# Patient Record
Sex: MALE | Race: BLACK OR AFRICAN AMERICAN | Employment: OTHER | URBAN - METROPOLITAN AREA
[De-identification: names, ages, dates, MRNs, and addresses within clinical notes are randomized per-mention and may not be internally consistent; named-entity substitution may affect disease eponyms.]

---

## 2017-02-28 ENCOUNTER — OFFICE VISIT (OUTPATIENT)
Dept: INTERNAL MEDICINE CLINIC | Age: 67
End: 2017-02-28

## 2017-02-28 VITALS
SYSTOLIC BLOOD PRESSURE: 131 MMHG | HEART RATE: 58 BPM | DIASTOLIC BLOOD PRESSURE: 83 MMHG | RESPIRATION RATE: 18 BRPM | OXYGEN SATURATION: 97 % | HEIGHT: 68 IN | BODY MASS INDEX: 27.74 KG/M2 | WEIGHT: 183 LBS | TEMPERATURE: 98.4 F

## 2017-02-28 DIAGNOSIS — R35.1 NOCTURIA: ICD-10-CM

## 2017-02-28 DIAGNOSIS — I35.1 AORTIC VALVE INSUFFICIENCY, UNSPECIFIED ETIOLOGY: ICD-10-CM

## 2017-02-28 DIAGNOSIS — R79.9 ABNORMAL FINDING OF BLOOD CHEMISTRY: ICD-10-CM

## 2017-02-28 DIAGNOSIS — I10 ESSENTIAL HYPERTENSION: Primary | ICD-10-CM

## 2017-02-28 DIAGNOSIS — M19.90 ARTHRITIS: ICD-10-CM

## 2017-02-28 DIAGNOSIS — I51.89 DIASTOLIC DYSFUNCTION: ICD-10-CM

## 2017-02-28 RX ORDER — ESCITALOPRAM OXALATE 10 MG/1
10 TABLET ORAL DAILY
Qty: 30 TAB | Refills: 11 | Status: SHIPPED | OUTPATIENT
Start: 2017-02-28 | End: 2017-09-26 | Stop reason: SDUPTHER

## 2017-02-28 RX ORDER — LANSOPRAZOLE 30 MG/1
30 CAPSULE, DELAYED RELEASE ORAL
Qty: 30 CAP | Refills: 11 | Status: SHIPPED | OUTPATIENT
Start: 2017-02-28 | End: 2021-11-08 | Stop reason: ALTCHOICE

## 2017-02-28 RX ORDER — CYCLOBENZAPRINE HCL 10 MG
10 TABLET ORAL
Qty: 60 TAB | Refills: 4 | Status: SHIPPED | OUTPATIENT
Start: 2017-02-28 | End: 2017-09-26 | Stop reason: SDUPTHER

## 2017-02-28 NOTE — MR AVS SNAPSHOT
Visit Information Date & Time Provider Department Dept. Phone Encounter #  
 2/28/2017  2:45 PM Mildred Toledo MD [facility] - [facility] [phone] [id number] Follow-up Instructions Return in about 6 months (around 8/28/2017). Follow-up and Disposition History Your Appointments 8/29/2017  2:30 PM  
Any with Mildred Toledo MD  
[facility] ([facility]) Appt Note: f/u  
 [address], [place] [address] [address]  
  
   
 [address], [facility] [address] Upcoming Health Maintenance Date Due FOBT Q 1 YEAR AGE 50-75 7/23/2016 MEDICARE YEARLY EXAM 8/26/2017 Pneumococcal 65+ Low/Medium Risk (2 of 2 - PPSV23) 2/28/2018 GLAUCOMA SCREENING Q2Y 2/28/2019 DTaP/Tdap/Td series (2 - Td) 1/27/2026 Allergies as of 2/28/2017  Review Complete On: 2/28/2017 By: Mildred Toledo MD  
  
 Severity Noted Reaction Type Reactions Sulfa (Sulfonamide Antibiotics)  01/22/2015    Hives Current Immunizations  Never Reviewed No immunizations on file. Not reviewed this visit You Were Diagnosed With   
  
 Codes Comments Essential hypertension    -  Primary ICD-10-CM: I10 
ICD-9-CM: 401.9 Arthritis     ICD-10-CM: M19.90 ICD-9-CM: 716.90 Diastolic dysfunction     XLA-54-GV: I51.9 ICD-9-CM: 429.9 Aortic valve insufficiency, unspecified etiology     ICD-10-CM: I35.1 ICD-9-CM: 424.1 Nocturia     ICD-10-CM: R35.1 ICD-9-CM: 788.43 Abnormal finding of blood chemistry     ICD-10-CM: R79.9 ICD-9-CM: 790.6 Vitals BP  
  
  
  
  
  
 131/83 (BP 1 Location: Right arm, BP Patient Position: Sitting) BMI and BSA Data Body Mass Index Body Surface Area  
 27.83 kg/m 2 2 m 2 Preferred Pharmacy Pharmacy Name Phone  [facility]/PHARMACY #[id number], [place] - [address] [facility] [phone] Your Updated Medication List  
  
   
This list is accurate as of: 2/28/17  4:07 PM.  Always use your most recent med list.  
  
  
  
  
 COMBIGAN 0.2-0.5 % Drop ophthalmic solution Generic drug:  brimonidine-timolol 1 drop every twelve (12) hours. cyclobenzaprine 10 mg tablet Commonly known as:  FLEXERIL Take 1 Tab by mouth three (3) times daily as needed for Muscle Spasm(s). escitalopram oxalate 10 mg tablet Commonly known as:  Joao Hands Take 1 Tab by mouth daily. lansoprazole 30 mg capsule Commonly known as:  PREVACID Take 1 Cap by mouth Daily (before breakfast). latanoprost 0.005 % ophthalmic solution Commonly known as:  Dayton Ridgel Administer 1 drop to both eyes nightly. Olmesartan-amLODIPine-HCTZ 40-5-25 mg Tab Commonly known as:  [employer] Take 1 Each by mouth daily. Prescriptions Sent to Pharmacy Refills  
 cyclobenzaprine (FLEXERIL) 10 mg tablet 4 Sig: Take 1 Tab by mouth three (3) times daily as needed for Muscle Spasm(s). Class: Normal  
 Pharmacy: [facility]/pharmacy [address] [place] [address] Ph #: [phone] Route: Oral  
 escitalopram oxalate (LEXAPRO) 10 mg tablet 11 Sig: Take 1 Tab by mouth daily. Class: Normal  
 Pharmacy: [facility]/pharmacy #[id number] Ph #: [phone] Route: Oral  
 lansoprazole (PREVACID) 30 mg capsule 11 Sig: Take 1 Cap by mouth Daily (before breakfast). Class: Normal  
 Pharmacy: [facility]/pharmacy #[id number] Ph #: [phone] Route: Oral  
  
We Performed the Following APOLIPOPROTEIN B [id number] CPT(R)] CBC WITH AUTOMATED DIFF [85560 CPT(R)] HEMOGLOBIN A1C WITH EAG [84629 CPT(R)] LIPID PANEL [90802 CPT(R)] METABOLIC PANEL, COMPREHENSIVE [24009 CPT(R)] CO COLLECTION VENOUS BLOOD,VENIPUNCTURE [id number] CPT(R)] PROSTATE SPECIFIC AG (PSA) [id number] CPT(R)] URINALYSIS W/ RFLX MICROSCOPIC [78622 CPT(R)] Follow-up Instructions Return in about 6 months (around 8/28/2017). Introducing [facility]! Denise Franco introduces [employer] patient portal. Now you can access parts of your medical record, email your doctor's office, and request medication refills online. 1. In your internet browser, go to [email or web address]. [email or web address]/"[employer] 2. Click on the First Time User? Click Here link in the Sign In box. You will see the New Member Sign Up page. 3. Enter your [employer] Access Code exactly as it appears below. You will not need to use this code after youve completed the sign-up process. If you do not sign up before the expiration date, you must request a new code. · [employer] Access Code: [email or web address] Expires: 5/29/2017  4:07 PM 
 
4. Enter the last four digits of your Social Security Number (xxxx) and Date of Birth (mm/dd/yyyy) as indicated and click Submit. You will be taken to the next sign-up page. 5. Create a [employer] ID. This will be your [employer] login ID and cannot be changed, so think of one that is secure and easy to remember. 6. Create a [employer] password. You can change your password at any time. 7. Enter your Password Reset Question and Answer. This can be used at a later time if you forget your password. 8. Enter your e-mail address. You will receive e-mail notification when new information is available in [address] 9. Click Sign Up. You can now view and download portions of your medical record. 10. Click the Download Summary menu link to download a portable copy of your medical information. If you have questions, please visit the Frequently Asked Questions section of the [employer] website. Remember, [employer] is NOT to be used for urgent needs. For medical emergencies, dial 911. Now available from your iPhone and Android! Please provide this summary of care documentation to your next provider. Your primary care clinician is listed as [facility]. If you have any questions after today's visit, please call [phone].

## 2017-02-28 NOTE — PROGRESS NOTES
.1. Have you been to the ER, urgent care clinic since your last visit? Hospitalized since your last visit? No    2. Have you seen or consulted any other health care providers outside of the [address] [place] [address] since your last visit? Include any pap smears or colon screening.  No

## 2017-02-28 NOTE — PROGRESS NOTES
[facility]  Osman Babcock MD, [address] [address]  Phone:  [phone]  Fax: [phone]      Chief Complaint   Patient presents with    Hypertension         SUBECTIVE:    Charlotte Mccoy is a 73 y.o. male Patient voices no new complaints. He has an appointment to see Dr. Cheryle Sorenson for follow-up of his aortic stenosis on Thursday. He continues to exercise regularly. He is now walking for about two miles, thirty minutes every day. Patient is seen for evaluation. Current Outpatient Prescriptions   Medication Sig Dispense Refill    cyclobenzaprine (FLEXERIL) 10 mg tablet Take 1 Tab by mouth three (3) times daily as needed for Muscle Spasm(s). 60 Tab 4    escitalopram oxalate (LEXAPRO) 10 mg tablet Take 1 Tab by mouth daily. 30 Tab 11    lansoprazole (PREVACID) 30 mg capsule Take 1 Cap by mouth Daily (before breakfast). 30 Cap 11    Olmesartan-amLODIPine-HCTZ (TRIBENZOR) 40-5-25 mg tab Take 1 Each by mouth daily. 90 Tab 3    brimonidine-timolol (COMBIGAN) 0.2-0.5 % drop ophthalmic solution 1 drop every twelve (12) hours.  latanoprost (XALATAN) 0.005 % ophthalmic solution Administer 1 drop to both eyes nightly. Past Medical History:   Diagnosis Date    Aortic insufficiency 08/30/2015    dangelo dangelo md    Arthritis     DDD (degenerative disc disease)     Diastolic dysfunction     Glaucoma     Hypertension     LBP (low back pain)     Mild aortic insufficiency 03/18/2016    S/P colonoscopy 4-13-15    hemorrhoids     Past Surgical History:   Procedure Laterality Date    ABDOMEN SURGERY PROC UNLISTED      bilateral hernia repair    HX COLONOSCOPY       Allergies   Allergen Reactions    Sulfa (Sulfonamide Antibiotics) Hives       REVIEW OF SYSTEMS:   There is no chest pain and no shortness of breath.          Social History     Social History    Marital status:      Spouse name: N/A    Number of children: N/A    Years of education: N/A     Social History Main Topics    Smoking status: Former Smoker    Smokeless tobacco: Never Used    Alcohol use No    Drug use: None    Sexual activity: Not Asked     Other Topics Concern    None     Social History Narrative    Medical History: Primary hypertensionDepressive disordernormal head CT May 15, 2007Mild aortic regurgitationDiastolic dysfunctionnormal left    ventricular systolic function ejection fraction 50% echocardiogram 2011negative stress w all motion study 2001 ejection    fraction 48%GlaucomaDecember 2013 CT abdomen and pelvis degenerative changes lumbar spine no acute process    Surgical History: hernia repair 2012colonoscopy 2005[facility] 2014    Hospitalization/Major Diagnostic Procedure: Denies Past Hospitalization    Family History: Mother: alive, hypertension,Father:  79 yrs, chfSister(s): aliveBrother(s): aliveDaughter(s): aliveSon(s):    aliveChildren: aliveAdopted: deceased2 brother(s) , 2 sister(s) . 1 son(s) , 1 daughter(s) . Social History: Alcohol Use Patient does not use alcohol. Smoking Status Patient is a never smoker. Exercise: running. Marital Status:    . Lives w ith: spouse - recently had bunion surgery - eric. 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 Education/School: high school.   r  Family History   Problem Relation Age of Onset    Heart Disease Father        OBJECTIVE:  Visit Vitals    /83 (BP 1 Location: Right arm, BP Patient Position: Sitting)    Pulse (!) 58    Temp 98.4 °F (36.9 °C) (Oral)    Resp 18    Ht 5' 8\" (1.727 m)    Wt 183 lb (83 kg)    SpO2 97%    BMI 27.83 kg/m2     ENT: perrla,  eom intact  NECK: supple.  Thyroid normal  CHEST: clear to ascultation and percussion   HEART: regular rate and rhythm  ABD: soft, bowel sounds active  EXTREMITIES: no edema, pulse 1+     No visits with results within 3 Month(s) from this visit. Latest known visit with results is:    Office Visit on 04/14/2016   Component Date Value Ref Range Status    Specific Gravity 04/14/2016 1.014  1.005 - 1.030 Final    pH (UA) 04/14/2016 6.0  5.0 - 7.5 Final    Color 04/14/2016 Yellow  Yellow Final    Appearance 04/14/2016 Clear  Clear Final    Leukocyte Esterase 04/14/2016 Negative  Negative Final    Protein 04/14/2016 Negative  Negative/Trace Final    Glucose 04/14/2016 Negative  Negative Final    Ketone 04/14/2016 Negative  Negative Final    Blood 04/14/2016 Negative  Negative Final    Bilirubin 04/14/2016 Negative  Negative Final    Urobilinogen 04/14/2016 0.2  0.2 - 1.0 mg/dL Final    Nitrites 04/14/2016 Negative  Negative Final    Microscopic Examination 04/14/2016 Comment   Final    Microscopic not indicated and not performed.  WBC 04/14/2016 6.2  3.4 - 10.8 x10E3/uL Final    RBC 04/14/2016 4.84  4.14 - 5.80 x10E6/uL Final    HGB 04/14/2016 14.5  12.6 - 17.7 g/dL Final    HCT 04/14/2016 40.9  37.5 - 51.0 % Final    MCV 04/14/2016 85  79 - 97 fL Final    MCH 04/14/2016 30.0  26.6 - 33.0 pg Final    MCHC 04/14/2016 35.5  31.5 - 35.7 g/dL Final    RDW 04/14/2016 14.8  12.3 - 15.4 % Final    PLATELET 03/48/8295 952  150 - 379 x10E3/uL Final    NEUTROPHILS 04/14/2016 56  % Final    Lymphocytes 04/14/2016 32  % Final    MONOCYTES 04/14/2016 8  % Final    EOSINOPHILS 04/14/2016 3  % Final    BASOPHILS 04/14/2016 1  % Final    ABS. NEUTROPHILS 04/14/2016 3.5  1.4 - 7.0 x10E3/uL Final    Abs Lymphocytes 04/14/2016 2.0  0.7 - 3.1 x10E3/uL Final    ABS. MONOCYTES 04/14/2016 0.5  0.1 - 0.9 x10E3/uL Final    ABS. EOSINOPHILS 04/14/2016 0.2  0.0 - 0.4 x10E3/uL Final    ABS. BASOPHILS 04/14/2016 0.0  0.0 - 0.2 x10E3/uL Final    IMMATURE GRANULOCYTES 04/14/2016 0  % Final    ABS. IMM.  GRANS. 04/14/2016 0.0  0.0 - 0.1 x10E3/uL Final    Glucose 04/14/2016 96  65 - 99 mg/dL Final    BUN 04/14/2016 20  8 - 27 mg/dL Final    Creatinine 04/14/2016 1.24  0.76 - 1.27 mg/dL Final    GFR est non-AA 04/14/2016 61  >59 mL/min/1.73 Final    GFR est AA 04/14/2016 71  >59 mL/min/1.73 Final    BUN/Creatinine ratio 04/14/2016 16  10 - 22 Final    Sodium 04/14/2016 142  134 - 144 mmol/L Final    Potassium 04/14/2016 3.9  3.5 - 5.2 mmol/L Final    Chloride 04/14/2016 99  97 - 108 mmol/L Final    CO2 04/14/2016 26  18 - 29 mmol/L Final    Calcium 04/14/2016 9.7  8.6 - 10.2 mg/dL Final    Protein, total 04/14/2016 7.0  6.0 - 8.5 g/dL Final    Albumin 04/14/2016 4.2  3.6 - 4.8 g/dL Final    GLOBULIN, TOTAL 04/14/2016 2.8  1.5 - 4.5 g/dL Final    A-G Ratio 04/14/2016 1.5  1.1 - 2.5 Final    Bilirubin, total 04/14/2016 0.4  0.0 - 1.2 mg/dL Final    Alk. phosphatase 04/14/2016 65  39 - 117 IU/L Final    AST (SGOT) 04/14/2016 19  0 - 40 IU/L Final    ALT (SGPT) 04/14/2016 10  0 - 44 IU/L Final    Prostate Specific Ag 04/14/2016 0.9  0.0 - 4.0 ng/mL Final    Comment: Roche ECLIA methodology. According to the American Urological Association, Serum PSA should  decrease and remain at undetectable levels after radical  prostatectomy. The AUA defines biochemical recurrence as an initial  PSA value 0.2 ng/mL or greater followed by a subsequent confirmatory  PSA value 0.2 ng/mL or greater. Values obtained with different assay methods or kits cannot be used  interchangeably. Results cannot be interpreted as absolute evidence  of the presence or absence of malignant disease.  Cholesterol, total 04/14/2016 235* 100 - 199 mg/dL Final    Triglyceride 04/14/2016 123  0 - 149 mg/dL Final    HDL Cholesterol 04/14/2016 52  >39 mg/dL Final    Comment: According to ATP-III Guidelines, HDL-C >59 mg/dL is considered a  negative risk factor for CHD.       VLDL, calculated 04/14/2016 25  5 - 40 mg/dL Final    LDL, calculated 04/14/2016 158* 0 - 99 mg/dL Final    Hemoglobin A1c 04/14/2016 5.6  4.8 - 5.6 % Final    Comment: Pre-diabetes: 5.7 - 6.4           Diabetes: >6.4           Glycemic control for adults with diabetes: <7.0      Estimated average glucose 04/14/2016 114  mg/dL Final          ASSESSMENT:  1. Essential hypertension    2. Arthritis    3. Diastolic dysfunction    4. Aortic valve insufficiency, unspecified etiology    5. Nocturia     6. Abnormal finding of blood chemistry       Patient has stiffness of his back when he gets up in the morning. He would like some more Flexeril which we will give him a prescription for. He is also complaining of gastroesophageal reflux symptoms for which we will place him on a PPI. He also would like some more Effexor. He thinks it is more seasonal related and I do not disagree. Appropriate laboratory studies will be requested. He will see us again in about six months. PLAN:  .  Orders Placed This Encounter    URINALYSIS W/ RFLX MICROSCOPIC    CBC WITH AUTOMATED DIFF    METABOLIC PANEL, COMPREHENSIVE    LIPID PANEL    PROSTATE SPECIFIC AG    HEMOGLOBIN A1C WITH EAG    APOLIPOPROTEIN B    cyclobenzaprine (FLEXERIL) 10 mg tablet    escitalopram oxalate (LEXAPRO) 10 mg tablet    lansoprazole (PREVACID) 30 mg capsule       Follow-up Disposition:  Return in about 6 months (around 8/28/2017). ATTENTION:   This medical record was transcribed using an electronic medical records system. Although proofread, it may and can contain electronic and spelling errors. Other human spelling and other errors may be present. Corrections may be executed at a later time. Please feel free to contact us for any clarifications as needed.

## 2017-05-10 RX ORDER — OLMESARTAN MEDOXOMIL, AMLODIPINE AND HYDROCHLOROTHIAZIDE TABLET 40/5/25 MG 40; 5; 25 MG/1; MG/1; MG/1
1 TABLET ORAL DAILY
Qty: 90 TAB | Refills: 3 | Status: SHIPPED | OUTPATIENT
Start: 2017-05-10 | End: 2018-04-25 | Stop reason: SDUPTHER

## 2017-09-26 ENCOUNTER — OFFICE VISIT (OUTPATIENT)
Dept: INTERNAL MEDICINE CLINIC | Age: 67
End: 2017-09-26

## 2017-09-26 VITALS
BODY MASS INDEX: 27.13 KG/M2 | SYSTOLIC BLOOD PRESSURE: 142 MMHG | RESPIRATION RATE: 22 BRPM | TEMPERATURE: 95.3 F | HEIGHT: 68 IN | DIASTOLIC BLOOD PRESSURE: 92 MMHG | OXYGEN SATURATION: 100 % | HEART RATE: 51 BPM | WEIGHT: 179 LBS

## 2017-09-26 DIAGNOSIS — Z13.31 SCREENING FOR DEPRESSION: ICD-10-CM

## 2017-09-26 DIAGNOSIS — Z13.39 SCREENING FOR ALCOHOLISM: ICD-10-CM

## 2017-09-26 DIAGNOSIS — R31.9 HEMATURIA: ICD-10-CM

## 2017-09-26 DIAGNOSIS — M19.90 ARTHRITIS: ICD-10-CM

## 2017-09-26 DIAGNOSIS — I51.89 DIASTOLIC DYSFUNCTION: ICD-10-CM

## 2017-09-26 DIAGNOSIS — R79.9 ABNORMAL FINDING OF BLOOD CHEMISTRY: ICD-10-CM

## 2017-09-26 DIAGNOSIS — I35.1 AORTIC VALVE REGURGITATION, UNSPECIFIED ETIOLOGY: ICD-10-CM

## 2017-09-26 DIAGNOSIS — I10 ESSENTIAL HYPERTENSION: ICD-10-CM

## 2017-09-26 DIAGNOSIS — Z00.00 MEDICARE ANNUAL WELLNESS VISIT, SUBSEQUENT: Primary | ICD-10-CM

## 2017-09-26 RX ORDER — CYCLOBENZAPRINE HCL 10 MG
10 TABLET ORAL
Qty: 180 TAB | Refills: 4 | Status: SHIPPED | OUTPATIENT
Start: 2017-09-26 | End: 2019-06-18

## 2017-09-26 RX ORDER — ESCITALOPRAM OXALATE 10 MG/1
10 TABLET ORAL DAILY
Qty: 90 TAB | Refills: 11 | Status: SHIPPED | OUTPATIENT
Start: 2017-09-26 | End: 2018-12-12 | Stop reason: SDUPTHER

## 2017-09-26 NOTE — PROGRESS NOTES
This is a Subsequent Medicare Annual Wellness Exam (AWV) (Performed 12 months after IPPE or effective date of Medicare Part B enrollment, Once in a lifetime)    I have reviewed the patient's medical history in detail and updated the computerized patient record. History     Past Medical History:   Diagnosis Date    Aortic insufficiency 08/30/2015    dangelo - leno dangelo md    Arthritis     DDD (degenerative disc disease)     Diastolic dysfunction     Glaucoma     Hypertension     LBP (low back pain)     Mild aortic insufficiency 03/18/2016    S/P colonoscopy 4-13-15    hemorrhoids      Past Surgical History:   Procedure Laterality Date    ABDOMEN SURGERY PROC UNLISTED      bilateral hernia repair    HX COLONOSCOPY       Current Outpatient Prescriptions   Medication Sig Dispense Refill    Olmesartan-amLODIPine-HCTZ (TRIBENZOR) 40-5-25 mg tab Take 1 Each by mouth daily. 90 Tab 3    escitalopram oxalate (LEXAPRO) 10 mg tablet Take 1 Tab by mouth daily. 30 Tab 11    lansoprazole (PREVACID) 30 mg capsule Take 1 Cap by mouth Daily (before breakfast). 30 Cap 11    brimonidine-timolol (COMBIGAN) 0.2-0.5 % drop ophthalmic solution 1 drop every twelve (12) hours.  latanoprost (XALATAN) 0.005 % ophthalmic solution Administer 1 drop to both eyes nightly.  cyclobenzaprine (FLEXERIL) 10 mg tablet Take 1 Tab by mouth three (3) times daily as needed for Muscle Spasm(s).  60 Tab 4     Allergies   Allergen Reactions    Sulfa (Sulfonamide Antibiotics) Hives     Family History   Problem Relation Age of Onset    Heart Disease Father      Social History   Substance Use Topics    Smoking status: Former Smoker    Smokeless tobacco: Never Used    Alcohol use No     Patient Active Problem List   Diagnosis Code    Hypertension I10    Arthritis M19.90    LBP (low back pain) O79.7    Diastolic dysfunction S63.0    Aortic insufficiency I35.1    Mild aortic insufficiency I35.1       Depression Risk Factor Screening:     PHQ over the last two weeks 9/26/2017   PHQ Not Done Active Diagnosis of Depression or Bipolar Disorder   Little interest or pleasure in doing things -   Feeling down, depressed or hopeless -   Total Score PHQ 2 -     Alcohol Risk Factor Screening: You do not drink alcohol or very rarely. Functional Ability and Level of Safety:   Hearing Loss  Hearing is good. Activities of Daily Living  The home contains: no safety equipment  Patient does total self care    Fall Risk  Fall Risk Assessment, last 12 mths 9/26/2017   Able to walk? Yes   Fall in past 12 months? No       Abuse Screen  Patient is not abused    Cognitive Screening   Evaluation of Cognitive Function:  Has your family/caregiver stated any concerns about your memory: no  Normal    Patient Care Team   Patient Care Team:  Marixa Landry MD as PCP - General (Internal Medicine)    Assessment/Plan   Education and counseling provided:  Are appropriate based on today's review and evaluation    Diagnoses and all orders for this visit:    1. Essential hypertension  -     URINALYSIS W/ RFLX MICROSCOPIC  -     CBC WITH AUTOMATED DIFF  -     METABOLIC PANEL, COMPREHENSIVE  -     LIPID PANEL  -     PROSTATE SPECIFIC AG  -     ME COLLECTION VENOUS BLOOD,VENIPUNCTURE  -     HEMOGLOBIN A1C WITH EAG    2. Arthritis    3. Diastolic dysfunction    4. Aortic valve regurgitation, unspecified etiology    5. Hematuria   -     PROSTATE SPECIFIC AG    6. Abnormal finding of blood chemistry   -     HEMOGLOBIN A1C WITH EAG        There are no preventive care reminders to display for this patient. Advance Care Planning (ACP) Provider Conversation Snapshot    Date of ACP Conversation: 09/26/17  Persons included in Conversation:  patient  Length of ACP Conversation in minutes:  <16 minutes (Non-Billable)    Authorized Decision Maker (if patient is incapable of making informed decisions):    This person is:   Healthcare Agent/Medical Power of  under Advance Directive  wife        For Patients with Decision Making Capacity:   Values/Goals: Exploration of values, goals, and preferences if recovery is not expected, even with continued medical treatment in the event of:  Imminent death    Conversation Outcomes / Follow-Up Plan:   Recommended completion of Advance Directive form after review of ACP materials and conversation with prospective healthcare agent      [address], [place], [facility] [address] [place] [address],[address] [address]  Phone:  [phone]  Fax: [phone]      Chief Complaint   Patient presents with    Annual Wellness Visit         SUBECTIVE:    Pradeep Sanchez is a 78 y.o. male The patient returns today, alert, appropriate and has the capacity to give an accurate history. He has a known history of aortic insufficiency followed by Jeronimo Cm MD, degenerative disc disease, diastolic dysfunction, primary hypertension, low back pain and glaucoma. He voices no new complaints and is seen for evaluation. Current Outpatient Prescriptions   Medication Sig Dispense Refill    Olmesartan-amLODIPine-HCTZ (TRIBENZOR) 40-5-25 mg tab Take 1 Each by mouth daily. 90 Tab 3    escitalopram oxalate (LEXAPRO) 10 mg tablet Take 1 Tab by mouth daily. 30 Tab 11    lansoprazole (PREVACID) 30 mg capsule Take 1 Cap by mouth Daily (before breakfast). 30 Cap 11    brimonidine-timolol (COMBIGAN) 0.2-0.5 % drop ophthalmic solution 1 drop every twelve (12) hours.  latanoprost (XALATAN) 0.005 % ophthalmic solution Administer 1 drop to both eyes nightly.  cyclobenzaprine (FLEXERIL) 10 mg tablet Take 1 Tab by mouth three (3) times daily as needed for Muscle Spasm(s).  61 Tab 4     Past Medical History:   Diagnosis Date    Aortic insufficiency 08/30/2015    dangelo - leno dangelo md    Arthritis     DDD (degenerative disc disease)     Diastolic dysfunction     Glaucoma     Hypertension     LBP (low back pain)     Mild aortic insufficiency 2016    S/P colonoscopy 4-13-15    hemorrhoids     Past Surgical History:   Procedure Laterality Date    ABDOMEN SURGERY PROC UNLISTED      bilateral hernia repair    HX COLONOSCOPY       Allergies   Allergen Reactions    Sulfa (Sulfonamide Antibiotics) Hives       REVIEW OF SYSTEMS:   No chest pain, no shortness of breath. Social History     Social History    Marital status:      Spouse name: N/A    Number of children: N/A    Years of education: N/A     Social History Main Topics    Smoking status: Former Smoker    Smokeless tobacco: Never Used    Alcohol use No    Drug use: None    Sexual activity: Not Asked     Other Topics Concern    None     Social History Narrative    Medical History: Primary hypertensionDepressive disordernormal head CT May 15, 2007Mild aortic regurgitationDiastolic dysfunctionnormal left    ventricular systolic function ejection fraction 50% echocardiogram 2011negative stress w all motion study 2001 ejection    fraction 48%GlaucomaDecemb 2013 CT abdomen and pelvis degenerative changes lumbar spine no acute process    Surgical History: hernia repair 2012colonoscopy 2005[facility]    Hospitalization/Major Diagnostic Procedure: Denies Past Hospitalization    Family History: Mother: alive, hypertension,Father:  79 yrs, chfSister(s): aliveBrother(s): aliveDaughter(s): aliveSon(s):    aliveChildren: aliveAdopted: deceased2 brother(s) , 2 sister(s) . 1 son(s) , 1 daughter(s) . Social History: Alcohol Use Patient does not use alcohol. Smoking Status Patient is a never smoker. Exercise: running. Marital Status:    . Lives w ith: spouse - recently had bunion surgery - eric. 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  Education/School: high school.   r  Family History   Problem Relation Age of Onset    Heart Disease Father        OBJECTIVE:  Visit Vitals    BP (!) 142/92 (BP 1 Location: Right arm, BP Patient Position: Sitting)    Pulse (!) 51    Temp 95.3 °F (35.2 °C) (Oral)    Resp 22    Ht 5' 8\" (1.727 m)    Wt 179 lb (81.2 kg)    SpO2 100%    BMI 27.22 kg/m2     ENT: perrla,  eom intact  NECK: supple. Thyroid normal  CHEST: clear to ascultation and percussion   HEART: regular rate and rhythm  ABD: soft, bowel sounds active  EXTREMITIES: no edema, pulse 1+     No visits with results within 3 Month(s) from this visit. Latest known visit with results is:    Office Visit on 04/14/2016   Component Date Value Ref Range Status    Specific Gravity 04/14/2016 1.014  1.005 - 1.030 Final    pH (UA) 04/14/2016 6.0  5.0 - 7.5 Final    Color 04/14/2016 Yellow  Yellow Final    Appearance 04/14/2016 Clear  Clear Final    Leukocyte Esterase 04/14/2016 Negative  Negative Final    Protein 04/14/2016 Negative  Negative/Trace Final    Glucose 04/14/2016 Negative  Negative Final    Ketone 04/14/2016 Negative  Negative Final    Blood 04/14/2016 Negative  Negative Final    Bilirubin 04/14/2016 Negative  Negative Final    Urobilinogen 04/14/2016 0.2  0.2 - 1.0 mg/dL Final    Nitrites 04/14/2016 Negative  Negative Final    Microscopic Examination 04/14/2016 Comment   Final    Microscopic not indicated and not performed.     WBC 04/14/2016 6.2  3.4 - 10.8 x10E3/uL Final    RBC 04/14/2016 4.84  4.14 - 5.80 x10E6/uL Final    HGB 04/14/2016 14.5  12.6 - 17.7 g/dL Final    HCT 04/14/2016 40.9  37.5 - 51.0 % Final    MCV 04/14/2016 85  79 - 97 fL Final    MCH 04/14/2016 30.0  26.6 - 33.0 pg Final    MCHC 04/14/2016 35.5  31.5 - 35.7 g/dL Final    RDW 04/14/2016 14.8  12.3 - 15.4 % Final    PLATELET 80/41/9458 317  150 - 379 x10E3/uL Final    NEUTROPHILS 04/14/2016 56  % Final    Lymphocytes 04/14/2016 32  % Final    MONOCYTES 04/14/2016 8  % Final    EOSINOPHILS 04/14/2016 3  % Final    BASOPHILS 04/14/2016 1  % Final    ABS. NEUTROPHILS 04/14/2016 3.5  1.4 - 7.0 x10E3/uL Final    Abs Lymphocytes 04/14/2016 2.0  0.7 - 3.1 x10E3/uL Final    ABS. MONOCYTES 04/14/2016 0.5  0.1 - 0.9 x10E3/uL Final    ABS. EOSINOPHILS 04/14/2016 0.2  0.0 - 0.4 x10E3/uL Final    ABS. BASOPHILS 04/14/2016 0.0  0.0 - 0.2 x10E3/uL Final    IMMATURE GRANULOCYTES 04/14/2016 0  % Final    ABS. IMM. GRANS. 04/14/2016 0.0  0.0 - 0.1 x10E3/uL Final    Glucose 04/14/2016 96  65 - 99 mg/dL Final    BUN 04/14/2016 20  8 - 27 mg/dL Final    Creatinine 04/14/2016 1.24  0.76 - 1.27 mg/dL Final    GFR est non-AA 04/14/2016 61  >59 mL/min/1.73 Final    GFR est AA 04/14/2016 71  >59 mL/min/1.73 Final    BUN/Creatinine ratio 04/14/2016 16  10 - 22 Final    Sodium 04/14/2016 142  134 - 144 mmol/L Final    Potassium 04/14/2016 3.9  3.5 - 5.2 mmol/L Final    Chloride 04/14/2016 99  97 - 108 mmol/L Final    CO2 04/14/2016 26  18 - 29 mmol/L Final    Calcium 04/14/2016 9.7  8.6 - 10.2 mg/dL Final    Protein, total 04/14/2016 7.0  6.0 - 8.5 g/dL Final    Albumin 04/14/2016 4.2  3.6 - 4.8 g/dL Final    GLOBULIN, TOTAL 04/14/2016 2.8  1.5 - 4.5 g/dL Final    A-G Ratio 04/14/2016 1.5  1.1 - 2.5 Final    Bilirubin, total 04/14/2016 0.4  0.0 - 1.2 mg/dL Final    Alk. phosphatase 04/14/2016 65  39 - 117 IU/L Final    AST (SGOT) 04/14/2016 19  0 - 40 IU/L Final    ALT (SGPT) 04/14/2016 10  0 - 44 IU/L Final    Prostate Specific Ag 04/14/2016 0.9  0.0 - 4.0 ng/mL Final    Comment: Roche ECLIA methodology. According to the American Urological Association, Serum PSA should  decrease and remain at undetectable levels after radical  prostatectomy. The AUA defines biochemical recurrence as an initial  PSA value 0.2 ng/mL or greater followed by a subsequent confirmatory  PSA value 0.2 ng/mL or greater. Values obtained with different assay methods or kits cannot be used  interchangeably.  Results cannot be interpreted as absolute evidence  of the presence or absence of malignant disease.  Cholesterol, total 04/14/2016 235* 100 - 199 mg/dL Final    Triglyceride 04/14/2016 123  0 - 149 mg/dL Final    HDL Cholesterol 04/14/2016 52  >39 mg/dL Final    Comment: According to ATP-III Guidelines, HDL-C >59 mg/dL is considered a  negative risk factor for CHD.  VLDL, calculated 04/14/2016 25  5 - 40 mg/dL Final    LDL, calculated 04/14/2016 158* 0 - 99 mg/dL Final    Hemoglobin A1c 04/14/2016 5.6  4.8 - 5.6 % Final    Comment:          Pre-diabetes: 5.7 - 6.4           Diabetes: >6.4           Glycemic control for adults with diabetes: <7.0      Estimated average glucose 04/14/2016 114  mg/dL Final          ASSESSMENT:  1. Essential hypertension    2. Arthritis    3. Diastolic dysfunction    4. Aortic valve regurgitation, unspecified etiology    5. Hematuria     6. Abnormal finding of blood chemistry       The patient's medical progress is satisfactory. He complains of chronic low back pain which has stopped him from running. He went on the Internet and saw that walking is good for the back. He has tried different exercises for the back with little relief. He has tried different NSAID's at home. His blood pressure is approaching ideal.  The patient has white coat hypertension which is manifested by blood pressure in the 90/50's at home. Cardiac status seems to be stable. Appropriate laboratory studies have been requested. He will return to see me in about six months. PLAN:  .  Orders Placed This Encounter    URINALYSIS W/ RFLX MICROSCOPIC    CBC WITH AUTOMATED DIFF    METABOLIC PANEL, COMPREHENSIVE    LIPID PANEL    PROSTATE SPECIFIC AG    HEMOGLOBIN A1C WITH EAG       Follow-up Disposition: Not on File      ATTENTION:   This medical record was transcribed using an electronic medical records system. Although proofread, it may and can contain electronic and spelling errors.   Other human spelling and other errors may be present. Corrections may be executed at a later time. Please feel free to contact us for any clarifications as needed.

## 2017-09-26 NOTE — PROGRESS NOTES
.1. Have you been to the ER, urgent care clinic since your last visit? Hospitalized since your last visit? No    2. Have you seen or consulted any other health care providers outside of the [facility] [place] since your last visit? Include any pap smears or colon screening.  No

## 2017-09-26 NOTE — MR AVS SNAPSHOT
Visit Information Date & Time Provider Department Dept. Phone Encounter #  
 9/26/2017  3:30 PM Brad Gonzalez MD [facility] [phone] [id number] Follow-up Instructions Return in about 6 months (around 3/26/2018). Follow-up and Disposition History Upcoming Health Maintenance Date Due FOBT Q 1 YEAR AGE 50-75 9/26/2018* Pneumococcal 65+ Low/Medium Risk (2 of 2 - PPSV23) 2/28/2018 MEDICARE YEARLY EXAM 9/27/2018 GLAUCOMA SCREENING Q2Y 2/28/2019 DTaP/Tdap/Td series (2 - Td) 1/27/2026 *Topic was postponed. The date shown is not the original due date. Allergies as of 9/26/2017  Review Complete On: 9/26/2017 By: Brad Gonzalez MD  
  
 Severity Noted Reaction Type Reactions Sulfa (Sulfonamide Antibiotics)  01/22/2015    Hives Current Immunizations  Never Reviewed No immunizations on file. Not reviewed this visit You Were Diagnosed With   
  
 Codes Comments Medicare annual wellness visit, subsequent    -  Primary ICD-10-CM: Z00.00 ICD-9-CM: V70.0 Essential hypertension     ICD-10-CM: I10 
ICD-9-CM: 401.9 Arthritis     ICD-10-CM: M19.90 ICD-9-CM: 716.90 Diastolic dysfunction     YFY-63-BU: I51.9 ICD-9-CM: 429.9 Aortic valve regurgitation, unspecified etiology     ICD-10-CM: I35.1 ICD-9-CM: 424.1 Hematuria     ICD-10-CM: R31.9 ICD-9-CM: 599.70 Abnormal finding of blood chemistry     ICD-10-CM: R79.9 ICD-9-CM: 790.6 Screening for alcoholism     ICD-10-CM: Z13.89 ICD-9-CM: V79.1 Screening for depression     ICD-10-CM: Z13.89 ICD-9-CM: V79.0 Vitals BP Pulse Temp Resp Height(growth percentile) Weight(growth percentile) (!) 142/92 (BP 1 Location: Right arm, BP Patient Position: Sitting) (!) 51 95.3 °F (35.2 °C) (Oral) 22 5' 8\" (1.727 m) 179 lb (81.2 kg) SpO2 BMI Smoking Status 100% 27.22 kg/m2 Former Smoker BMI and BSA Data Body Mass Index Body Surface Area  
 27.22 kg/m 2 1.97 m 2 Preferred Pharmacy Pharmacy Name Phone [facility] [phone] Your Updated Medication List  
  
   
This list is accurate as of: 9/26/17  4:06 PM.  Always use your most recent med list.  
  
  
  
  
 COMBIGAN 0.2-0.5 % Drop ophthalmic solution Generic drug:  brimonidine-timolol 1 drop every twelve (12) hours. cyclobenzaprine 10 mg tablet Commonly known as:  FLEXERIL Take 1 Tab by mouth three (3) times daily as needed for Muscle Spasm(s). escitalopram oxalate 10 mg tablet Commonly known as:  Amelia Narrow Take 1 Tab by mouth daily. lansoprazole 30 mg capsule Commonly known as:  PREVACID Take 1 Cap by mouth Daily (before breakfast). latanoprost 0.005 % ophthalmic solution Commonly known as:  Namita Ellen Administer 1 drop to both eyes nightly. Olmesartan-amLODIPine-HCTZ 40-5-25 mg Tab Commonly known as:  [employer] Take 1 Each by mouth daily. Follow-up Instructions Return in about 6 months (around 3/26/2018). Patient Instructions Medicare Wellness Visit, Male The best way to live healthy is to have a healthy lifestyle by eating a well-balanced diet, exercising regularly, limiting alcohol and stopping smoking. Regular physical exams and screening tests are another way to keep healthy. Preventive exams provided by your health care provider can find health problems before they become diseases or illnesses. Preventive services including immunizations, screening tests, monitoring and exams can help you take care of your own health. All people over age 72 should have a pneumovax  and and a prevnar shot to prevent pneumonia. These are once in a lifetime unless you and your provider decide differently. All people over 65 should have a yearly flu shot and a tetanus vaccine every 10 years. Screening for diabetes mellitus with a blood sugar test should be done every year. Glaucoma is a disease of the eye due to increased ocular pressure that can lead to blindness and it should be done every year by an eye professional. 
 
Cardiovascular screening tests that check for elevated lipids (fatty part of blood) which can lead to heart disease and strokes should be done every 5 years. Colorectal screening that evaluates for blood or polyps in your colon should be done yearly as a stool test or every five years as a flexible sigmoidoscope or every 10 years as a colonoscopy up to age 76. Men up to age 76 may need a screening blood test for prostate cancer at certain intervals, depending on their personal and family history. This decision is between the patient and his provider. If you have been a smoker or had family history of abdominal aortic aneurysms, you and your provider may decide to schedule an ultrasound test of your aorta. Hepatitis C screening is also recommended for anyone born between [birth year] through [address]. A shingles vaccine is also recommended once in a lifetime after age 61. Your Medicare Wellness Exam is recommended annually. Here is a list of your current Health Maintenance items with a due date: There are no preventive care reminders to display for this patient. Medicare Wellness Visit, Male The best way to live healthy is to have a healthy lifestyle by eating a well-balanced diet, exercising regularly, limiting alcohol and stopping smoking. Regular physical exams and screening tests are another way to keep healthy. Preventive exams provided by your health care provider can find health problems before they become diseases or illnesses. Preventive services including immunizations, screening tests, monitoring and exams can help you take care of your own health.  
 
All people over age 72 should have a pneumovax  and and a prevnar shot to prevent pneumonia. These are once in a lifetime unless you and your provider decide differently. All people over 65 should have a yearly flu shot and a tetanus vaccine every 10 years. Screening for diabetes mellitus with a blood sugar test should be done every year. Glaucoma is a disease of the eye due to increased ocular pressure that can lead to blindness and it should be done every year by an eye professional. 
 
Cardiovascular screening tests that check for elevated lipids (fatty part of blood) which can lead to heart disease and strokes should be done every 5 years. Colorectal screening that evaluates for blood or polyps in your colon should be done yearly as a stool test or every five years as a flexible sigmoidoscope or every 10 years as a colonoscopy up to age 76. Men up to age 76 may need a screening blood test for prostate cancer at certain intervals, depending on their personal and family history. This decision is between the patient and his provider. If you have been a smoker or had family history of abdominal aortic aneurysms, you and your provider may decide to schedule an ultrasound test of your aorta. Hepatitis C screening is also recommended for anyone born between [birth year] [address]. A shingles vaccine is also recommended once in a lifetime after age 61. Your Medicare Wellness Exam is recommended annually. Here is a list of your current Health Maintenance items with a due date: There are no preventive care reminders to display for this patient. Medicare Wellness Visit, Male The best way to live healthy is to have a healthy lifestyle by eating a well-balanced diet, exercising regularly, limiting alcohol and stopping smoking. Regular physical exams and screening tests are another way to keep healthy. Preventive exams provided by your health care provider can find health problems before they become diseases or illnesses.  Preventive services including immunizations, screening tests, monitoring and exams can help you take care of your own health. All people over age 72 should have a pneumovax  and and a prevnar shot to prevent pneumonia. These are once in a lifetime unless you and your provider decide differently. All people over 65 should have a yearly flu shot and a tetanus vaccine every 10 years. Screening for diabetes mellitus with a blood sugar test should be done every year. Glaucoma is a disease of the eye due to increased ocular pressure that can lead to blindness and it should be done every year by an eye professional. 
 
Cardiovascular screening tests that check for elevated lipids (fatty part of blood) which can lead to heart disease and strokes should be done every 5 years. Colorectal screening that evaluates for blood or polyps in your colon should be done yearly as a stool test or every five years as a flexible sigmoidoscope or every 10 years as a colonoscopy up to age 76. Men up to age 76 may need a screening blood test for prostate cancer at certain intervals, depending on their personal and family history. This decision is between the patient and his provider. If you have been a smoker or had family history of abdominal aortic aneurysms, you and your provider may decide to schedule an ultrasound test of your aorta. Hepatitis C screening is also recommended for anyone born between [birth year] [address]. A shingles vaccine is also recommended once in a lifetime after age 61. Your Medicare Wellness Exam is recommended annually. Here is a list of your current Health Maintenance items with a due date: There are no preventive care reminders to display for this patient. Patient Instructions History Introducing [facility]! Dear Ramila Diaz: Thank you for requesting a [employer] account.   Our records indicate that you have previously registered for a [employer] account but its currently inactive. Please call our [employer] support line at [phone]. Additional Information If you have questions, please visit the Frequently Asked Questions section of the [employer] website at [email or web address] Remember, [employer] is NOT to be used for urgent needs. For medical emergencies, dial 911. Now available from your iPhone and Android! Please provide this summary of care documentation to your next provider. Your primary care clinician is listed as Magdi Mckeon. If you have any questions after today's visit, please call [phone].

## 2017-09-26 NOTE — PATIENT INSTRUCTIONS

## 2018-03-27 ENCOUNTER — OFFICE VISIT (OUTPATIENT)
Dept: INTERNAL MEDICINE CLINIC | Age: 68
End: 2018-03-27

## 2018-03-27 VITALS
SYSTOLIC BLOOD PRESSURE: 137 MMHG | TEMPERATURE: 97.8 F | WEIGHT: 184.4 LBS | HEIGHT: 68 IN | OXYGEN SATURATION: 98 % | HEART RATE: 60 BPM | DIASTOLIC BLOOD PRESSURE: 72 MMHG | BODY MASS INDEX: 27.95 KG/M2 | RESPIRATION RATE: 16 BRPM

## 2018-03-27 DIAGNOSIS — M19.90 ARTHRITIS: ICD-10-CM

## 2018-03-27 DIAGNOSIS — I51.89 DIASTOLIC DYSFUNCTION: ICD-10-CM

## 2018-03-27 DIAGNOSIS — I35.1 AORTIC VALVE INSUFFICIENCY, ETIOLOGY OF CARDIAC VALVE DISEASE UNSPECIFIED: ICD-10-CM

## 2018-03-27 DIAGNOSIS — I10 ESSENTIAL HYPERTENSION: Primary | ICD-10-CM

## 2018-03-27 NOTE — MR AVS SNAPSHOT
[address] [place], [place] [address] 
[phone] Patient: Moo Rich MRN: [id number] MSI:[id number] Visit Information Date & Time Provider Department Dept. Phone Encounter #  
 3/27/2018  3:30 PM Layla Kendall MD [facility] - [facility] [phone] [id number] Your Appointments 8/28/2018  2:30 PM  
Any with Layla Kendall MD  
59 [facility] ([address]) Appt Note: 6 month follow up [address], [place] [address] [facility] 98  
  
   
 [address], [place] [address] [place] Upcoming Health Maintenance Date Due FOBT Q 1 YEAR AGE 50-75 9/26/2018* MEDICARE YEARLY EXAM 9/27/2018 GLAUCOMA SCREENING Q2Y 2/28/2019 DTaP/Tdap/Td series (2 - Td) 1/27/2026 *Topic was postponed. The date shown is not the original due date. Allergies as of 3/27/2018  Review Complete On: 3/27/2018 By: Radha Gutierrez Severity Noted Reaction Type Reactions Sulfa (Sulfonamide Antibiotics)  01/22/2015    Hives Current Immunizations  Never Reviewed No immunizations on file. Not reviewed this visit Vitals BP Pulse Temp Resp Height(growth percentile) Weight(growth percentile) (!) 168/93 60 97.8 °F (36.6 °C) (Oral) 16 5' 8\" (1.727 m) 184 lb 6.4 oz (83.6 kg) SpO2 BMI Smoking Status 98% 28.04 kg/m2 Former Smoker Vitals History BMI and BSA Data Body Mass Index Body Surface Area 28.04 kg/m 2 2 m 2 Preferred Pharmacy Pharmacy Name Phone  [address] [phone] Your Updated Medication List  
  
   
This list is accurate as of 3/27/18  4:09 PM.  Always use your most recent med list.  
  
  
  
  
 COMBIGAN 0.2-0.5 % Drop ophthalmic solution Generic drug:  brimonidine-timolol 1 drop every twelve (12) hours. cyclobenzaprine 10 mg tablet Commonly known as:  FLEXERIL Take 1 Tab by mouth three (3) times daily as needed for Muscle Spasm(s). escitalopram oxalate 10 mg tablet Commonly known as:  Jennifer Hampton Take 1 Tab by mouth daily. lansoprazole 30 mg capsule Commonly known as:  PREVACID Take 1 Cap by mouth Daily (before breakfast). latanoprost 0.005 % ophthalmic solution Commonly known as:  Virlinda Seeds Administer 1 drop to both eyes nightly. Olmesartan-amLODIPine-HCTZ 40-5-25 mg Tab Commonly known as:  [employer] Take 1 Each by mouth daily. Patient Instructions Body Mass Index: Care Instructions Your Care Instructions Body mass index (BMI) can help you see if your weight is raising your risk for health problems. It uses a formula to compare how much you weigh with how tall you are. · A BMI lower than 18.5 is considered underweight. · A BMI between 18.5 and 24.9 is considered healthy. · A BMI between 25 and 29.9 is considered overweight. A BMI of 30 or higher is considered obese. If your BMI is in the normal range, it means that you have a lower risk for weight-related health problems. If your BMI is in the overweight or obese range, you may be at increased risk for weight-related health problems, such as high blood pressure, heart disease, stroke, arthritis or joint pain, and diabetes. If your BMI is in the underweight range, you may be at increased risk for health problems such as fatigue, lower protection (immunity) against illness, muscle loss, bone loss, hair loss, and hormone problems. BMI is just one measure of your risk for weight-related health problems. You may be at higher risk for health problems if you are not active, you eat an unhealthy diet, or you drink too much alcohol or use tobacco products. Follow-up care is a key part of your treatment and safety.  Be sure to make and go to all appointments, and call your doctor if you are having problems. It's also a good idea to know your test results and keep a list of the medicines you take. How can you care for yourself at home? · Practice healthy eating habits. This includes eating plenty of fruits, vegetables, whole grains, lean protein, and low-fat dairy. · If your doctor recommends it, get more exercise. Walking is a good choice. Bit by bit, increase the amount you walk every day. Try for at least 30 minutes on most days of the week. · Do not smoke. Smoking can increase your risk for health problems. If you need help quitting, talk to your doctor about stop-smoking programs and medicines. These can increase your chances of quitting for good. · Limit alcohol to 2 drinks a day for men and 1 drink a day for women. Too much alcohol can cause health problems. If you have a BMI higher than 25 · Your doctor may do other tests to check your risk for weight-related health problems. This may include measuring the distance around your waist. A waist measurement of more than 40 inches in men or 35 inches in women can increase the risk of weight-related health problems. · Talk with your doctor about steps you can take to stay healthy or improve your health. You may need to make lifestyle changes to lose weight and stay healthy, such as changing your diet and getting regular exercise. If you have a BMI lower than 18.5 · Your doctor may do other tests to check your risk for health problems. · Talk with your doctor about steps you can take to stay healthy or improve your health. You may need to make lifestyle changes to gain or maintain weight and stay healthy, such as getting more healthy foods in your diet and doing exercises to build muscle. Where can you learn more? Go to http://sesar-deyanira.info/. Enter S176 in the search box to learn more about \"Body Mass Index: Care Instructions. \" Current as of: October 13, 2016 Content Version: 11.4 © 4356-2013 Healthwise, Incorporated. Care instructions adapted under license by [employer] (which disclaims liability or warranty for this information). If you have questions about a medical condition or this instruction, always ask your healthcare professional. [address] any warranty or liability for your use of this information. Introducing [facility]! [facility] introduces Bahu patient portal. Now you can access parts of your medical record, email your doctor's office, and request medication refills online. 1. In your internet browser, go to [email or web address] 2. Click on the First Time User? Click Here link in the Sign In box. You will see the New Member Sign Up page. 3. Enter your Bahu Access Code exactly as it appears below. You will not need to use this code after youve completed the sign-up process. If you do not sign up before the expiration date, you must request a new code. · Bahu Access Code: [email or web address] Expires: 6/25/2018  3:57 PM 
 
4. Enter the last four digits of your Social Security Number (xxxx) and Date of Birth (mm/dd/yyyy) as indicated and click Submit. You will be taken to the next sign-up page. 5. Create a Bahu ID. This will be your Bahu login ID and cannot be changed, so think of one that is secure and easy to remember. 6. Create a Bahu password. You can change your password at any time. 7. Enter your Password Reset Question and Answer. This can be used at a later time if you forget your password. 8. Enter your e-mail address. You will receive e-mail notification when new information is available in [address] 9. Click Sign Up. You can now view and download portions of your medical record. 10. Click the Download Summary menu link to download a portable copy of your medical information.  
 
If you have questions, please visit the Frequently Asked Questions section of the [employer]. Remember, [employer] is NOT to be used for urgent needs. For medical emergencies, dial 911. Now available from your iPhone and Android! Please provide this summary of care documentation to your next provider. Your primary care clinician is listed as Lane Ko. If you have any questions after today's visit, please call [phone].

## 2018-03-27 NOTE — PATIENT INSTRUCTIONS
Body Mass Index: Care Instructions  Your Care Instructions    Body mass index (BMI) can help you see if your weight is raising your risk for health problems. It uses a formula to compare how much you weigh with how tall you are. · A BMI lower than 18.5 is considered underweight. · A BMI between 18.5 and 24.9 is considered healthy. · A BMI between 25 and 29.9 is considered overweight. A BMI of 30 or higher is considered obese. If your BMI is in the normal range, it means that you have a lower risk for weight-related health problems. If your BMI is in the overweight or obese range, you may be at increased risk for weight-related health problems, such as high blood pressure, heart disease, stroke, arthritis or joint pain, and diabetes. If your BMI is in the underweight range, you may be at increased risk for health problems such as fatigue, lower protection (immunity) against illness, muscle loss, bone loss, hair loss, and hormone problems. BMI is just one measure of your risk for weight-related health problems. You may be at higher risk for health problems if you are not active, you eat an unhealthy diet, or you drink too much alcohol or use tobacco products. Follow-up care is a key part of your treatment and safety. Be sure to make and go to all appointments, and call your doctor if you are having problems. It's also a good idea to know your test results and keep a list of the medicines you take. How can you care for yourself at home? · Practice healthy eating habits. This includes eating plenty of fruits, vegetables, whole grains, lean protein, and low-fat dairy. · If your doctor recommends it, get more exercise. Walking is a good choice. Bit by bit, increase the amount you walk every day. Try for at least 30 minutes on most days of the week. · Do not smoke. Smoking can increase your risk for health problems. If you need help quitting, talk to your doctor about stop-smoking programs and medicines. These can increase your chances of quitting for good. · Limit alcohol to 2 drinks a day for men and 1 drink a day for women. Too much alcohol can cause health problems. If you have a BMI higher than 25  · Your doctor may do other tests to check your risk for weight-related health problems. This may include measuring the distance around your waist. A waist measurement of more than 40 inches in men or 35 inches in women can increase the risk of weight-related health problems. · Talk with your doctor about steps you can take to stay healthy or improve your health. You may need to make lifestyle changes to lose weight and stay healthy, such as changing your diet and getting regular exercise. If you have a BMI lower than 18.5  · Your doctor may do other tests to check your risk for health problems. · Talk with your doctor about steps you can take to stay healthy or improve your health. You may need to make lifestyle changes to gain or maintain weight and stay healthy, such as getting more healthy foods in your diet and doing exercises to build muscle. Where can you learn more? Go to http://sesar-deyanira.info/. Enter S176 in the search box to learn more about \"Body Mass Index: Care Instructions. \"  Current as of: October 13, 2016  Content Version: 11.4  © 9856-1879 Healthwise, Incorporated. Care instructions adapted under license by [employer] (which disclaims liability or warranty for this information). If you have questions about a medical condition or this instruction, always ask your healthcare professional. [address] any warranty or liability for your use of this information.

## 2018-03-27 NOTE — PROGRESS NOTES
1. Have you been to the ER, urgent care clinic since your last visit? Hospitalized since your last visit? No    2. Have you seen or consulted any other health care providers outside of the [address] [place] [address] since your last visit? Include any pap smears or colon screening.  No

## 2018-03-27 NOTE — PROGRESS NOTES
Patient returns today with known history of primary hypertension, moderate aortic insufficiency, followed by cardiology, Makayla Bustamante MD, degenerative disc disease, diastolic dysfunction, low back pain, and is seen for evaluation.

## 2018-03-27 NOTE — PROGRESS NOTES
[facility]  Lashanda Pinto MD, [address] [address]  Phone:  [phone]  Fax: [phone]       Chief Complaint   Patient presents with    Hypertension     f/u   . SUBJECTIVE:    Tori Carrero is a 78 y.o. male Patient returns today with known history of moderate aortic insufficiency, followed by Alvaro Quinones MD, whom he saw recently and gave him a good report, degenerative disc disease, diastolic dysfunction, glaucoma, hypertension, low back pain and is seen for evaluation. He usually walks at least two miles a day if not more, but didn't do so today or yesterday because of the chilliness of the air. Patient denies specific complaints and is seen for evaluation. Current Outpatient Prescriptions   Medication Sig Dispense Refill    cyclobenzaprine (FLEXERIL) 10 mg tablet Take 1 Tab by mouth three (3) times daily as needed for Muscle Spasm(s). 180 Tab 4    escitalopram oxalate (LEXAPRO) 10 mg tablet Take 1 Tab by mouth daily. 90 Tab 11    Olmesartan-amLODIPine-HCTZ (TRIBENZOR) 40-5-25 mg tab Take 1 Each by mouth daily. 90 Tab 3    lansoprazole (PREVACID) 30 mg capsule Take 1 Cap by mouth Daily (before breakfast). 30 Cap 11    brimonidine-timolol (COMBIGAN) 0.2-0.5 % drop ophthalmic solution 1 drop every twelve (12) hours.  latanoprost (XALATAN) 0.005 % ophthalmic solution Administer 1 drop to both eyes nightly.        Past Medical History:   Diagnosis Date    Aortic insufficiency 08/30/2015    moderate - leno dangelo md    Arthritis     DDD (degenerative disc disease)     Diastolic dysfunction     Glaucoma     Hypertension     LBP (low back pain)     Mild aortic insufficiency 03/18/2016    S/P colonoscopy 4-13-15    hemorrhoids     Past Surgical History:   Procedure Laterality Date    ABDOMEN SURGERY PROC UNLISTED      bilateral hernia repair    HX COLONOSCOPY       Allergies   Allergen Reactions    Sulfa (Sulfonamide Antibiotics) Hives         REVIEW OF SYSTEMS:  General: negative for - chills or fever  ENT: negative for - headaches, nasal congestion or tinnitus  Respiratory: negative for - cough, hemoptysis, shortness of breath or wheezing  Cardiovascular : negative for - chest pain, edema, palpitations or shortness of breath  Gastrointestinal: negative for - abdominal pain, blood in stools, heartburn or nausea/vomiting  Genito-Urinary: no dysuria, trouble voiding, or hematuria  Musculoskeletal: negative for - gait disturbance, joint pain, joint stiffness or joint swelling  Neurological: no TIA or stroke symptoms  Hematologic: no bruises, no bleeding, no swollen glands  Integument: no lumps, mole changes, nail changes or rash  Endocrine: no malaise/lethargy or unexpected weight changes      Social History     Social History    Marital status:      Spouse name: N/A    Number of children: N/A    Years of education: N/A     Social History Main Topics    Smoking status: Former Smoker    Smokeless tobacco: Never Used    Alcohol use No    Drug use: None    Sexual activity: Not Asked     Other Topics Concern    None     Social History Narrative    Medical History: Primary hypertensionDepressive disordernormal head CT May 15, 2007Mild aortic regurgitationDiastolic dysfunctionnormal left    ventricular systolic function ejection fraction 50% echocardiogram 2011negative stress w all motion study 2001 ejection    fraction 48%GlaucomaDecember 2013 CT abdomen and pelvis degenerative changes lumbar spine no acute process    Surgical History: hernia repair 2012colonoscopy 2005[facility] 2014    Hospitalization/Major Diagnostic Procedure: Denies Past Hospitalization    Family History: Mother: alive, hypertension,Father:  79 yrs, chfSister(s): aliveBrother(s): aliveDaughter(s): aliveSon(s):    aliveChildren: aliveAdopted: deceased2 brother(s) , 2 sister(s) .  1 son(s) , 1 daughter(s) . Social History: Alcohol Use Patient does not use alcohol. Smoking Status Patient is a never smoker. Exercise: running. Marital Status:    . Lives w ith: spouse - recently had bunion surgery - eric. 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 Plans to    retire August 31, 2012. Education/School: high school. Family History   Problem Relation Age of Onset    Heart Disease Father        OBJECTIVE:    Visit Vitals    BP (!) 168/93    Pulse 60    Temp 97.8 °F (36.6 °C) (Oral)    Resp 16    Ht 5' 8\" (1.727 m)    Wt 184 lb 6.4 oz (83.6 kg)    SpO2 98%    BMI 28.04 kg/m2     CONSTITUTIONAL: well , well nourished, appears age appropriate  EYES: perrla, eom intact  ENMT:moist mucous membranes, pharynx clear  NECK: supple. Thyroid normal  RESPIRATORY: Chest: clear bilaterally   CARDIOVASCULAR: Heart: regular rate and rhythm  GASTROINTESTINAL: Abdomen: soft, bowel sounds active  HEMATOLOGIC: no pathological lymph nodes palpated  MUSCULOSKELETAL: Extremities: no edema, pulse 1+   INTEGUMENT: No unusual rashes or suspicious skin lesions noted. Nails appear normal.  NEUROLOGIC: non-focal exam   MENTAL STATUS: alert and oriented, appropriate affect           ASSESSMENT:  1. Essential hypertension    2. Aortic valve insufficiency, etiology of cardiac valve disease unspecified    3. Diastolic dysfunction    4. Arthritis      Patient's medical status is stable. He has typical white coat hypertension, which at home his blood pressure is in the 120s-130s, although sometimes drops down to 90 systolic. BMI approaches ideal body weight as discussed below, so we continue to encourage physical activity and a heart healthy, weight reducing diet. His cardiac status is stable, in fact aortic insufficiency murmur is difficult to auscultate today, suggesting some improvement I suppose. Cardiologist also suggested he may have some improvement of his leaky valve.     He'll return to the office in four to six months, sooner if he has any problems. At that time he'll require laboratory studies. PLAN:  . No orders of the defined types were placed in this encounter. Follow-up Disposition:  Return in about 4 months (around 7/27/2018). ATTENTION:   This medical record was transcribed using an electronic medical records system. Although proofread, it may and can contain electronic and spelling errors. Other human spelling and other errors may be present. Corrections may be executed at a later time. Please feel free to contact us for any clarifications as needed.

## 2018-03-27 NOTE — ACP (ADVANCE CARE PLANNING)

## 2018-04-25 RX ORDER — OLMESARTAN MEDOXOMIL, AMLODIPINE AND HYDROCHLOROTHIAZIDE TABLET 40/5/25 MG 40; 5; 25 MG/1; MG/1; MG/1
1 TABLET ORAL DAILY
Qty: 90 TAB | Refills: 3 | Status: SHIPPED | OUTPATIENT
Start: 2018-04-25 | End: 2019-05-20 | Stop reason: SDUPTHER

## 2018-06-14 ENCOUNTER — OFFICE VISIT (OUTPATIENT)
Dept: INTERNAL MEDICINE CLINIC | Age: 68
End: 2018-06-14

## 2018-06-14 VITALS
HEART RATE: 54 BPM | SYSTOLIC BLOOD PRESSURE: 148 MMHG | BODY MASS INDEX: 27.89 KG/M2 | HEIGHT: 68 IN | WEIGHT: 184 LBS | RESPIRATION RATE: 20 BRPM | DIASTOLIC BLOOD PRESSURE: 92 MMHG | TEMPERATURE: 95.8 F | OXYGEN SATURATION: 98 %

## 2018-06-14 DIAGNOSIS — I51.89 DIASTOLIC DYSFUNCTION: ICD-10-CM

## 2018-06-14 DIAGNOSIS — R79.9 ABNORMAL FINDING OF BLOOD CHEMISTRY: ICD-10-CM

## 2018-06-14 DIAGNOSIS — H93.11 TINNITUS OF RIGHT EAR: Primary | ICD-10-CM

## 2018-06-14 DIAGNOSIS — R35.1 NOCTURIA: ICD-10-CM

## 2018-06-14 DIAGNOSIS — M19.90 ARTHRITIS: ICD-10-CM

## 2018-06-14 DIAGNOSIS — I35.1 AORTIC VALVE INSUFFICIENCY, ETIOLOGY OF CARDIAC VALVE DISEASE UNSPECIFIED: ICD-10-CM

## 2018-06-14 DIAGNOSIS — I10 ESSENTIAL HYPERTENSION: ICD-10-CM

## 2018-06-14 NOTE — PROGRESS NOTES
1. Have you been to the ER, urgent care clinic since your last visit? Hospitalized since your last visit? Yes Reason for visit: right ear pain    2. Have you seen or consulted any other health care providers outside of the [facility] since your last visit? Include any pap smears or colon screening.  Yes [facility]

## 2018-06-14 NOTE — MR AVS SNAPSHOT
[place], [place] [address] [facility] [place] 
[phone] Patient: Evin Dowling MRN: [id number] Visit Information Date & Time Provider Department Dept. Phone Encounter #  
 2018  3:30 PM Elyse Palacios MD SPORTS MED AND PRIMARY CARE - Patricia Mahana [phone] [id number] Follow-up Instructions Return in about 6 months (around 2018). Follow-up and Disposition History Your Appointments 2018  2:30 PM  
Any with Elyse Palacios MD  
[facility] ([facility]) Appt Note: 6 month follow up [address], [place] [address] [facility] 98  
  
   
 [address], [place] [address] [facility] [place] Upcoming Health Maintenance Date Due FOBT Q 1 YEAR AGE 50-75 2018* Influenza Age 5 to Adult 2018 MEDICARE YEARLY EXAM 2018 GLAUCOMA SCREENING Q2Y 2019 DTaP/Tdap/Td series (2 - Td) 2026 *Topic was postponed. The date shown is not the original due date. Allergies as of 2018  Review Complete On: 2018 By: Elyse Palacios MD  
  
 Severity Noted Reaction Type Reactions Sulfa (Sulfonamide Antibiotics)  2015    Hives Current Immunizations  Never Reviewed No immunizations on file. Not reviewed this visit You Were Diagnosed With   
  
 Codes Comments Tinnitus of right ear    -  Primary ICD-10-CM: H93.11 ICD-9-CM: 388.30 Arthritis     ICD-10-CM: M19.90 ICD-9-CM: 716.90 Essential hypertension     ICD-10-CM: I10 
ICD-9-CM: 401.9 Diastolic dysfunction     SAZ-66-NZ: I51.9 ICD-9-CM: 429.9 Aortic valve insufficiency, etiology of cardiac valve disease unspecified     ICD-10-CM: I35.1 ICD-9-CM: 424.1 Nocturia     ICD-10-CM: R35.1 ICD-9-CM: 788.43 Abnormal finding of blood chemistry     ICD-10-CM: R79.9 ICD-9-CM: 790.6 Vitals BP Pulse Temp Resp Height(growth percentile) Weight(growth percentile) (!) 148/92 (BP 1 Location: Right arm, BP Patient Position: Sitting) (!) 54 95.8 °F (35.4 °C) (Oral) 20 5' 8\" (1.727 m) 184 lb (83.5 kg) SpO2 BMI Smoking Status 98% 27.98 kg/m2 Former Smoker BMI and BSA Data Body Mass Index Body Surface Area  
 27.98 kg/m 2 2 m 2 Preferred Pharmacy Pharmacy Name Phone [facility][id number] Ruiz [facility], [address] [place] [address] [phone] Your Updated Medication List  
  
   
This list is accurate as of 6/14/18  4:43 PM.  Always use your most recent med list.  
  
  
  
  
 COMBIGAN 0.2-0.5 % Drop ophthalmic solution Generic drug:  brimonidine-timolol 1 drop every twelve (12) hours. cyclobenzaprine 10 mg tablet Commonly known as:  FLEXERIL Take 1 Tab by mouth three (3) times daily as needed for Muscle Spasm(s). escitalopram oxalate 10 mg tablet Commonly known as:  Carlos Carolus Take 1 Tab by mouth daily. lansoprazole 30 mg capsule Commonly known as:  PREVACID Take 1 Cap by mouth Daily (before breakfast). latanoprost 0.005 % ophthalmic solution Commonly known as:  Libby Adjutant Administer 1 drop to both eyes nightly. Olmesartan-amLODIPine-HCTZ 40-5-25 mg Tab Commonly known as:  [employer] Take 1 Each by mouth daily. We Performed the Following CBC WITH AUTOMATED DIFF [48446 CPT(R)] COLLECTION VENOUS BLOOD,VENIPUNCTURE [id number] CPT(R)] HEMOGLOBIN A1C WITH EAG [35064 CPT(R)] LIPID PANEL [05628 CPT(R)] METABOLIC PANEL, COMPREHENSIVE [73900 CPT(R)] PSA, DIAGNOSTIC (PROSTATE SPECIFIC AG) [id number] CPT(R)] REFERRAL TO ENT-OTOLARYNGOLOGY [QGY17 Custom] URINALYSIS W/ RFLX MICROSCOPIC [02456 CPT(R)] Follow-up Instructions Return in about 6 months (around 12/14/2018). To-Do List   
 06/14/2018   ECHO:  2D ECHO COMPLETE ADULT (TTE) W OR WO CONTR   
 Referral Information Referral ID Referred By Referred To  
  
 [id number] Caitlin Pino MD   
   [address] [facility] [address] [place] [address], [address] [place] [address] Phone: [phone] Fax: [phone] Visits Status Start Date End Date 1 New Request 6/14/18 6/14/19 If your referral has a status of pending review or denied, additional information will be sent to support the outcome of this decision. Introducing [facility]! Kanchan Liu introduces [employer] patient portal. Now you can access parts of your medical record, email your doctor's office, and request medication refills online. 1. In your internet browser, go to [email or web address] 2. Click on the First Time User? Click Here link in the Sign In box. You will see the New Member Sign Up page. 3. Enter your [employer] Access Code exactly as it appears below. You will not need to use this code after youve completed the sign-up process. If you do not sign up before the expiration date, you must request a new code. · [employer] Access Code: [email or web address] Expires: 6/25/2018  3:57 PM 
 
4. Enter the last four digits of your Social Security Number (xxxx) and Date of Birth (mm/dd/yyyy) as indicated and click Submit. You will be taken to the next sign-up page. 5. Create a [employer] ID. This will be your [employer] login ID and cannot be changed, so think of one that is secure and easy to remember. 6. Create a [employer] password. You can change your password at any time. 7. Enter your Password Reset Question and Answer. This can be used at a later time if you forget your password. 8. Enter your e-mail address. You will receive e-mail notification when new information is available in [address] 9. Click Sign Up. You can now view and download portions of your medical record. 10. Click the Download Summary menu link to download a portable copy of your medical information. If you have questions, please visit the Frequently Asked Questions section of the [employer] website. Remember, [employer] is NOT to be used for urgent needs. For medical emergencies, dial 911. Now available from your iPhone and Android! Please provide this summary of care documentation to your next provider. Your primary care clinician is listed as Bryce Garcia. If you have any questions after today's visit, please call [phone].

## 2018-06-14 NOTE — PROGRESS NOTES
SPORTS MEDICINE AND PRIMARY CARE  Osiel Colmenares MD, [facility], [place] [address] [address]  Phone:  [phone]  Fax: [phone]       Chief Complaint   Patient presents with    Hypertension    Ringing in Ear     patient states that he has a roaring noise in his right ear. .      SUBJECTIVE:    Rafi Monroy is a 80 y.o. male Patient returns today with known history of primary hypertension, aortic insufficiency, followed by Cristhian Patel MD, diastolic dysfunction, glaucoma, seen for evaluation. Since we last saw him he developed the onset of tinnitus in his right ear. He went to [facility], but they told him because of his history of glaucoma they wouldn't give him any medications. Patient sees us for follow up. Current Outpatient Prescriptions   Medication Sig Dispense Refill    Olmesartan-amLODIPine-HCTZ (TRIBENZOR) 40-5-25 mg tab Take 1 Each by mouth daily. 90 Tab 3    cyclobenzaprine (FLEXERIL) 10 mg tablet Take 1 Tab by mouth three (3) times daily as needed for Muscle Spasm(s). 180 Tab 4    escitalopram oxalate (LEXAPRO) 10 mg tablet Take 1 Tab by mouth daily. 90 Tab 11    lansoprazole (PREVACID) 30 mg capsule Take 1 Cap by mouth Daily (before breakfast). 30 Cap 11    brimonidine-timolol (COMBIGAN) 0.2-0.5 % drop ophthalmic solution 1 drop every twelve (12) hours.  latanoprost (XALATAN) 0.005 % ophthalmic solution Administer 1 drop to both eyes nightly.        Past Medical History:   Diagnosis Date    Aortic insufficiency 08/30/2015    moderate - leno dangelo md    Arthritis     DDD (degenerative disc disease)     Diastolic dysfunction     Glaucoma     Hypertension     LBP (low back pain)     Mild aortic insufficiency 03/18/2016    S/P colonoscopy 4-13-15    hemorrhoids    Tinnitus of right ear 06/14/2018     Past Surgical History:   Procedure Laterality Date    ABDOMEN SURGERY PROC UNLISTED      bilateral hernia repair    HX COLONOSCOPY Allergies   Allergen Reactions    Sulfa (Sulfonamide Antibiotics) Hives         REVIEW OF SYSTEMS:  General: negative for - chills or fever  ENT: negative for - headaches, nasal congestion or tinnitus  Respiratory: negative for - cough, hemoptysis, shortness of breath or wheezing  Cardiovascular : negative for - chest pain, edema, palpitations or shortness of breath  Gastrointestinal: negative for - abdominal pain, blood in stools, heartburn or nausea/vomiting  Genito-Urinary: no dysuria, trouble voiding, or hematuria  Musculoskeletal: negative for - gait disturbance, joint pain, joint stiffness or joint swelling  Neurological: no TIA or stroke symptoms  Hematologic: no bruises, no bleeding, no swollen glands  Integument: no lumps, mole changes, nail changes or rash  Endocrine: no malaise/lethargy or unexpected weight changes      Social History     Social History    Marital status:      Spouse name: N/A    Number of children: N/A    Years of education: N/A     Social History Main Topics    Smoking status: Former Smoker    Smokeless tobacco: Never Used    Alcohol use No    Drug use: None    Sexual activity: Not Asked     Other Topics Concern    None     Social History Narrative    Medical History: Primary hypertensionDepressive disordernormal head CT May 15, 2007Mild aortic regurgitationDiastolic dysfunctionnormal left    ventricular systolic function ejection fraction 50% echocardiogram 2011negative stress w all motion study 2001 ejection    fraction 48%GlaucomaDecember 2013 CT abdomen and pelvis degenerative changes lumbar spine no acute process    Surgical History: hernia repair 2012colonoscopy 2005[facility] 2014    Hospitalization/Major Diagnostic Procedure: Denies Past Hospitalization    Family History:  Mother: alive, hypertension,Father:  79 yrs, chfSister(s): aliveBrother(s): aliveDaughter(s): aliveSon(s):    aliveChildren: aliveAdopted: deceased2 brother(s) , 2 sister(s) . 1 son(s) , 1 daughter(s) . Social History: Alcohol Use Patient does not use alcohol. Smoking Status Patient is a never smoker. Exercise: running. Marital Status:    . Lives w ith: spouse - recently had bunion surgery - eric. 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 Plans to    retire August 31, 2012. Education/School: high school. Family History   Problem Relation Age of Onset    Heart Disease Father        OBJECTIVE:    Visit Vitals    BP (!) 148/92 (BP 1 Location: Right arm, BP Patient Position: Sitting)    Pulse (!) 54    Temp 95.8 °F (35.4 °C) (Oral)    Resp 20    Ht 5' 8\" (1.727 m)    Wt 184 lb (83.5 kg)    SpO2 98%    BMI 27.98 kg/m2     CONSTITUTIONAL: well , well nourished, appears age appropriate  EYES: perrla, eom intact  ENMT:moist mucous membranes, pharynx clear  NECK: supple. Thyroid normal  RESPIRATORY: Chest: clear bilaterally   CARDIOVASCULAR: Heart: regular rate and rhythm  GASTROINTESTINAL: Abdomen: soft, bowel sounds active  HEMATOLOGIC: no pathological lymph nodes palpated  MUSCULOSKELETAL: Extremities: no edema, pulse 1+   INTEGUMENT: No unusual rashes or suspicious skin lesions noted. Nails appear normal.  NEUROLOGIC: non-focal exam   MENTAL STATUS: alert and oriented, appropriate affect           ASSESSMENT:  1. Tinnitus of right ear    2. Arthritis    3. Essential hypertension    4. Diastolic dysfunction    5. Aortic valve insufficiency, etiology of cardiac valve disease unspecified    6. Nocturia     7. Abnormal finding of blood chemistry       For the tinnitus we refer him to ENT for their opinion. It's been three years since we did his echocardiogram.  Will ask for an echocardiogram to look at his aortic insufficiency. Appropriate lab studies have been requested. We encouraged continued activity for 30 minutes five days a week.   He'll return to the office in six months or a year.         PLAN:  .  Orders Placed This Encounter    URINALYSIS W/ RFLX MICROSCOPIC    CBC WITH AUTOMATED DIFF    METABOLIC PANEL, COMPREHENSIVE    LIPID PANEL    PROSTATE SPECIFIC AG    HEMOGLOBIN A1C WITH EAG    REFERRAL TO ENT-OTOLARYNGOLOGY    2D ECHO COMPLETE ADULT (TTE) W OR WO CONTR       Follow-up Disposition:  Return in about 6 months (around 12/14/2018). ATTENTION:   This medical record was transcribed using an electronic medical records system. Although proofread, it may and can contain electronic and spelling errors. Other human spelling and other errors may be present. Corrections may be executed at a later time. Please feel free to contact us for any clarifications as needed.

## 2018-06-16 DIAGNOSIS — E78.5 DYSLIPIDEMIA: Primary | ICD-10-CM

## 2018-06-16 LAB
ABSOLUTE BANDS, 67058: NORMAL
ABSOLUTE BLASTS: NORMAL
ABSOLUTE METAMYELOCYTES, 900360: NORMAL
ABSOLUTE MYELOCYTES: NORMAL
ABSOLUTE NRBC,ANRBC: NORMAL
ABSOLUTE PROMYELOCYTES: NORMAL
ALB/GLOBRATIO, 58C: 1.5 (CALC) (ref 1–2.5)
ALBUMIN SERPL-MCNC: 4.4 G/DL (ref 3.6–5.1)
ALP SERPL-CCNC: 67 U/L (ref 40–115)
ALT SERPL-CCNC: 16 U/L (ref 9–46)
AMORPHOUS SEDIMENT: NORMAL
APPEARANCE UR: CLEAR
AST SERPL W P-5'-P-CCNC: 17 U/L (ref 10–35)
BACTERIA,BACTU: NORMAL
BANDS,BANDS: NORMAL
BASOPHILS # BLD: 43 CELLS/UL (ref 0–200)
BASOPHILS NFR BLD: 0.6 %
BILIRUB SERPL-MCNC: 0.7 MG/DL (ref 0.2–1.2)
BILIRUB UR QL: NEGATIVE
BILIRUB UR QL: NEGATIVE
BLASTS,BLAST: NORMAL
BUN SERPL-MCNC: 14 MG/DL (ref 7–25)
BUN/CREATININE RATIO,BUCR: ABNORMAL (CALC) (ref 6–22)
CA OXALATE CRYSTALS,CAOXC: NORMAL
CALCIUM SERPL-MCNC: 10 MG/DL (ref 8.6–10.3)
CASTS,URINE,CSTS: NORMAL
CHLORIDE SERPL-SCNC: 102 MMOL/L (ref 98–110)
CHOL/HDL RATIO,CHHDX: 4.4 (CALC)
CHOLEST SERPL-MCNC: 279 MG/DL
CO2 SERPL-SCNC: 32 MMOL/L (ref 20–31)
COLOR UR: YELLOW
COMMENT(S): NORMAL
COMMENT, 35578784: NORMAL
CREAT SERPL-MCNC: 1.04 MG/DL (ref 0.7–1.25)
CRYSTALS,UCRY: NORMAL
EAG (MG/DL),9916804: 94 (CALC)
EAG (MMOL/L),9916805: 5.2 (CALC)
EOSINOPHIL # BLD: 291 CELLS/UL (ref 15–500)
EOSINOPHIL NFR BLD: 4.1 %
ERYTHROCYTE [DISTWIDTH] IN BLOOD BY AUTOMATED COUNT: 13.4 % (ref 11–15)
GLOBULIN,GLOB: 3 G/DL (CALC) (ref 1.9–3.7)
GLUCOSE SERPL-MCNC: 80 MG/DL (ref 65–99)
GRANULAR CAST,GRCST: NORMAL
HBA1C MFR BLD HPLC: 4.9 % OF TOTAL HGB
HCT VFR BLD AUTO: 46.1 % (ref 38.5–50)
HDLC SERPL-MCNC: 64 MG/DL
HGB BLD-MCNC: 15.9 G/DL (ref 13.2–17.1)
HGB UR QL STRIP: NEGATIVE
HYALINE CAST,HYCST: NORMAL
KETONES UR QL STRIP.AUTO: NEGATIVE
LDL-CHOLESTEROL: 188 MG/DL (CALC)
LEUKOCYTE ESTERASE: NEGATIVE
LYMPHOCYTES # BLD: 2229 CELLS/UL (ref 850–3900)
LYMPHOCYTES NFR BLD: 31.4 %
MCH RBC QN AUTO: 30.3 PG (ref 27–33)
MCHC RBC AUTO-ENTMCNC: 34.5 G/DL (ref 32–36)
MCV RBC AUTO: 87.8 FL (ref 80–100)
METAMYELOCYTES,METAS: NORMAL
MONOCYTES # BLD: 710 CELLS/UL (ref 200–950)
MONOCYTES NFR BLD: 10 %
MYELOCYTES,MYELO: NORMAL
NEUTROPHILS # BLD AUTO: 3827 CELLS/UL (ref 1500–7800)
NEUTROPHILS # BLD: 53.9 %
NITRITE UR QL STRIP.AUTO: NEGATIVE
NON-HDL CHOLESTEROL, 011976: 215 MG/DL (CALC)
NRBC: NORMAL
PH UR STRIP: 6 [PH] (ref 5–8)
PLATELET # BLD AUTO: 233 THOUSAND/UL (ref 140–400)
PMV BLD AUTO: 11.8 FL (ref 7.5–12.5)
POTASSIUM SERPL-SCNC: 4.5 MMOL/L (ref 3.5–5.3)
PROMYELOCYTES,PRO: NORMAL
PROT SERPL-MCNC: 7.4 G/DL (ref 6.1–8.1)
PROT UR STRIP-MCNC: NEGATIVE MG/DL
PSA TOTAL,4108: 1 NG/ML
RBC # BLD AUTO: 5.25 MILLION/UL (ref 4.2–5.8)
RBC #/AREA URNS HPF: NORMAL /[HPF]
REACTIVE LYMPHS: NORMAL
REDUCING SUBSTANCES: NORMAL
RENAL EPITHELIAL CELLS, 6131: NORMAL
SODIUM SERPL-SCNC: 141 MMOL/L (ref 135–146)
SP GR UR REFRACTOMETRY: 1.01 (ref 1–1.03)
SQUAMOUS EPITHELIAL CELLS: NORMAL
TRANSITIONAL EPITHELIAL CELLS, 6132: NORMAL
TRIGL SERPL-MCNC: 131 MG/DL (ref ?–150)
TRIPLE PHOS. CRYSTAL,TRIPC: NORMAL
URATE CRY URNS QL MICRO: NORMAL
WBC # BLD AUTO: 7.1 THOUSAND/UL (ref 3.8–10.8)
WBC URNS QL MICRO: NORMAL
YEAST URINE, 5174: NORMAL

## 2018-06-16 RX ORDER — ROSUVASTATIN CALCIUM 10 MG/1
10 TABLET, COATED ORAL
Qty: 30 TAB | Refills: 11 | Status: SHIPPED | OUTPATIENT
Start: 2018-06-16 | End: 2019-06-18 | Stop reason: SINTOL

## 2018-12-12 RX ORDER — ESCITALOPRAM OXALATE 10 MG/1
10 TABLET ORAL DAILY
Qty: 90 TAB | Refills: 3 | Status: SHIPPED | OUTPATIENT
Start: 2018-12-12 | End: 2018-12-13 | Stop reason: SDUPTHER

## 2018-12-13 RX ORDER — ESCITALOPRAM OXALATE 10 MG/1
10 TABLET ORAL DAILY
Qty: 90 TAB | Refills: 3 | Status: SHIPPED | OUTPATIENT
Start: 2018-12-13 | End: 2018-12-18 | Stop reason: SDUPTHER

## 2018-12-18 ENCOUNTER — OFFICE VISIT (OUTPATIENT)
Dept: INTERNAL MEDICINE CLINIC | Age: 68
End: 2018-12-18

## 2018-12-18 VITALS
BODY MASS INDEX: 28.89 KG/M2 | DIASTOLIC BLOOD PRESSURE: 96 MMHG | OXYGEN SATURATION: 97 % | HEART RATE: 60 BPM | SYSTOLIC BLOOD PRESSURE: 159 MMHG | RESPIRATION RATE: 16 BRPM | HEIGHT: 68 IN | TEMPERATURE: 97.8 F | WEIGHT: 190.6 LBS

## 2018-12-18 DIAGNOSIS — I35.1 AORTIC VALVE INSUFFICIENCY, ETIOLOGY OF CARDIAC VALVE DISEASE UNSPECIFIED: ICD-10-CM

## 2018-12-18 DIAGNOSIS — Z00.00 MEDICARE ANNUAL WELLNESS VISIT, SUBSEQUENT: Primary | ICD-10-CM

## 2018-12-18 DIAGNOSIS — Z13.39 SCREENING FOR ALCOHOLISM: ICD-10-CM

## 2018-12-18 DIAGNOSIS — I10 ESSENTIAL HYPERTENSION: ICD-10-CM

## 2018-12-18 DIAGNOSIS — Z13.31 SCREENING FOR DEPRESSION: ICD-10-CM

## 2018-12-18 DIAGNOSIS — I51.89 DIASTOLIC DYSFUNCTION: ICD-10-CM

## 2018-12-18 DIAGNOSIS — E78.5 DYSLIPIDEMIA: ICD-10-CM

## 2018-12-18 DIAGNOSIS — M19.90 ARTHRITIS: ICD-10-CM

## 2018-12-18 RX ORDER — CEFUROXIME AXETIL 500 MG/1
500 TABLET ORAL 2 TIMES DAILY
Qty: 20 TAB | Refills: 0 | Status: SHIPPED | OUTPATIENT
Start: 2018-12-18 | End: 2018-12-28

## 2018-12-18 RX ORDER — ESCITALOPRAM OXALATE 10 MG/1
10 TABLET ORAL DAILY
Qty: 90 TAB | Refills: 3 | Status: SHIPPED | OUTPATIENT
Start: 2018-12-18 | End: 2019-12-28

## 2018-12-18 NOTE — PROGRESS NOTES
1. Have you been to the ER, urgent care clinic since your last visit? Hospitalized since your last visit? No    2. Have you seen or consulted any other health care providers outside of the [address] [place] [address] since your last visit? Include any pap smears or colon screening. No     Wants to discuss sinus issues  This is the Subsequent Medicare Annual Wellness Exam, performed 12 months or more after the Initial AWV or the last Subsequent AWV    I have reviewed the patient's medical history in detail and updated the computerized patient record. History     Past Medical History:   Diagnosis Date    Aortic insufficiency 08/30/2015    dangelo - leno dangelo md    Arthritis     DDD (degenerative disc disease)     Diastolic dysfunction     Dyslipidemia     Glaucoma     Hypertension     LBP (low back pain)     Mild aortic insufficiency 03/18/2016    S/P colonoscopy 4-13-15    hemorrhoids    Tinnitus of right ear 06/14/2018      Past Surgical History:   Procedure Laterality Date    ABDOMEN SURGERY PROC UNLISTED      bilateral hernia repair    HX COLONOSCOPY       Current Outpatient Medications   Medication Sig Dispense Refill    escitalopram oxalate (LEXAPRO) 10 mg tablet Take 1 Tab by mouth daily. 90 Tab 3    rosuvastatin (CRESTOR) 10 mg tablet Take 1 Tab by mouth nightly. 30 Tab 11    Olmesartan-amLODIPine-HCTZ (TRIBENZOR) 40-5-25 mg tab Take 1 Each by mouth daily. 90 Tab 3    cyclobenzaprine (FLEXERIL) 10 mg tablet Take 1 Tab by mouth three (3) times daily as needed for Muscle Spasm(s). 180 Tab 4    lansoprazole (PREVACID) 30 mg capsule Take 1 Cap by mouth Daily (before breakfast). 30 Cap 11    brimonidine-timolol (COMBIGAN) 0.2-0.5 % drop ophthalmic solution 1 drop every twelve (12) hours.  latanoprost (XALATAN) 0.005 % ophthalmic solution Administer 1 drop to both eyes nightly.        Allergies   Allergen Reactions    Sulfa (Sulfonamide Antibiotics) Hives     Family History   Problem Relation Age of Onset    Heart Disease Father      Social History     Tobacco Use    Smoking status: Former Smoker    Smokeless tobacco: Never Used   Substance Use Topics    Alcohol use: Yes     Comment: occasional     Patient Active Problem List   Diagnosis Code    Hypertension I10    Arthritis M19.90    LBP (low back pain) O09.4    Diastolic dysfunction V97.3    Aortic insufficiency I35.1    Mild aortic insufficiency I35.1    Tinnitus of right ear H93.11    Dyslipidemia E78.5       Depression Risk Factor Screening:     PHQ over the last two weeks 12/18/2018   PHQ Not Done -   Little interest or pleasure in doing things Not at all   Feeling down, depressed, irritable, or hopeless Not at all   Total Score PHQ 2 0     Alcohol Risk Factor Screening: You do not drink alcohol or very rarely. Functional Ability and Level of Safety:   Hearing Loss  Hearing is good. Activities of Daily Living  The home contains: no safety equipment. Patient does total self care    Fall Risk  Fall Risk Assessment, last 12 mths 12/18/2018   Able to walk? Yes   Fall in past 12 months?  No       Abuse Screen  Patient is not abused    Cognitive Screening   Evaluation of Cognitive Function:  Has your family/caregiver stated any concerns about your memory: no  Normal    Patient Care Team   Patient Care Team:  Hugh Flores MD as PCP - General (Internal Medicine)    Assessment/Plan   Education and counseling provided:  Are appropriate based on today's review and evaluation        Health Maintenance Due   Topic Date Due    COLONOSCOPY  08/12/2015    MEDICARE YEARLY EXAM  09/27/2018

## 2018-12-18 NOTE — PATIENT INSTRUCTIONS
Medicare Wellness Visit, Male    The best way to live healthy is to have a lifestyle where you eat a well-balanced diet, exercise regularly, limit alcohol use, and quit all forms of tobacco/nicotine, if applicable. Regular preventive services are another way to keep healthy. Preventive services (vaccines, screening tests, monitoring & exams) can help personalize your care plan, which helps you manage your own care. Screening tests can find health problems at the earliest stages, when they are easiest to treat. [facility] follows the current, evidence-based guidelines published by the [facility] [place] ([facility] [[facility]]STF) when recommending preventive services for our patients. Because we follow these guidelines, sometimes recommendations change over time as research supports it. (For example, a prostate screening blood test is no longer routinely recommended for men with no symptoms.)  Of course, you and your doctor may decide to screen more often for some diseases, based on your risk and co-morbidities (chronic disease you are already diagnosed with). Preventive services for you include:  - Medicare offers their members a free annual wellness visit, which is time for you and your primary care provider to discuss and plan for your preventive service needs. Take advantage of this benefit every year!  -All adults over age 72 should receive the recommended pneumonia vaccines. Current USPSTF guidelines recommend a series of two vaccines for the best pneumonia protection.   -All adults should have a flu vaccine yearly and an ECG.  All adults age 61 and older should receive a shingles vaccine once in their lifetime.    -All adults age 38-68 who are overweight should have a diabetes screening test once every three years.   -Other screening tests & preventive services for persons with diabetes include: an eye exam to screen for diabetic retinopathy, a kidney function test, a foot exam, and stricter control over your cholesterol.   -Cardiovascular screening for adults with routine risk involves an electrocardiogram (ECG) at intervals determined by the provider.   -Colorectal cancer screening should be done for adults age 54-65 with no increased risk factors for colorectal cancer. There are a number of acceptable methods of screening for this type of cancer. Each test has its own benefits and drawbacks. Discuss with your provider what is most appropriate for you during your annual wellness visit. The different tests include: colonoscopy (considered the best screening method), a fecal occult blood test, a fecal DNA test, and sigmoidoscopy.  -All adults born between [facility] should be screened once for Hepatitis C.  -An Abdominal Aortic Aneurysm (AAA) Screening is recommended for men age 73-68 who has ever smoked in their lifetime.      Here is a list of your current Health Maintenance items (your personalized list of preventive services) with a due date:  Health Maintenance Due   Topic Date Due    Colonoscopy  08/12/2015    Annual Well Visit  09/27/2018

## 2018-12-18 NOTE — PROGRESS NOTES
SPORTS MEDICINE AND PRIMARY CARE  Sena Gaxiola MD, [address] [id number] [facility] system,[address] [address]  Phone:  [phone]  Fax: [phone]      Chief Complaint   Patient presents with    Annual Wellness Visit         SUBECTIVE:    Ricardo Salgado is a 80 y.o. male Patient returns today with known history of primary hypertension, dyslipidemia, moderate aortic insufficiency, followed by Doug Nuno MD, diastolic dysfunction, chronic low back pain, and is seen for evaluation. Patient returns today complaining of his sinuses acting up. He has epistaxis, as well as mucopurulent drainage, and is seen for evaluation. Also his prescription company mentioned that we did not respond to the request for Lexapro. We advised him that we already have, but will send another prescription over today. Current Outpatient Medications   Medication Sig Dispense Refill    escitalopram oxalate (LEXAPRO) 10 mg tablet Take 1 Tab by mouth daily. 90 Tab 3    rosuvastatin (CRESTOR) 10 mg tablet Take 1 Tab by mouth nightly. 30 Tab 11    Olmesartan-amLODIPine-HCTZ (TRIBENZOR) 40-5-25 mg tab Take 1 Each by mouth daily. 90 Tab 3    cyclobenzaprine (FLEXERIL) 10 mg tablet Take 1 Tab by mouth three (3) times daily as needed for Muscle Spasm(s). 180 Tab 4    lansoprazole (PREVACID) 30 mg capsule Take 1 Cap by mouth Daily (before breakfast). 30 Cap 11    brimonidine-timolol (COMBIGAN) 0.2-0.5 % drop ophthalmic solution 1 drop every twelve (12) hours.  latanoprost (XALATAN) 0.005 % ophthalmic solution Administer 1 drop to both eyes nightly.        Past Medical History:   Diagnosis Date    Aortic insufficiency 08/30/2015    moderate - leno dangelo md    Arthritis     DDD (degenerative disc disease)     Diastolic dysfunction     Dyslipidemia     Glaucoma     Hypertension     LBP (low back pain)     Mild aortic insufficiency 03/18/2016    S/P colonoscopy 4-13-15    hemorrhoids    Tinnitus of right ear 06/14/2018 Past Surgical History:   Procedure Laterality Date    ABDOMEN SURGERY PROC UNLISTED      bilateral hernia repair    HX COLONOSCOPY       Allergies   Allergen Reactions    Sulfa (Sulfonamide Antibiotics) Hives       REVIEW OF SYSTEMS:   No chest pain or shortness of breath. Social History     Socioeconomic History    Marital status:      Spouse name: Not on file    Number of children: Not on file    Years of education: Not on file    Highest education level: Not on file   Tobacco Use    Smoking status: Former Smoker    Smokeless tobacco: Never Used   Substance and Sexual Activity    Alcohol use: Yes     Comment: occasional    Drug use: No    Sexual activity: Not Currently   Social History Narrative    Medical History: Primary hypertensionDepressive disordernormal head CT May 15, 2007Mild aortic regurgitationDiastolic dysfunctionnormal left    ventricular systolic function ejection fraction 50% echocardiogram 2011negative stress w all motion study 2001 ejection    [facility] abdomen and pelvis degenerative changes lumbar spine no acute process    Surgical History: hernia repair 2012colonoscopy 2005[facility] 2014    Hospitalization/Major Diagnostic Procedure: Denies Past Hospitalization    Family History: Mother: alive, hypertension,Father:  79 yrs, chfSister(s): aliveBrother(s): aliveDaughter(s): aliveSon(s):    aliveChildren: aliveAdopted: deceased2 brother(s) , 2 sister(s) . 1 son(s) , 1 daughter(s) . Social History: Alcohol Use Patient does not use alcohol. Smoking Status Patient is a never smoker. Exercise: running. Marital Status:    . Lives w ith: spouse - recently had bunion surgery - eric. 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  Education/School: high school.   r  Family History   Problem Relation Age of Onset    Heart Disease Father        OBJECTIVE:  Visit Vitals  BP (!) 159/96   Pulse 60   Temp 97.8 °F (36.6 °C) (Oral)   Resp 16   Ht 5' 8\" (1.727 m)   Wt 190 lb 9.6 oz (86.5 kg)   SpO2 97%   BMI 28.98 kg/m²     ENT: perrla,  eom intact  NECK: supple. Thyroid normal  CHEST: clear to ascultation and percussion   HEART: regular rate and rhythm  ABD: soft, bowel sounds active  EXTREMITIES: no edema, pulse 1+     No visits with results within 3 Month(s) from this visit.    Latest known visit with results is:   Office Visit on 06/14/2018   Component Date Value Ref Range Status    Color 06/14/2018 YELLOW  YELLOW Final    Appearance 06/14/2018 CLEAR  CLEAR Final    Specific gravity 06/14/2018 1.010  1.001 - 1.035 Final    pH (UA) 06/14/2018 6.0  5.0 - 8.0 Final    Glucose 06/14/2018 NEGATIVE  NEGATIVE Final    REDUCING SUBSTANCES 06/14/2018 CANCELED   Final    Result canceled by the ancillary    Bilirubin 06/14/2018 NEGATIVE  NEGATIVE Final    Ketone 06/14/2018 NEGATIVE  NEGATIVE Final    Blood 06/14/2018 NEGATIVE  NEGATIVE Final    Protein 06/14/2018 NEGATIVE  NEGATIVE Final    Nitrites 06/14/2018 NEGATIVE  NEGATIVE Final    Leukocyte Esterase 06/14/2018 NEGATIVE  NEGATIVE Final    WBC 06/14/2018 CANCELED   Final    Result canceled by the ancillary    RBC 06/14/2018 CANCELED   Final    Result canceled by the ancillary    SQUAMOUS EPITHELIAL CELLS 06/14/2018 CANCELED   Final    Result canceled by the ancillary    Transitional epithelial cells 06/14/2018 CANCELED   Final    Result canceled by the ancillary    Renal epithelial cells 06/14/2018 CANCELED   Final    Result canceled by the ancillary    Bacteria 06/14/2018 CANCELED   Final    Result canceled by the ancillary    CA Oxalate crystals 06/14/2018 CANCELED   Final    Result canceled by the ancillary    Triple Phosphate crystals 06/14/2018 CANCELED   Final    Result canceled by the ancillary    Uric acid crystals 06/14/2018 CANCELED   Final    Result canceled by the ancillary    Amorphous sediment 06/14/2018 CANCELED   Final    Result canceled by the ancillary    Crystals, urine 06/14/2018 CANCELED   Final    Result canceled by the ancillary    Hyaline cast 06/14/2018 CANCELED   Final    Result canceled by the ancillary    Granular cast 06/14/2018 CANCELED   Final    Result canceled by the ancillary    Syeda Ct casts: 06/14/2018 CANCELED   Final    Result canceled by the ancillary    Yeast, urine 06/14/2018 CANCELED   Final    Result canceled by the ancillary    COMMENT 06/14/2018 CANCELED   Final    Result canceled by the ancillary    WBC 06/14/2018 7.1  3.8 - 10.8 Thousand/uL Final    RBC 06/14/2018 5.25  4.20 - 5.80 Million/uL Final    HGB 06/14/2018 15.9  13.2 - 17.1 g/dL Final    HCT 06/14/2018 46.1  38.5 - 50.0 % Final    MCV 06/14/2018 87.8  80.0 - 100.0 fL Final    MCH 06/14/2018 30.3  27.0 - 33.0 pg Final    MCHC 06/14/2018 34.5  32.0 - 36.0 g/dL Final    RDW 06/14/2018 13.4  11.0 - 15.0 % Final    PLATELET 33/78/8247 576  140 - 400 Thousand/uL Final    MEAN PLATELET VOLUME 80/09/6964 11.8  7.5 - 12.5 fL Final    ABS. NEUTROPHILS 06/14/2018 3,827  1,500 - 7,800 cells/uL Final    ABSOLUTE BANDS 06/14/2018 CANCELED   Final    Result canceled by the ancillary    ABSOLUTE METAMYELOCYTES 06/14/2018 CANCELED   Final    Result canceled by the ancillary    ABSOLUTE MYELOCYTES 06/14/2018 CANCELED   Final    Result canceled by the ancillary    ABSOLUTE PROMYELOCYTES 06/14/2018 CANCELED   Final    Result canceled by the ancillary    ABS. LYMPHOCYTES 06/14/2018 2,229  850 - 3,900 cells/uL Final    ABS. MONOCYTES 06/14/2018 710  200 - 950 cells/uL Final    ABS. EOSINOPHILS 06/14/2018 291  15 - 500 cells/uL Final    ABS.  BASOPHILS 06/14/2018 43  0 - 200 cells/uL Final    ABSOLUTE BLASTS 06/14/2018 CANCELED   Final    Result canceled by the ancillary    ABSOLUTE NRBC 06/14/2018 CANCELED   Final    Result canceled by the ancillary    Neutrophils 06/14/2018 53.9  % Final    BAND NEUTROPHILS 06/14/2018 CANCELED   Final    Result canceled by the ancillary    METAMYELOCYTES 06/14/2018 CANCELED   Final    Result canceled by the ancillary    MYELOCYTES 06/14/2018 CANCELED   Final    Result canceled by the ancillary    PROMYELOCYTES 06/14/2018 CANCELED   Final    Result canceled by the ancillary    LYMPHOCYTES 06/14/2018 31.4  % Final    REACTIVE LYMPHS 06/14/2018 CANCELED   Final    Result canceled by the ancillary    MONOCYTES 06/14/2018 10.0  % Final    EOSINOPHILS 06/14/2018 4.1  % Final    BASOPHILS 06/14/2018 0.6  % Final    BLASTS 06/14/2018 CANCELED   Final    Result canceled by the ancillary    NRBC 06/14/2018 CANCELED   Final    Result canceled by the ancillary    COMMENT(S) 06/14/2018 CANCELED   Final    Result canceled by the ancillary    Glucose 06/14/2018 80  65 - 99 mg/dL Final    Comment:               Fasting reference interval         BUN 06/14/2018 14  7 - 25 mg/dL Final    Creatinine 06/14/2018 1.04  0.70 - 1.25 mg/dL Final    Comment: For patients >52years of age, the reference limit  for Creatinine is approximately 13% higher for people  identified as -American.  GFR est non-AA 06/14/2018 74  > OR = 60 mL/min/1.73m2 Final    GFR est AA 06/14/2018 86  > OR = 60 mL/min/1.73m2 Final    BUN/Creatinine ratio 01/18/4316 NOT APPLICABLE  6 - 22 (calc) Final    Sodium 06/14/2018 141  135 - 146 mmol/L Final    Potassium 06/14/2018 4.5  3.5 - 5.3 mmol/L Final    Chloride 06/14/2018 102  98 - 110 mmol/L Final    CO2 06/14/2018 32* 20 - 31 mmol/L Final    Calcium 06/14/2018 10.0  8.6 - 10.3 mg/dL Final    Protein, total 06/14/2018 7.4  6.1 - 8.1 g/dL Final    Albumin 06/14/2018 4.4  3.6 - 5.1 g/dL Final    Globulin 06/14/2018 3.0  1.9 - 3.7 g/dL (calc) Final    ALB/GLOBRATIO 06/14/2018 1.5  1.0 - 2.5 (calc) Final    Bilirubin, total 06/14/2018 0.7  0.2 - 1.2 mg/dL Final    Alk.  phosphatase 06/14/2018 67  40 - 115 U/L Final    AST (SGOT) 06/14/2018 17  10 - 35 U/L Final    ALT (SGPT) 06/14/2018 16  9 - 46 U/L Final    Cholesterol, total 06/14/2018 279* <200 mg/dL Final    HDL Cholesterol 06/14/2018 64  >40 mg/dL Final    Triglyceride 06/14/2018 131  <150 mg/dL Final    LDL-CHOLESTEROL 06/14/2018 188* mg/dL (calc) Final    Comment: Reference range: <100     Desirable range <100 mg/dL for primary prevention;    <70 mg/dL for patients with CHD or diabetic patients   with > or = 2 CHD risk factors. LDL-C is now calculated using the Ruslan-Saenz   calculation, which is a validated novel method providing   better accuracy than the Friedewald equation in the   estimation of LDL-C. Tala Robbins al. Brie Cancino. 2482;149(99): 6627-2806   (http://Hallspot. [employer]. com/faq/LVD596)      Cholesterol/HDL ratio 06/14/2018 4.4  <5.0 (calc) Final    Non-HDL Cholesterol 06/14/2018 215* <130 mg/dL (calc) Final    Comment: For patients with diabetes plus 1 major ASCVD risk   factor, treating to a non-HDL-C goal of <100 mg/dL   (LDL-C of <70 mg/dL) is considered a therapeutic   option.  PSA Total 06/14/2018 1.0  < OR = 4.0 ng/mL Final    Comment: The total PSA value from this assay system is   standardized against the WHO standard. The test   result will be approximately 20% lower when compared   to the equimolar-standardized total PSA ([place]bie   [place]). Comparison of serial PSA results should be   interpreted with this fact in mind. This test was performed using the Siemens   chemiluminescent method. Values obtained from   different assay methods cannot be used  interchangeably. PSA levels, regardless of  value, should not be interpreted as absolute  evidence of the presence or absence of disease.  Hemoglobin A1c 06/14/2018 4.9  <5.7 % of total Hgb Final    Comment:  For the purpose of screening for the presence of  diabetes:     <5.7%       Consistent with the absence of diabetes  5.7-6.4% Consistent with increased risk for diabetes              (prediabetes)  > or =6.5%  Consistent with diabetes     This assay result is consistent with a decreased risk  of diabetes. Currently, no consensus exists regarding use of  hemoglobin A1c for diagnosis of diabetes in children. According to American Diabetes Association (ADA)  guidelines, hemoglobin A1c <7.0% represents optimal  control in non-pregnant diabetic patients. Different  metrics may apply to specific patient populations. Standards of Medical Care in Diabetes(ADA).  eAG (mg/dL) 06/14/2018 94  (calc) Final    eAG (mmol/L) 06/14/2018 5.2  (calc) Final          ASSESSMENT:  1. Medicare annual wellness visit, subsequent    2. Screening for alcoholism    3. Screening for depression    4. Essential hypertension    5. Arthritis    6. Diastolic dysfunction    7. Aortic valve insufficiency, etiology of cardiac valve disease unspecified    8. Dyslipidemia      Patient has typical white coat hypertension, blood pressure this morning was 723 systolic. This is usually the case. Patient has a sinusitis and will treat him with a broad spectrum antibiotic, expecting resolution. We also suggest a saline nasal spray. Will give him a steroid nasal spray. Dr. Wilbert García follows him for his aortic insufficiency. He will be back to see us in six months. I have discussed the diagnosis with the patient and the intended plan as seen in the  orders above. The patient understands and agees with the plan. The patient has   received an after visit summary and questions were answered concerning  future plans  Patient labs and/or xrays were reviewed  Past records were reviewed. PLAN:  .  Orders Placed This Encounter    Depression Screen Annual    CBC WITH AUTOMATED DIFF    escitalopram oxalate (LEXAPRO) 10 mg tablet       Follow-up Disposition:  Return in about 6 months (around 6/18/2019).               ATTENTION:   This medical record was transcribed using an electronic medical records system. Although proofread, it may and can contain electronic and spelling errors. Other human spelling and other errors may be present. Corrections may be executed at a later time. Please feel free to contact us for any clarifications as needed.

## 2018-12-19 LAB
BASOPHILS # BLD AUTO: 0 X10E3/UL (ref 0–0.2)
BASOPHILS NFR BLD AUTO: 0 %
EOSINOPHIL # BLD AUTO: 0.3 X10E3/UL (ref 0–0.4)
EOSINOPHIL NFR BLD AUTO: 4 %
ERYTHROCYTE [DISTWIDTH] IN BLOOD BY AUTOMATED COUNT: 13.8 % (ref 12.3–15.4)
HCT VFR BLD AUTO: 45 % (ref 37.5–51)
HGB BLD-MCNC: 15.7 G/DL (ref 13–17.7)
IMM GRANULOCYTES # BLD: 0 X10E3/UL (ref 0–0.1)
IMM GRANULOCYTES NFR BLD: 1 %
LYMPHOCYTES # BLD AUTO: 2 X10E3/UL (ref 0.7–3.1)
LYMPHOCYTES NFR BLD AUTO: 30 %
MCH RBC QN AUTO: 30.4 PG (ref 26.6–33)
MCHC RBC AUTO-ENTMCNC: 34.9 G/DL (ref 31.5–35.7)
MCV RBC AUTO: 87 FL (ref 79–97)
MONOCYTES # BLD AUTO: 0.6 X10E3/UL (ref 0.1–0.9)
MONOCYTES NFR BLD AUTO: 9 %
NEUTROPHILS # BLD AUTO: 3.8 X10E3/UL (ref 1.4–7)
NEUTROPHILS NFR BLD AUTO: 56 %
PLATELET # BLD AUTO: 243 X10E3/UL (ref 150–379)
RBC # BLD AUTO: 5.16 X10E6/UL (ref 4.14–5.8)
WBC # BLD AUTO: 6.9 X10E3/UL (ref 3.4–10.8)

## 2019-05-20 RX ORDER — OLMESARTAN MEDOXOMIL, AMLODIPINE AND HYDROCHLOROTHIAZIDE TABLET 40/5/25 MG 40; 5; 25 MG/1; MG/1; MG/1
1 TABLET ORAL DAILY
Qty: 90 TAB | Refills: 3 | Status: SHIPPED | OUTPATIENT
Start: 2019-05-20 | End: 2020-07-12

## 2019-06-18 ENCOUNTER — OFFICE VISIT (OUTPATIENT)
Dept: INTERNAL MEDICINE CLINIC | Age: 69
End: 2019-06-18

## 2019-06-18 VITALS
HEIGHT: 68 IN | TEMPERATURE: 98 F | WEIGHT: 184.6 LBS | HEART RATE: 58 BPM | OXYGEN SATURATION: 98 % | BODY MASS INDEX: 27.98 KG/M2 | RESPIRATION RATE: 16 BRPM

## 2019-06-18 DIAGNOSIS — R35.1 NOCTURIA: ICD-10-CM

## 2019-06-18 DIAGNOSIS — I35.1 AORTIC VALVE INSUFFICIENCY, ETIOLOGY OF CARDIAC VALVE DISEASE UNSPECIFIED: ICD-10-CM

## 2019-06-18 DIAGNOSIS — G89.29 CHRONIC MIDLINE LOW BACK PAIN WITHOUT SCIATICA: ICD-10-CM

## 2019-06-18 DIAGNOSIS — M54.50 CHRONIC MIDLINE LOW BACK PAIN WITHOUT SCIATICA: ICD-10-CM

## 2019-06-18 DIAGNOSIS — E78.5 DYSLIPIDEMIA: ICD-10-CM

## 2019-06-18 DIAGNOSIS — I10 ESSENTIAL HYPERTENSION: Primary | ICD-10-CM

## 2019-06-18 DIAGNOSIS — R79.9 ABNORMAL FINDING OF BLOOD CHEMISTRY: ICD-10-CM

## 2019-06-18 DIAGNOSIS — M19.90 ARTHRITIS: ICD-10-CM

## 2019-06-18 DIAGNOSIS — I51.89 DIASTOLIC DYSFUNCTION: ICD-10-CM

## 2019-06-18 DIAGNOSIS — H93.11 TINNITUS OF RIGHT EAR: ICD-10-CM

## 2019-06-18 NOTE — PATIENT INSTRUCTIONS
Body Mass Index: Care Instructions Your Care Instructions Body mass index (BMI) can help you see if your weight is raising your risk for health problems. It uses a formula to compare how much you weigh with how tall you are. · A BMI lower than 18.5 is considered underweight. · A BMI between 18.5 and 24.9 is considered healthy. · A BMI between 25 and 29.9 is considered overweight. A BMI of 30 or higher is considered obese. If your BMI is in the normal range, it means that you have a lower risk for weight-related health problems. If your BMI is in the overweight or obese range, you may be at increased risk for weight-related health problems, such as high blood pressure, heart disease, stroke, arthritis or joint pain, and diabetes. If your BMI is in the underweight range, you may be at increased risk for health problems such as fatigue, lower protection (immunity) against illness, muscle loss, bone loss, hair loss, and hormone problems. BMI is just one measure of your risk for weight-related health problems. You may be at higher risk for health problems if you are not active, you eat an unhealthy diet, or you drink too much alcohol or use tobacco products. Follow-up care is a key part of your treatment and safety. Be sure to make and go to all appointments, and call your doctor if you are having problems. It's also a good idea to know your test results and keep a list of the medicines you take. How can you care for yourself at home? · Practice healthy eating habits. This includes eating plenty of fruits, vegetables, whole grains, lean protein, and low-fat dairy. · If your doctor recommends it, get more exercise. Walking is a good choice. Bit by bit, increase the amount you walk every day. Try for at least 30 minutes on most days of the week. · Do not smoke. Smoking can increase your risk for health problems.  If you need help quitting, talk to your doctor about stop-smoking programs and medicines. These can increase your chances of quitting for good. · Limit alcohol to 2 drinks a day for men and 1 drink a day for women. Too much alcohol can cause health problems. If you have a BMI higher than 25 · Your doctor may do other tests to check your risk for weight-related health problems. This may include measuring the distance around your waist. A waist measurement of more than 40 inches in men or 35 inches in women can increase the risk of weight-related health problems. · Talk with your doctor about steps you can take to stay healthy or improve your health. You may need to make lifestyle changes to lose weight and stay healthy, such as changing your diet and getting regular exercise. If you have a BMI lower than 18.5 · Your doctor may do other tests to check your risk for health problems. · Talk with your doctor about steps you can take to stay healthy or improve your health. You may need to make lifestyle changes to gain or maintain weight and stay healthy, such as getting more healthy foods in your diet and doing exercises to build muscle. Where can you learn more? Go to http://sesar-deyanira.info/. Enter S176 in the search box to learn more about \"Body Mass Index: Care Instructions. \" Current as of: October 13, 2016 Content Version: 11.4 © 0509-7844 [employer]. Care instructions adapted under license by [employer] (which disclaims liability or warranty for this information). If you have questions about a medical condition or this instruction, always ask your healthcare professional. [address] any warranty or liability for your use of this information.

## 2019-06-18 NOTE — PROGRESS NOTES
Chief Complaint   Patient presents with    Physical     1. Have you been to the ER, urgent care clinic since your last visit? Hospitalized since your last visit? No    2. Have you seen or consulted any other health care providers outside of the [address] [place] [address] since your last visit? Include any pap smears or colon screening.  No

## 2019-06-18 NOTE — PROGRESS NOTES
SPORTS MEDICINE AND PRIMARY CARE  Osman Babcock MD, [address] [address]  Phone:  [phone]  Fax: [phone]       Chief Complaint   Patient presents with    Physical   .      SUBJECTIVE:    Charlotte Mccoy is a 77 y.o. male Patient returns today with known history of primary hypertension, dyslipidemia, diastolic dysfunction, arthritis, AI, chronic low back pain, and tinnitus. Patient returns today saying he saw Dr. Jannie Pimentel for his AI and has an appointment to see him soon. Exercise is a way of life. He walks at least two miles, if not more, on a daily basis and is seen for evaluation. Current Outpatient Medications   Medication Sig Dispense Refill    Olmesartan-amLODIPine-HCTZ (TRIBENZOR) 40-5-25 mg tab Take 1 Each by mouth daily. 90 Tab 3    escitalopram oxalate (LEXAPRO) 10 mg tablet Take 1 Tab by mouth daily. 90 Tab 3    lansoprazole (PREVACID) 30 mg capsule Take 1 Cap by mouth Daily (before breakfast). 30 Cap 11    brimonidine-timolol (COMBIGAN) 0.2-0.5 % drop ophthalmic solution 1 drop every twelve (12) hours.  latanoprost (XALATAN) 0.005 % ophthalmic solution Administer 1 drop to both eyes nightly.        Past Medical History:   Diagnosis Date    Aortic insufficiency 08/30/2015    dangelo - leno dangelo md    Arthritis     DDD (degenerative disc disease)     Diastolic dysfunction     Dyslipidemia     Glaucoma     Hypertension     LBP (low back pain)     S/P colonoscopy 4-13-15    hemorrhoids    Tinnitus of right ear 06/14/2018     Past Surgical History:   Procedure Laterality Date    ABDOMEN SURGERY PROC UNLISTED      bilateral hernia repair    HX COLONOSCOPY       Allergies   Allergen Reactions    Crestor [Rosuvastatin] Myalgia    Lipitor [Atorvastatin] Myalgia    Pravastatin Myalgia    Sulfa (Sulfonamide Antibiotics) Hives         REVIEW OF SYSTEMS:  General: negative for - chills or fever  ENT: negative for - headaches, nasal congestion or tinnitus  Respiratory: negative for - cough, hemoptysis, shortness of breath or wheezing  Cardiovascular : negative for - chest pain, edema, palpitations or shortness of breath  Gastrointestinal: negative for - abdominal pain, blood in stools, heartburn or nausea/vomiting  Genito-Urinary: no dysuria, trouble voiding, or hematuria  Musculoskeletal: negative for - gait disturbance, joint pain, joint stiffness or joint swelling  Neurological: no TIA or stroke symptoms  Hematologic: no bruises, no bleeding, no swollen glands  Integument: no lumps, mole changes, nail changes or rash  Endocrine: no malaise/lethargy or unexpected weight changes      Social History     Socioeconomic History    Marital status:      Spouse name: Not on file    Number of children: Not on file    Years of education: Not on file    Highest education level: Not on file   Tobacco Use    Smoking status: Former Smoker    Smokeless tobacco: Never Used   Substance and Sexual Activity    Alcohol use: Yes     Comment: occasional    Drug use: No    Sexual activity: Not Currently   Social History Narrative    Medical History: Primary hypertensionDepressive disordernormal head CT May 15, 2007Mild aortic regurgitationDiastolic dysfunctionnormal left    ventricular systolic function ejection fraction 50% echocardiogram 2011negative stress w all motion study 2001 ejection    fraction 48%GlaucomaDecember 2013 CT abdomen and pelvis degenerative changes lumbar spine no acute process    Surgical History: hernia repair 2012colonoscopy 2005[facility] 2014    Hospitalization/Major Diagnostic Procedure: Denies Past Hospitalization    Family History: Mother: alive, hypertension,Father:  79 yrs, chfSister(s): aliveBrother(s): aliveDaughter(s): aliveSon(s):    aliveChildren: aliveAdopted: deceased2 brother(s) , 2 sister(s) . 1 son(s) , 1 daughter(s) .     Social History: Alcohol Use Patient does not use alcohol. Smoking Status Patient is a never smoker. Exercise: running. Marital Status:    . Lives w ith: spouse - recently had bunion surgery - eric. 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 Plans to    retire August 31, 2012. Education/School: high school. Family History   Problem Relation Age of Onset    Heart Disease Father        OBJECTIVE:    Visit Vitals  Pulse (!) 58   Temp 98 °F (36.7 °C) (Oral)   Resp 16   Ht 5' 8\" (1.727 m)   Wt 184 lb 9.6 oz (83.7 kg)   SpO2 98%   BMI 28.07 kg/m²     CONSTITUTIONAL: well , well nourished, appears age appropriate  EYES: perrla, eom intact  ENMT:moist mucous membranes, pharynx clear  NECK: supple. Thyroid normal  RESPIRATORY: Chest: clear bilaterally   CARDIOVASCULAR: Heart: regular rate and rhythm  GASTROINTESTINAL: Abdomen: soft, bowel sounds active  HEMATOLOGIC: no pathological lymph nodes palpated  MUSCULOSKELETAL: Extremities: no edema, pulse 1+   INTEGUMENT: No unusual rashes or suspicious skin lesions noted. Nails appear normal.  NEUROLOGIC: non-focal exam   MENTAL STATUS: alert and oriented, appropriate affect           ASSESSMENT:  1. Essential hypertension    2. Dyslipidemia    3. Diastolic dysfunction    4. Arthritis    5. Aortic valve insufficiency, etiology of cardiac valve disease unspecified    6. Chronic midline low back pain without sciatica    7. Tinnitus of right ear    8. Nocturia     9. Abnormal finding of blood chemistry       As would be expected, his health to date is exceptional.  His BP is at goal.  His pulse is in the 50s, suggesting he is in excellent physical condition. The murmur of AI is barely audible at present. No evidence of cardiac decompensation related to diastolic dysfunction. It is time for his yearly lab review, which we will check today and send him results in the mail.   He will be back to see me in six months, sooner if he has any problems. dietary management education, guidance, and counseling  encourage exercise  monitor weight  prescribed dietary intake    An After Visit Summary was printed and given to the patient. I have discussed the diagnosis with the patient and the intended plan as seen in the  orders above. The patient understands and agees with the plan. The patient has   received an after visit summary and questions were answered concerning  future plans  Patient labs and/or xrays were reviewed  Past records were reviewed. PLAN:  .  Orders Placed This Encounter    URINALYSIS W/ RFLX MICROSCOPIC    CBC WITH AUTOMATED DIFF    METABOLIC PANEL, COMPREHENSIVE    LIPID PANEL    PROSTATE SPECIFIC AG    HEMOGLOBIN A1C WITH EAG       Follow-up and Dispositions    · Return in about 6 months (around 12/18/2019). ATTENTION:   This medical record was transcribed using an electronic medical records system. Although proofread, it may and can contain electronic and spelling errors. Other human spelling and other errors may be present. Corrections may be executed at a later time. Please feel free to contact us for any clarifications as needed. Discussed the patient's BMI with him.   The BMI follow up plan is as follows:

## 2019-06-20 LAB
ABSOLUTE BANDS, 67058: NORMAL
ABSOLUTE BLASTS: NORMAL
ABSOLUTE METAMYELOCYTES, 900360: NORMAL
ABSOLUTE MYELOCYTES: NORMAL
ABSOLUTE NRBC,ANRBC: NORMAL
ABSOLUTE PROMYELOCYTES: NORMAL
ALB/GLOBRATIO, 58C: 1.5 (CALC) (ref 1–2.5)
ALBUMIN SERPL-MCNC: 4 G/DL (ref 3.6–5.1)
ALP SERPL-CCNC: 63 U/L (ref 40–115)
ALT SERPL-CCNC: 17 U/L (ref 9–46)
AMORPHOUS SEDIMENT: NORMAL
APPEARANCE UR: CLEAR
AST SERPL W P-5'-P-CCNC: 19 U/L (ref 10–35)
BACTERIA,BACTU: NORMAL
BANDS,BANDS: NORMAL
BASOPHILS # BLD: 39 CELLS/UL (ref 0–200)
BASOPHILS NFR BLD: 0.6 %
BILIRUB SERPL-MCNC: 0.5 MG/DL (ref 0.2–1.2)
BILIRUB UR QL: NEGATIVE
BILIRUB UR QL: NEGATIVE
BLASTS,BLAST: NORMAL
BUN SERPL-MCNC: 15 MG/DL (ref 7–25)
BUN/CREATININE RATIO,BUCR: ABNORMAL (CALC) (ref 6–22)
CA OXALATE CRYSTALS,CAOXC: NORMAL
CALCIUM SERPL-MCNC: 9.6 MG/DL (ref 8.6–10.3)
CASTS,URINE,CSTS: NORMAL
CHLORIDE SERPL-SCNC: 101 MMOL/L (ref 98–110)
CHOL/HDL RATIO,CHHDX: 4.8 (CALC)
CHOLEST SERPL-MCNC: 254 MG/DL
CO2 SERPL-SCNC: 29 MMOL/L (ref 20–32)
COLOR UR: YELLOW
COMMENT(S): NORMAL
COMMENT, 35578784: NORMAL
CREAT SERPL-MCNC: 1.25 MG/DL (ref 0.7–1.25)
CRYSTALS,UCRY: NORMAL
EAG (MG/DL),9916804: 97 (CALC)
EAG (MMOL/L),9916805: 5.4 (CALC)
EOSINOPHIL # BLD: 293 CELLS/UL (ref 15–500)
EOSINOPHIL NFR BLD: 4.5 %
ERYTHROCYTE [DISTWIDTH] IN BLOOD BY AUTOMATED COUNT: 13.2 % (ref 11–15)
GLOBULIN,GLOB: 2.6 G/DL (CALC) (ref 1.9–3.7)
GLUCOSE SERPL-MCNC: 83 MG/DL (ref 65–99)
GRANULAR CAST,GRCST: NORMAL
HBA1C MFR BLD HPLC: 5 % OF TOTAL HGB
HCT VFR BLD AUTO: 44.8 % (ref 38.5–50)
HDLC SERPL-MCNC: 53 MG/DL
HGB BLD-MCNC: 15.6 G/DL (ref 13.2–17.1)
HGB UR QL STRIP: NEGATIVE
HYALINE CAST,HYCST: NORMAL
KETONES UR QL STRIP.AUTO: NEGATIVE
LDL-CHOLESTEROL: 173 MG/DL (CALC)
LEUKOCYTE ESTERASE: NEGATIVE
LYMPHOCYTES # BLD: 1788 CELLS/UL (ref 850–3900)
LYMPHOCYTES NFR BLD: 27.5 %
MCH RBC QN AUTO: 30.5 PG (ref 27–33)
MCHC RBC AUTO-ENTMCNC: 34.8 G/DL (ref 32–36)
MCV RBC AUTO: 87.7 FL (ref 80–100)
METAMYELOCYTES,METAS: NORMAL
MONOCYTES # BLD: 611 CELLS/UL (ref 200–950)
MONOCYTES NFR BLD: 9.4 %
MYELOCYTES,MYELO: NORMAL
NEUTROPHILS # BLD AUTO: 3770 CELLS/UL (ref 1500–7800)
NEUTROPHILS # BLD: 58 %
NITRITE UR QL STRIP.AUTO: NEGATIVE
NON-HDL CHOLESTEROL, 011976: 201 MG/DL (CALC)
NRBC: NORMAL
PH UR STRIP: 6.5 [PH] (ref 5–8)
PLATELET # BLD AUTO: 235 THOUSAND/UL (ref 140–400)
PMV BLD AUTO: 11.5 FL (ref 7.5–12.5)
POTASSIUM SERPL-SCNC: 4 MMOL/L (ref 3.5–5.3)
PROMYELOCYTES,PRO: NORMAL
PROT SERPL-MCNC: 6.6 G/DL (ref 6.1–8.1)
PROT UR STRIP-MCNC: NEGATIVE MG/DL
PSA TOTAL,4108: 1.1 NG/ML
RBC # BLD AUTO: 5.11 MILLION/UL (ref 4.2–5.8)
RBC #/AREA URNS HPF: NORMAL /[HPF]
REACTIVE LYMPHS: NORMAL
REDUCING SUBSTANCES: NORMAL
RENAL EPITHELIAL CELLS, 6131: NORMAL
SODIUM SERPL-SCNC: 139 MMOL/L (ref 135–146)
SP GR UR REFRACTOMETRY: 1.01 (ref 1–1.03)
SQUAMOUS EPITHELIAL CELLS: NORMAL
TRANSITIONAL EPITHELIAL CELLS, 6132: NORMAL
TRIGL SERPL-MCNC: 141 MG/DL (ref ?–150)
TRIPLE PHOS. CRYSTAL,TRIPC: NORMAL
URATE CRY URNS QL MICRO: NORMAL
WBC # BLD AUTO: 6.5 THOUSAND/UL (ref 3.8–10.8)
WBC URNS QL MICRO: NORMAL
YEAST URINE, 5174: NORMAL

## 2019-12-17 ENCOUNTER — OFFICE VISIT (OUTPATIENT)
Dept: INTERNAL MEDICINE CLINIC | Age: 69
End: 2019-12-17

## 2019-12-17 VITALS
HEART RATE: 55 BPM | SYSTOLIC BLOOD PRESSURE: 138 MMHG | WEIGHT: 181.8 LBS | RESPIRATION RATE: 16 BRPM | OXYGEN SATURATION: 99 % | TEMPERATURE: 97.6 F | HEIGHT: 68 IN | BODY MASS INDEX: 27.55 KG/M2 | DIASTOLIC BLOOD PRESSURE: 85 MMHG

## 2019-12-17 DIAGNOSIS — E78.5 DYSLIPIDEMIA: ICD-10-CM

## 2019-12-17 DIAGNOSIS — M19.90 ARTHRITIS: ICD-10-CM

## 2019-12-17 DIAGNOSIS — I35.1 AORTIC VALVE INSUFFICIENCY, ETIOLOGY OF CARDIAC VALVE DISEASE UNSPECIFIED: ICD-10-CM

## 2019-12-17 DIAGNOSIS — I51.89 DIASTOLIC DYSFUNCTION: ICD-10-CM

## 2019-12-17 DIAGNOSIS — I10 ESSENTIAL HYPERTENSION: Primary | ICD-10-CM

## 2019-12-17 RX ORDER — TRIAMCINOLONE ACETONIDE 1 MG/G
CREAM TOPICAL 2 TIMES DAILY
Qty: 45 G | Refills: 11 | Status: SHIPPED | OUTPATIENT
Start: 2019-12-17 | End: 2021-06-22 | Stop reason: ALTCHOICE

## 2019-12-17 NOTE — PATIENT INSTRUCTIONS
Body Mass Index: Care Instructions Your Care Instructions Body mass index (BMI) can help you see if your weight is raising your risk for health problems. It uses a formula to compare how much you weigh with how tall you are. · A BMI lower than 18.5 is considered underweight. · A BMI between 18.5 and 24.9 is considered healthy. · A BMI between 25 and 29.9 is considered overweight. A BMI of 30 or higher is considered obese. If your BMI is in the normal range, it means that you have a lower risk for weight-related health problems. If your BMI is in the overweight or obese range, you may be at increased risk for weight-related health problems, such as high blood pressure, heart disease, stroke, arthritis or joint pain, and diabetes. If your BMI is in the underweight range, you may be at increased risk for health problems such as fatigue, lower protection (immunity) against illness, muscle loss, bone loss, hair loss, and hormone problems. BMI is just one measure of your risk for weight-related health problems. You may be at higher risk for health problems if you are not active, you eat an unhealthy diet, or you drink too much alcohol or use tobacco products. Follow-up care is a key part of your treatment and safety. Be sure to make and go to all appointments, and call your doctor if you are having problems. It's also a good idea to know your test results and keep a list of the medicines you take. How can you care for yourself at home? · Practice healthy eating habits. This includes eating plenty of fruits, vegetables, whole grains, lean protein, and low-fat dairy. · If your doctor recommends it, get more exercise. Walking is a good choice. Bit by bit, increase the amount you walk every day. Try for at least 30 minutes on most days of the week. · Do not smoke. Smoking can increase your risk for health problems.  If you need help quitting, talk to your doctor about stop-smoking programs and medicines. These can increase your chances of quitting for good. · Limit alcohol to 2 drinks a day for men and 1 drink a day for women. Too much alcohol can cause health problems. If you have a BMI higher than 25 · Your doctor may do other tests to check your risk for weight-related health problems. This may include measuring the distance around your waist. A waist measurement of more than 40 inches in men or 35 inches in women can increase the risk of weight-related health problems. · Talk with your doctor about steps you can take to stay healthy or improve your health. You may need to make lifestyle changes to lose weight and stay healthy, such as changing your diet and getting regular exercise. If you have a BMI lower than 18.5 · Your doctor may do other tests to check your risk for health problems. · Talk with your doctor about steps you can take to stay healthy or improve your health. You may need to make lifestyle changes to gain or maintain weight and stay healthy, such as getting more healthy foods in your diet and doing exercises to build muscle. Where can you learn more? Go to http://sesar-deyanira.info/. Enter S176 in the search box to learn more about \"Body Mass Index: Care Instructions. \" Current as of: October 13, 2016 Content Version: 11.4 © 3653-7916 [employer]. Care instructions adapted under license by [employer] (which disclaims liability or warranty for this information). If you have questions about a medical condition or this instruction, always ask your healthcare professional. [address] any warranty or liability for your use of this information.

## 2019-12-17 NOTE — PROGRESS NOTES
Chief Complaint   Patient presents with    Hypertension    Skin Problem     1. Have you been to the ER, urgent care clinic since your last visit? Hospitalized since your last visit? No    2. Have you seen or consulted any other health care providers outside of the [address] [place] [address] since your last visit? Include any pap smears or colon screening.  No

## 2019-12-17 NOTE — PROGRESS NOTES
SPORTS MEDICINE AND PRIMARY CARE  Kalyani Coello MD, [address] [address]  Phone:  [phone]  Fax: [phone]       Chief Complaint   Patient presents with    Hypertension    Skin Problem   . SUBJECTIVE:    Lissa Robison is a 77 y.o. male Patient returns today with known history of primary hypertension, diastolic dysfunction, arthritis, aortic insufficiency, dyslipidemia, and is seen for evaluation. Patient states when he sits up, for example he sat up today, he has a pinprick sensation in his trunk. He has it at other times likewise. He wonders what is going on. Current Outpatient Medications   Medication Sig Dispense Refill    triamcinolone acetonide (KENALOG) 0.1 % topical cream Apply  to affected area two (2) times a day. 45 g 11    pitavastatin calcium (LIVALO) 1 mg tab tablet Take 1 Tab by mouth daily. 90 Tab 3    Olmesartan-amLODIPine-HCTZ (TRIBENZOR) 40-5-25 mg tab Take 1 Each by mouth daily. 90 Tab 3    escitalopram oxalate (LEXAPRO) 10 mg tablet Take 1 Tab by mouth daily. 90 Tab 3    lansoprazole (PREVACID) 30 mg capsule Take 1 Cap by mouth Daily (before breakfast). 30 Cap 11    brimonidine-timolol (COMBIGAN) 0.2-0.5 % drop ophthalmic solution 1 drop every twelve (12) hours.  latanoprost (XALATAN) 0.005 % ophthalmic solution Administer 1 drop to both eyes nightly.        Past Medical History:   Diagnosis Date    Aortic insufficiency 08/30/2015    dangelo - leno dangelo md    Arthritis     DDD (degenerative disc disease)     Diastolic dysfunction     Dyslipidemia     Glaucoma     Hypertension     LBP (low back pain)     S/P colonoscopy 4-13-15    hemorrhoids    Tinnitus of right ear 06/14/2018     Past Surgical History:   Procedure Laterality Date    ABDOMEN SURGERY PROC UNLISTED      bilateral hernia repair    HX COLONOSCOPY       Allergies   Allergen Reactions    Crestor [Rosuvastatin] Myalgia    Lipitor [Atorvastatin] Myalgia    Pravastatin Myalgia    Sulfa (Sulfonamide Antibiotics) Hives         REVIEW OF SYSTEMS:  General: negative for - chills or fever  ENT: negative for - headaches, nasal congestion or tinnitus  Respiratory: negative for - cough, hemoptysis, shortness of breath or wheezing  Cardiovascular : negative for - chest pain, edema, palpitations or shortness of breath  Gastrointestinal: negative for - abdominal pain, blood in stools, heartburn or nausea/vomiting  Genito-Urinary: no dysuria, trouble voiding, or hematuria  Musculoskeletal: negative for - gait disturbance, joint pain, joint stiffness or joint swelling  Neurological: no TIA or stroke symptoms  Hematologic: no bruises, no bleeding, no swollen glands  Integument: no lumps, mole changes, nail changes or rash  Endocrine: no malaise/lethargy or unexpected weight changes      Social History     Socioeconomic History    Marital status:      Spouse name: Not on file    Number of children: Not on file    Years of education: Not on file    Highest education level: Not on file   Tobacco Use    Smoking status: Former Smoker    Smokeless tobacco: Never Used   Substance and Sexual Activity    Alcohol use: Yes     Comment: occasional    Drug use: No    Sexual activity: Not Currently   Social History Narrative    Medical History: Primary hypertensionDepressive disordernormal head CT May 15, 2007Mild aortic regurgitationDiastolic dysfunctionnormal left    ventricular systolic function ejection fraction 50% echocardiogram 2011negative stress w all motion study 2001 ejection    fraction 48%GlaucomaDecember 2013 CT abdomen and pelvis degenerative changes lumbar spine no acute process    Surgical History: hernia repair 2012colonoscopy 2005[facility] 2014    Hospitalization/Major Diagnostic Procedure: Denies Past Hospitalization    Family History:  Mother: alive, hypertension,Father:  79 yrs, chfSister(s): aliveBrother(s): aliveDaughter(s): aliveSon(s):    aliveChildren: aliveAdopted: deceased2 brother(s) , 2 sister(s) . 1 son(s) , 1 daughter(s) . Social History: Alcohol Use Patient does not use alcohol. Smoking Status Patient is a never smoker. Exercise: running. Marital Status:    . Lives w ith: spouse - recently had bunion surgery - eric. 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 Plans to    retire August 31, 2012. Education/School: high school. Family History   Problem Relation Age of Onset    Heart Disease Father        OBJECTIVE:    Visit Vitals  /85   Pulse (!) 55   Temp 97.6 °F (36.4 °C) (Oral)   Resp 16   Ht 5' 8\" (1.727 m)   Wt 181 lb 12.8 oz (82.5 kg)   SpO2 99%   BMI 27.64 kg/m²     CONSTITUTIONAL: well , well nourished, appears age appropriate  EYES: perrla, eom intact  ENMT:moist mucous membranes, pharynx clear  NECK: supple. Thyroid normal  RESPIRATORY: Chest: clear bilaterally   CARDIOVASCULAR: Heart: regular rate and rhythm  GASTROINTESTINAL: Abdomen: soft, bowel sounds active  HEMATOLOGIC: no pathological lymph nodes palpated  MUSCULOSKELETAL: Extremities: no edema, pulse 1+   INTEGUMENT: No unusual rashes or suspicious skin lesions noted. Nails appear normal.  NEUROLOGIC: non-focal exam   MENTAL STATUS: alert and oriented, appropriate affect           ASSESSMENT:  1. Essential hypertension    2. Diastolic dysfunction    3. Arthritis    4. Aortic valve insufficiency, etiology of cardiac valve disease unspecified    5. Dyslipidemia      BP control is adequate. He has a known history of diastolic dysfunction that is compensated currently. Known history of arthritis, which is also quiescent at this time. Aortic insufficiency was followed by Dr. Cheryle Lope, who is retiring, and he has a new cardiologist and is recommended to see him on a yearly basis. We have a discussion regarding dyslipidemia.   Will try Livalo and send to his local pharmacy to see if they will pay for it. As far as his sensation associated with movement, I am not sure of the etiology, but will give Triamcinolone cream for a lack of another medication to use. He will try it. He will return in six months, sooner if he has any problems. Discussed the patient's BMI with him. The BMI follow up plan is as follows:     dietary management education, guidance, and counseling  encourage exercise  monitor weight  prescribed dietary intake    An After Visit Summary was printed and given to the patient. I have discussed the diagnosis with the patient and the intended plan as seen in the  orders above. The patient understands and agees with the plan. The patient has   received an after visit summary and questions were answered concerning  future plans  Patient labs and/or xrays were reviewed  Past records were reviewed. PLAN:  .  Orders Placed This Encounter    CBC WITH AUTOMATED DIFF    URINALYSIS W/ RFLX MICROSCOPIC    LIPID PANEL    triamcinolone acetonide (KENALOG) 0.1 % topical cream    pitavastatin calcium (LIVALO) 1 mg tab tablet       Follow-up and Dispositions    · Return in about 6 months (around 6/17/2020). ATTENTION:   This medical record was transcribed using an electronic medical records system. Although proofread, it may and can contain electronic and spelling errors. Other human spelling and other errors may be present. Corrections may be executed at a later time. Please feel free to contact us for any clarifications as needed.

## 2019-12-18 LAB
APPEARANCE UR: CLEAR
BASOPHILS # BLD AUTO: 0 X10E3/UL (ref 0–0.2)
BASOPHILS NFR BLD AUTO: 1 %
BILIRUB UR QL STRIP: NEGATIVE
CHOLEST SERPL-MCNC: 247 MG/DL (ref 100–199)
COLOR UR: YELLOW
EOSINOPHIL # BLD AUTO: 0.3 X10E3/UL (ref 0–0.4)
EOSINOPHIL NFR BLD AUTO: 4 %
ERYTHROCYTE [DISTWIDTH] IN BLOOD BY AUTOMATED COUNT: 13.4 % (ref 12.3–15.4)
GLUCOSE UR QL: NEGATIVE
HCT VFR BLD AUTO: 44.3 % (ref 37.5–51)
HDLC SERPL-MCNC: 56 MG/DL
HGB BLD-MCNC: 15.6 G/DL (ref 13–17.7)
HGB UR QL STRIP: NEGATIVE
IMM GRANULOCYTES # BLD AUTO: 0 X10E3/UL (ref 0–0.1)
IMM GRANULOCYTES NFR BLD AUTO: 0 %
KETONES UR QL STRIP: NEGATIVE
LDLC SERPL CALC-MCNC: 167 MG/DL (ref 0–99)
LEUKOCYTE ESTERASE UR QL STRIP: NEGATIVE
LYMPHOCYTES # BLD AUTO: 1.7 X10E3/UL (ref 0.7–3.1)
LYMPHOCYTES NFR BLD AUTO: 26 %
MCH RBC QN AUTO: 30.5 PG (ref 26.6–33)
MCHC RBC AUTO-ENTMCNC: 35.2 G/DL (ref 31.5–35.7)
MCV RBC AUTO: 87 FL (ref 79–97)
MICRO URNS: NORMAL
MONOCYTES # BLD AUTO: 0.7 X10E3/UL (ref 0.1–0.9)
MONOCYTES NFR BLD AUTO: 11 %
NEUTROPHILS # BLD AUTO: 3.9 X10E3/UL (ref 1.4–7)
NEUTROPHILS NFR BLD AUTO: 58 %
NITRITE UR QL STRIP: NEGATIVE
PH UR STRIP: 6.5 [PH] (ref 5–7.5)
PLATELET # BLD AUTO: 244 X10E3/UL (ref 150–450)
PROT UR QL STRIP: NEGATIVE
RBC # BLD AUTO: 5.12 X10E6/UL (ref 4.14–5.8)
SP GR UR: 1.02 (ref 1–1.03)
TRIGL SERPL-MCNC: 121 MG/DL (ref 0–149)
UROBILINOGEN UR STRIP-MCNC: 1 MG/DL (ref 0.2–1)
VLDLC SERPL CALC-MCNC: 24 MG/DL (ref 5–40)
WBC # BLD AUTO: 6.7 X10E3/UL (ref 3.4–10.8)

## 2019-12-28 RX ORDER — ESCITALOPRAM OXALATE 10 MG/1
TABLET ORAL
Qty: 90 TAB | Refills: 3 | Status: SHIPPED | OUTPATIENT
Start: 2019-12-28 | End: 2020-12-09

## 2020-01-05 NOTE — ADDENDUM NOTE
Addended by: Bakari Smith on: 12/18/2018 03:59 PM     Modules accepted: Orders
Unknown if ever smoked

## 2020-07-12 RX ORDER — OLMESARTAN MEDOXOMIL, AMLODIPINE AND HYDROCHLOROTHIAZIDE TABLET 40/5/25 MG 40; 5; 25 MG/1; MG/1; MG/1
TABLET ORAL
Qty: 90 TAB | Refills: 3 | Status: SHIPPED | OUTPATIENT
Start: 2020-07-12 | End: 2021-06-22 | Stop reason: ALTCHOICE

## 2020-07-31 ENCOUNTER — OFFICE VISIT (OUTPATIENT)
Dept: INTERNAL MEDICINE CLINIC | Age: 70
End: 2020-07-31

## 2020-07-31 VITALS
WEIGHT: 168.2 LBS | BODY MASS INDEX: 25.49 KG/M2 | DIASTOLIC BLOOD PRESSURE: 92 MMHG | HEIGHT: 68 IN | SYSTOLIC BLOOD PRESSURE: 161 MMHG | RESPIRATION RATE: 16 BRPM | OXYGEN SATURATION: 98 % | TEMPERATURE: 98.5 F | HEART RATE: 50 BPM

## 2020-07-31 DIAGNOSIS — I35.1 AORTIC VALVE INSUFFICIENCY, ETIOLOGY OF CARDIAC VALVE DISEASE UNSPECIFIED: ICD-10-CM

## 2020-07-31 DIAGNOSIS — Z13.31 SCREENING FOR DEPRESSION: ICD-10-CM

## 2020-07-31 DIAGNOSIS — R79.9 ABNORMAL FINDING OF BLOOD CHEMISTRY, UNSPECIFIED: ICD-10-CM

## 2020-07-31 DIAGNOSIS — Z00.00 MEDICARE ANNUAL WELLNESS VISIT, SUBSEQUENT: Primary | ICD-10-CM

## 2020-07-31 DIAGNOSIS — Z13.39 SCREENING FOR ALCOHOLISM: ICD-10-CM

## 2020-07-31 DIAGNOSIS — R35.1 NOCTURIA: ICD-10-CM

## 2020-07-31 DIAGNOSIS — I10 ESSENTIAL HYPERTENSION: ICD-10-CM

## 2020-07-31 DIAGNOSIS — I51.89 DIASTOLIC DYSFUNCTION: ICD-10-CM

## 2020-07-31 DIAGNOSIS — M50.122 CERVICAL DISC DISORDER AT C5-C6 LEVEL WITH RADICULOPATHY: ICD-10-CM

## 2020-07-31 DIAGNOSIS — E78.5 DYSLIPIDEMIA: ICD-10-CM

## 2020-07-31 NOTE — PATIENT INSTRUCTIONS
Medicare Wellness Visit, Male The best way to live healthy is to have a lifestyle where you eat a well-balanced diet, exercise regularly, limit alcohol use, and quit all forms of tobacco/nicotine, if applicable. Regular preventive services are another way to keep healthy. Preventive services (vaccines, screening tests, monitoring & exams) can help personalize your care plan, which helps you manage your own care. Screening tests can find health problems at the earliest stages, when they are easiest to treat. [facility] follows the current, evidence-based guidelines published by the [facility] ([facility]) when recommending preventive services for our patients. Because we follow these guidelines, sometimes recommendations change over time as research supports it. (For example, a prostate screening blood test is no longer routinely recommended for men with no symptoms). Of course, you and your doctor may decide to screen more often for some diseases, based on your risk and co-morbidities (chronic disease you are already diagnosed with). Preventive services for you include: - Medicare offers their members a free annual wellness visit, which is time for you and your primary care provider to discuss and plan for your preventive service needs. Take advantage of this benefit every year! 
-All adults over age 72 should receive the recommended pneumonia vaccines. Current USPSTF guidelines recommend a series of two vaccines for the best pneumonia protection.  
-All adults should have a flu vaccine yearly and tetanus vaccine every 10 years. 
-All adults age 48 and older should receive the shingles vaccines (series of two vaccines).       
-All adults age 38-68 who are overweight should have a diabetes screening test once every three years.  
-Other screening tests & preventive services for persons with diabetes include: an eye exam to screen for diabetic retinopathy, a kidney function test, a foot exam, and stricter control over your cholesterol.  
-Cardiovascular screening for adults with routine risk involves an electrocardiogram (ECG) at intervals determined by the provider.  
-Colorectal cancer screening should be done for adults age 54-65 with no increased risk factors for colorectal cancer. There are a number of acceptable methods of screening for this type of cancer. Each test has its own benefits and drawbacks. Discuss with your provider what is most appropriate for you during your annual wellness visit. The different tests include: colonoscopy (considered the best screening method), a fecal occult blood test, a fecal DNA test, and sigmoidoscopy. 
-All adults born between [facility] should be screened once for Hepatitis C. 
-An Abdominal Aortic Aneurysm (AAA) Screening is recommended for men age 73-68 who has ever smoked in their lifetime. Here is a list of your current Health Maintenance items (your personalized list of preventive services) with a due date: 
Health Maintenance Due Topic Date Due  Shingles Vaccine (1 of 2) 06/01/2001  Colonoscopy  08/12/2015  Pneumococcal Vaccine (1 of 1 - PPSV23) 06/01/2016  Glaucoma Screening   02/28/2019 Call Annual Well Visit  12/19/2019

## 2020-07-31 NOTE — PROGRESS NOTES
This is the Subsequent Medicare Annual Wellness Exam, performed 12 months or more after the Initial AWV or the last Subsequent AWV    I have reviewed the patient's medical history in detail and updated the computerized patient record. History     Patient Active Problem List   Diagnosis Code    Hypertension I10    Arthritis M19.90    Low back pain K20.9    Diastolic dysfunction N15.33    Aortic insufficiency I35.1    Mild aortic insufficiency I35.1    Tinnitus of right ear H93.11    Dyslipidemia E78.5     Past Medical History:   Diagnosis Date    Aortic insufficiency 08/30/2015    dangelo - leno dangelo md    Arthritis     DDD (degenerative disc disease)     Diastolic dysfunction     Dyslipidemia     Glaucoma     Hypertension     LBP (low back pain)     S/P colonoscopy 4-13-15    hemorrhoids    Tinnitus of right ear 06/14/2018      Past Surgical History:   Procedure Laterality Date    ABDOMEN SURGERY PROC UNLISTED      bilateral hernia repair    HX COLONOSCOPY       Current Outpatient Medications   Medication Sig Dispense Refill    Olmesartan-amLODIPine-HCTZ 40-5-25 mg tab TAKE 1 TABLET DAILY 90 Tab 3    pitavastatin calcium (LIVALO) 1 mg tab tablet Take 1 Tab by mouth daily. 90 Tab 3    escitalopram oxalate (LEXAPRO) 10 mg tablet TAKE 1 TABLET DAILY 90 Tab 3    triamcinolone acetonide (KENALOG) 0.1 % topical cream Apply  to affected area two (2) times a day. 45 g 11    lansoprazole (PREVACID) 30 mg capsule Take 1 Cap by mouth Daily (before breakfast). 30 Cap 11    brimonidine-timolol (COMBIGAN) 0.2-0.5 % drop ophthalmic solution 1 drop every twelve (12) hours.  latanoprost (XALATAN) 0.005 % ophthalmic solution Administer 1 drop to both eyes nightly.        Allergies   Allergen Reactions    Crestor [Rosuvastatin] Myalgia    Lipitor [Atorvastatin] Myalgia    Pravastatin Myalgia    Sulfa (Sulfonamide Antibiotics) Hives       Family History   Problem Relation Age of Onset    Heart Disease Father      Social History     Tobacco Use    Smoking status: Former Smoker    Smokeless tobacco: Never Used   Substance Use Topics    Alcohol use: Yes     Comment: occasional       Depression Risk Factor Screening:     3 most recent PHQ Screens 7/31/2020   PHQ Not Done -   Little interest or pleasure in doing things Not at all   Feeling down, depressed, irritable, or hopeless Not at all   Total Score PHQ 2 0       Alcohol Risk Factor Screening (MALE > 65): Do you average more 1 drink per night or more than 7 drinks a week: No    In the past three months have you have had more than 4 drinks containing alcohol on one occasion: No      Functional Ability and Level of Safety:   Hearing: Hearing is good. Activities of Daily Living: The home contains: no safety equipment. Patient does total self care     Ambulation: with no difficulty     Fall Risk:  Fall Risk Assessment, last 12 mths 7/31/2020   Able to walk? Yes   Fall in past 12 months? No     Abuse Screen:  Patient is not abused       Cognitive Screening   Has your family/caregiver stated any concerns about your memory: no     Cognitive Screening: not necessary    Patient Care Team   Patient Care Team:  Yodit Dickinson MD as PCP - General (Internal Medicine)  Yodit Dickinson MD as PCP - REHABILITATION [facility] Provider    Assessment/Plan   Education and counseling provided:  Are appropriate based on today's review and evaluation    Diagnoses and all orders for this visit:    1. Essential hypertension  -     URINALYSIS W/ RFLX MICROSCOPIC  -     CBC WITH AUTOMATED DIFF  -     METABOLIC PANEL, COMPREHENSIVE  -     LIPID PANEL  -     PSA, DIAGNOSTIC (PROSTATE SPECIFIC AG)  -     COLLECTION VENOUS BLOOD,VENIPUNCTURE  -     HEMOGLOBIN A1C WITH EAG    2. Diastolic dysfunction    3. Aortic valve insufficiency, etiology of cardiac valve disease unspecified    4. Dyslipidemia  -     LIPID PANEL    5.  Nocturia   -     PSA, DIAGNOSTIC (PROSTATE SPECIFIC AG)    6.  Abnormal finding of blood chemistry, unspecified   -     HEMOGLOBIN A1C WITH EAG        Health Maintenance Due   Topic Date Due    Shingrix Vaccine Age 49> (1 of 2) 06/01/2001    Colonoscopy  08/12/2015    Pneumococcal 65+ years (1 of 1 - PPSV23) 06/01/2016    GLAUCOMA SCREENING Q2Y  02/28/2019    Medicare Yearly Exam  12/19/2019

## 2020-07-31 NOTE — PROGRESS NOTES
SP[facility]INE [facility]  Ryan Swenson MD, [address] [address]  Phone:  [phone]  Fax: [phone]      Chief Complaint   Patient presents with    Annual Wellness Visit    Surgical Follow-up         SUBECTIVE:    Heriberto Sutton is a 72 y.o. male Patient returns today with known history of dyslipidemia, aortic insufficiency, followed by Evelin Garcia, diastolic dysfunction, primary hypertension. He developed numbness and tingling and weakness on his right side, thought he was having a stroke and went to [facility] and was subsequently referred to Andrea Nolan MD, who on July 15, 2020 did an anterior cervical disc and fusion for degenerative disc disease at C5-6, C6-7. He comes in today still being very uncomfortable on the right side. It feels like a ball is sitting on his buttock when he tries to go to sleep. He cannot lay on that side because of the severity of his discomfort. The surgeon told him it was not related to the surgery. Patient comes in for evaluation. Current Outpatient Medications   Medication Sig Dispense Refill    Olmesartan-amLODIPine-HCTZ 40-5-25 mg tab TAKE 1 TABLET DAILY 90 Tab 3    pitavastatin calcium (LIVALO) 1 mg tab tablet Take 1 Tab by mouth daily. 90 Tab 3    escitalopram oxalate (LEXAPRO) 10 mg tablet TAKE 1 TABLET DAILY 90 Tab 3    triamcinolone acetonide (KENALOG) 0.1 % topical cream Apply  to affected area two (2) times a day. 45 g 11    lansoprazole (PREVACID) 30 mg capsule Take 1 Cap by mouth Daily (before breakfast). 30 Cap 11    brimonidine-timolol (COMBIGAN) 0.2-0.5 % drop ophthalmic solution 1 drop every twelve (12) hours.  latanoprost (XALATAN) 0.005 % ophthalmic solution Administer 1 drop to both eyes nightly.        Past Medical History:   Diagnosis Date    Aortic insufficiency 08/30/2015    dangelo - leno dangelo md    Arthritis     Cervical disc disorder at C5-C6 level with radiculopathy 07/15/2020    and c6-7 -anterior cervical disc and fusion by Barbra Leyden, MD    DDD (degenerative disc disease)     Diastolic dysfunction     Dyslipidemia     Glaucoma     Hypertension     LBP (low back pain)     S/P colonoscopy 4-13-15    hemorrhoids    Tinnitus of right ear 2018     Past Surgical History:   Procedure Laterality Date    ABDOMEN SURGERY PROC UNLISTED      bilateral hernia repair    HX COLONOSCOPY       Allergies   Allergen Reactions    Crestor [Rosuvastatin] Myalgia    Lipitor [Atorvastatin] Myalgia    Pravastatin Myalgia    Sulfa (Sulfonamide Antibiotics) Hives       REVIEW OF SYSTEMS:   No chest pain, no shortness of breath. Social History     Socioeconomic History    Marital status:      Spouse name: Not on file    Number of children: Not on file    Years of education: Not on file    Highest education level: Not on file   Tobacco Use    Smoking status: Former Smoker    Smokeless tobacco: Never Used   Substance and Sexual Activity    Alcohol use: Yes     Comment: occasional    Drug use: No    Sexual activity: Not Currently   Social History Narrative    Medical History: Primary hypertensionDepressive disordernormal head CT May 15, 2007Mild aortic regurgitationDiastolic dysfunctionnormal left    ventricular systolic function ejection fraction 50% echocardiogram 2011negative stress w all motion study 2001 ejection    [facility] abdomen and pelvis degenerative changes lumbar spine no acute process    Surgical History: hernia repair 2012[facility] 2005[facility] 2014    Hospitalization/Major Diagnostic Procedure: Denies Past Hospitalization    Family History: Mother: alive, hypertension,Father:  79 yrs, chfSister(s): aliveBrother(s): aliveDaughter(s): aliveSon(s):    aliveChildren: aliveAdopted: deceased2 brother(s) , 2 sister(s) . 1 son(s) , 1 daughter(s) .     Social History: Alcohol Use Patient does not use alcohol. Smoking Status Patient is a never smoker. Exercise: running. Marital Status:    . Lives w ith: spouse - recently had bunion surgery - eric. 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 Plans to    retire August 31, 2012. Education/School: high school.   r  Family History   Problem Relation Age of Onset    Heart Disease Father        OBJECTIVE:  Visit Vitals  BP (!) 161/92   Pulse (!) 50   Temp 98.5 °F (36.9 °C) (Oral)   Resp 16   Ht 5' 8\" (1.727 m)   Wt 168 lb 3.2 oz (76.3 kg)   SpO2 98%   BMI 25.57 kg/m²     ENT: perrla,  eom intact  NECK: supple. Thyroid normal  CHEST: clear to ascultation and percussion   HEART: regular rate and rhythm  ABD: soft, bowel sounds active  EXTREMITIES: no edema, pulse 1+     No visits with results within 3 Month(s) from this visit. Latest known visit with results is:   Office Visit on 12/17/2019   Component Date Value Ref Range Status    WBC 12/17/2019 6.7  3.4 - 10.8 x10E3/uL Final    RBC 12/17/2019 5.12  4.14 - 5.80 x10E6/uL Final    HGB 12/17/2019 15.6  13.0 - 17.7 g/dL Final    HCT 12/17/2019 44.3  37.5 - 51.0 % Final    MCV 12/17/2019 87  79 - 97 fL Final    MCH 12/17/2019 30.5  26.6 - 33.0 pg Final    MCHC 12/17/2019 35.2  31.5 - 35.7 g/dL Final    RDW 12/17/2019 13.4  12.3 - 15.4 % Final    Comment: **Effective January 6, 2020, the RDW pediatric reference**    interval will be removed and the adult reference interval    will be changing to:                             Female 11.7 - 15.4                                                       Male 11.6 - 15.4      PLATELET 53/92/5908 423  150 - 450 x10E3/uL Final    NEUTROPHILS 12/17/2019 58  Not Estab. % Final    Lymphocytes 12/17/2019 26  Not Estab. % Final    MONOCYTES 12/17/2019 11  Not Estab. % Final    EOSINOPHILS 12/17/2019 4  Not Estab. % Final    BASOPHILS 12/17/2019 1  Not Estab. % Final    ABS.  NEUTROPHILS 12/17/2019 3.9  1.4 - 7.0 x10E3/uL Final    Abs Lymphocytes 12/17/2019 1.7  0.7 - 3.1 x10E3/uL Final    ABS. MONOCYTES 12/17/2019 0.7  0.1 - 0.9 x10E3/uL Final    ABS. EOSINOPHILS 12/17/2019 0.3  0.0 - 0.4 x10E3/uL Final    ABS. BASOPHILS 12/17/2019 0.0  0.0 - 0.2 x10E3/uL Final    IMMATURE GRANULOCYTES 12/17/2019 0  Not Estab. % Final    ABS. IMM. GRANS. 12/17/2019 0.0  0.0 - 0.1 x10E3/uL Final    Specific Gravity 12/17/2019 1.016  1.005 - 1.030 Final    pH (UA) 12/17/2019 6.5  5.0 - 7.5 Final    Color 12/17/2019 Yellow  Yellow Final    Appearance 12/17/2019 Clear  Clear Final    Leukocyte Esterase 12/17/2019 Negative  Negative Final    Protein 12/17/2019 Negative  Negative/Trace Final    Glucose 12/17/2019 Negative  Negative Final    Ketone 12/17/2019 Negative  Negative Final    Blood 12/17/2019 Negative  Negative Final    Bilirubin 12/17/2019 Negative  Negative Final    Urobilinogen 12/17/2019 1.0  0.2 - 1.0 mg/dL Final    Nitrites 12/17/2019 Negative  Negative Final    Microscopic Examination 12/17/2019 Comment   Final    Microscopic not indicated and not performed.  Cholesterol, total 12/17/2019 247* 100 - 199 mg/dL Final    Triglyceride 12/17/2019 121  0 - 149 mg/dL Final    HDL Cholesterol 12/17/2019 56  >39 mg/dL Final    VLDL, calculated 12/17/2019 24  5 - 40 mg/dL Final    LDL, calculated 12/17/2019 167* 0 - 99 mg/dL Final          ASSESSMENT:  1. Medicare annual wellness visit, subsequent    2. Essential hypertension    3. Diastolic dysfunction    4. Aortic valve insufficiency, etiology of cardiac valve disease unspecified    5. Dyslipidemia    6. Nocturia     7. Abnormal finding of blood chemistry, unspecified     8. Screening for alcoholism    9. Screening for depression    10. Cervical disc disorder at C5-C6 level with radiculopathy      Patient continues to have neuropathic discomfort on the right side and will ask neurology to see him and see what recommendations they have.   Hopefully over the course of time it will completely resolve. Blood pressure is up to date, but that is usual for him. At home his blood pressure is in the 120s. He has no evidence of cardiac decompensation with his known history of diastolic dysfunction. Appropriate lab studies will be checked and sent to him in the mail. He will be back to see me in about three to four months, sooner if this does not resolve. I have discussed the diagnosis with the patient and the intended plan as seen in the  orders above. The patient understands and agees with the plan. The patient has   received an after visit summary and questions were answered concerning  future plans  Patient labs and/or xrays were reviewed  Past records were reviewed. PLAN:  .  Orders Placed This Encounter    Depression Screen Annual    URINALYSIS W/ RFLX MICROSCOPIC    CBC WITH AUTOMATED DIFF    METABOLIC PANEL, COMPREHENSIVE    LIPID PANEL    PROSTATE SPECIFIC AG    HEMOGLOBIN A1C WITH EAG    [address] [facility] Neu[facility] ref [address] [facility]                     ATTENTION:   This medical record was transcribed using an electronic medical records system. Although proofread, it may and can contain electronic and spelling errors. Other human spelling and other errors may be present. Corrections may be executed at a later time. Please feel free to contact us for any clarifications as needed.

## 2020-08-01 LAB
ALBUMIN SERPL-MCNC: 4.4 G/DL (ref 3.8–4.8)
ALBUMIN/GLOB SERPL: 1.5 {RATIO} (ref 1.2–2.2)
ALP SERPL-CCNC: 89 IU/L (ref 39–117)
ALT SERPL-CCNC: 16 IU/L (ref 0–44)
APPEARANCE UR: CLEAR
AST SERPL-CCNC: 15 IU/L (ref 0–40)
BASOPHILS # BLD AUTO: 0 X10E3/UL (ref 0–0.2)
BASOPHILS NFR BLD AUTO: 1 %
BILIRUB SERPL-MCNC: 0.5 MG/DL (ref 0–1.2)
BILIRUB UR QL STRIP: NEGATIVE
BUN SERPL-MCNC: 8 MG/DL (ref 8–27)
BUN/CREAT SERPL: 8 (ref 10–24)
CALCIUM SERPL-MCNC: 10.3 MG/DL (ref 8.6–10.2)
CHLORIDE SERPL-SCNC: 97 MMOL/L (ref 96–106)
CHOLEST SERPL-MCNC: 273 MG/DL (ref 100–199)
CO2 SERPL-SCNC: 27 MMOL/L (ref 20–29)
COLOR UR: YELLOW
CREAT SERPL-MCNC: 1.02 MG/DL (ref 0.76–1.27)
EOSINOPHIL # BLD AUTO: 0.1 X10E3/UL (ref 0–0.4)
EOSINOPHIL NFR BLD AUTO: 1 %
ERYTHROCYTE [DISTWIDTH] IN BLOOD BY AUTOMATED COUNT: 13 % (ref 11.6–15.4)
EST. AVERAGE GLUCOSE BLD GHB EST-MCNC: 100 MG/DL
GLOBULIN SER CALC-MCNC: 2.9 G/DL (ref 1.5–4.5)
GLUCOSE SERPL-MCNC: 80 MG/DL (ref 65–99)
GLUCOSE UR QL: NEGATIVE
HBA1C MFR BLD: 5.1 % (ref 4.8–5.6)
HCT VFR BLD AUTO: 44.4 % (ref 37.5–51)
HDLC SERPL-MCNC: 57 MG/DL
HGB BLD-MCNC: 15.6 G/DL (ref 13–17.7)
HGB UR QL STRIP: NEGATIVE
IMM GRANULOCYTES # BLD AUTO: 0 X10E3/UL (ref 0–0.1)
IMM GRANULOCYTES NFR BLD AUTO: 1 %
KETONES UR QL STRIP: NEGATIVE
LDLC SERPL CALC-MCNC: 195 MG/DL (ref 0–99)
LEUKOCYTE ESTERASE UR QL STRIP: NEGATIVE
LYMPHOCYTES # BLD AUTO: 1.7 X10E3/UL (ref 0.7–3.1)
LYMPHOCYTES NFR BLD AUTO: 24 %
MCH RBC QN AUTO: 30.6 PG (ref 26.6–33)
MCHC RBC AUTO-ENTMCNC: 35.1 G/DL (ref 31.5–35.7)
MCV RBC AUTO: 87 FL (ref 79–97)
MICRO URNS: NORMAL
MONOCYTES # BLD AUTO: 0.7 X10E3/UL (ref 0.1–0.9)
MONOCYTES NFR BLD AUTO: 9 %
NEUTROPHILS # BLD AUTO: 4.4 X10E3/UL (ref 1.4–7)
NEUTROPHILS NFR BLD AUTO: 64 %
NITRITE UR QL STRIP: NEGATIVE
PH UR STRIP: 5.5 [PH] (ref 5–7.5)
PLATELET # BLD AUTO: 271 X10E3/UL (ref 150–450)
POTASSIUM SERPL-SCNC: 3.6 MMOL/L (ref 3.5–5.2)
PROT SERPL-MCNC: 7.3 G/DL (ref 6–8.5)
PROT UR QL STRIP: NEGATIVE
PSA SERPL-MCNC: 1.5 NG/ML (ref 0–4)
RBC # BLD AUTO: 5.09 X10E6/UL (ref 4.14–5.8)
SODIUM SERPL-SCNC: 141 MMOL/L (ref 134–144)
SP GR UR: 1.01 (ref 1–1.03)
TRIGL SERPL-MCNC: 106 MG/DL (ref 0–149)
UROBILINOGEN UR STRIP-MCNC: 1 MG/DL (ref 0.2–1)
VLDLC SERPL CALC-MCNC: 21 MG/DL (ref 5–40)
WBC # BLD AUTO: 6.9 X10E3/UL (ref 3.4–10.8)

## 2020-08-07 ENCOUNTER — OFFICE VISIT (OUTPATIENT)
Dept: NEUROLOGY | Facility: CLINIC | Age: 70
End: 2020-08-07
Payer: MEDICARE

## 2020-08-07 VITALS
TEMPERATURE: 97.8 F | HEIGHT: 68 IN | BODY MASS INDEX: 25.31 KG/M2 | OXYGEN SATURATION: 98 % | DIASTOLIC BLOOD PRESSURE: 82 MMHG | HEART RATE: 64 BPM | WEIGHT: 167 LBS | SYSTOLIC BLOOD PRESSURE: 128 MMHG

## 2020-08-07 DIAGNOSIS — G95.9 CERVICAL MYELOPATHY WITH CERVICAL RADICULOPATHY (HCC): Primary | ICD-10-CM

## 2020-08-07 DIAGNOSIS — M54.12 CERVICAL MYELOPATHY WITH CERVICAL RADICULOPATHY (HCC): Primary | ICD-10-CM

## 2020-08-07 DIAGNOSIS — Z98.890 H/O CERVICAL SPINE SURGERY: ICD-10-CM

## 2020-08-07 PROCEDURE — G8752 SYS BP LESS 140: HCPCS | Performed by: PSYCHIATRY & NEUROLOGY

## 2020-08-07 PROCEDURE — G8754 DIAS BP LESS 90: HCPCS | Performed by: PSYCHIATRY & NEUROLOGY

## 2020-08-07 PROCEDURE — G8427 DOCREV CUR MEDS BY ELIG CLIN: HCPCS | Performed by: PSYCHIATRY & NEUROLOGY

## 2020-08-07 PROCEDURE — G8419 CALC BMI OUT NRM PARAM NOF/U: HCPCS | Performed by: PSYCHIATRY & NEUROLOGY

## 2020-08-07 PROCEDURE — 1101F PT FALLS ASSESS-DOCD LE1/YR: CPT | Performed by: PSYCHIATRY & NEUROLOGY

## 2020-08-07 PROCEDURE — G8432 DEP SCR NOT DOC, RNG: HCPCS | Performed by: PSYCHIATRY & NEUROLOGY

## 2020-08-07 PROCEDURE — G8536 NO DOC ELDER MAL SCRN: HCPCS | Performed by: PSYCHIATRY & NEUROLOGY

## 2020-08-07 PROCEDURE — 3017F COLORECTAL CA SCREEN DOC REV: CPT | Performed by: PSYCHIATRY & NEUROLOGY

## 2020-08-07 PROCEDURE — 99204 OFFICE O/P NEW MOD 45 MIN: CPT | Performed by: PSYCHIATRY & NEUROLOGY

## 2020-08-07 RX ORDER — CHOLECALCIFEROL (VITAMIN D3) 50 MCG
1 CAPSULE ORAL
COMMUNITY

## 2020-08-07 NOTE — PROGRESS NOTES
Chief Complaint   Patient presents with    Numbness     \"on my right side that goes from my rib cage down to my feet, every thing feel tight and I cannot sleep on my right side. \"     Visit Vitals  /82 (BP 1 Location: Left arm, BP Patient Position: Sitting)   Pulse 64   Temp 97.8 °F (36.6 °C) (Oral)   Ht 5' 8\" (1.727 m)   Wt 75.8 kg (167 lb)   SpO2 98%   BMI 25.39 kg/m²

## 2020-08-07 NOTE — PROGRESS NOTES
Chief Complaint   Patient presents with    Numbness     \"on my right side that goes from my rib cage down to my feet, every thing feel tight and I cannot sleep on my right side. \"       Referred by: Dr. Ekaterina Stuart is a 36-year-old gentleman with a history of C-spine arthritis here with his wife to discuss right-sided numbness and tingling and pain. It turns out that he had the symptoms developed in May when he woke up one morning and he was seen at a local emergency and ruled out for stroke. Symptoms did not improve and continued to affect the arm and leg. He eventually saw neurosurgery and it sounds like he had possibly C-spine stenosis with or without neuroforaminal stenosis on the right. He underwent fusion less than a month ago on July 15 of C5-C7. Since that time the symptoms are little better but still persist mainly as paresthesias. No weakness. He is able to use his hand and arm and leg and foot like before which was impaired prior to surgery. He is also complaining of some rib cage pain on the right. He also complains of right hip pain. Review of Systems   Musculoskeletal: Negative for falls. Neurological: Positive for tingling and sensory change. All other systems reviewed and are negative.       Past Medical History:   Diagnosis Date    Aortic insufficiency 08/30/2015    moderate - leno dangelo md    Arthritis     Cervical disc disorder at C5-C6 level with radiculopathy 07/15/2020    and c6-7 -anterior cervical disc and fusion by Lex Encinas MD    DDD (degenerative disc disease)     Diastolic dysfunction     Dyslipidemia     Glaucoma     Hypertension     LBP (low back pain)     S/P colonoscopy 4-13-15    hemorrhoids    Tinnitus of right ear 06/14/2018     Family History   Problem Relation Age of Onset    Heart Disease Father      Social History     Socioeconomic History    Marital status:      Spouse name: Not on file    Number of children: Not on file    Years of education: Not on file    Highest education level: Not on file   Occupational History    Not on file   Social Needs    Financial resource strain: Not on file    Food insecurity     Worry: Not on file     Inability: Not on file    Transportation needs     Medical: Not on file     Non-medical: Not on file   Tobacco Use    Smoking status: Former Smoker    Smokeless tobacco: Never Used   Substance and Sexual Activity    Alcohol use: Yes     Comment: occasional    Drug use: No    Sexual activity: Not Currently   Lifestyle    Physical activity     Days per week: Not on file     Minutes per session: Not on file    Stress: Not on file   Relationships    Social connections     Talks on phone: Not on file     Gets together: Not on file     Attends Denominational service: Not on file     Active member of club or organization: Not on file     Attends meetings of clubs or organizations: Not on file     Relationship status: Not on file    Intimate partner violence     Fear of current or ex partner: Not on file     Emotionally abused: Not on file     Physically abused: Not on file     Forced sexual activity: Not on file   Other Topics Concern    Not on file   Social History Narrative    Medical History: Primary hypertensionDepressive disordernormal head CT May 15, 2007Mild aortic regurgitationDiastolic dysfunctionnormal left    ventricular systolic function ejection fraction 50% echocardiogram 2011negative stress w all motion study 2001 ejection    fraction 48%GlaucomaDecember 2013 CT abdomen and pelvis degenerative changes lumbar spine no acute process    Surgical History: [facility]nia [facility] 2012[facility]    Hospitalization/Major Diagnostic Procedure: Denies Past Hospitalization    Family History:  Mother: alive, hypertension,Father:  79 yrs, chfSister(s): aliveBrother(s): aliveDaughter(s): aliveSon(s):    aliveChildren: aliveAdopted: deceased2 brother(s) , 2 sister(s) . 1 son(s) , 1 daughter(s) . Social History: Alcohol Use Patient does not use alcohol. Smoking Status Patient is a never smoker. Exercise: running. Marital Status:    . Lives w ith: spouse - recently had bunion surgery - eric. 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 Plans to    retire August 31, 2012. Education/School: high school. Current Outpatient Medications   Medication Sig    omega 3-dha-epa-fish oil (Fish Oil) 100-160-1,000 mg cap 1 Tab.  multivit-min/FA/lycopen/lutein (CENTRUM SILVER MEN PO) Take  by mouth.  pitavastatin calcium (LIVALO) 2 mg tablet Take 1 Tab by mouth daily.  Olmesartan-amLODIPine-HCTZ 40-5-25 mg tab TAKE 1 TABLET DAILY    escitalopram oxalate (LEXAPRO) 10 mg tablet TAKE 1 TABLET DAILY    triamcinolone acetonide (KENALOG) 0.1 % topical cream Apply  to affected area two (2) times a day.  lansoprazole (PREVACID) 30 mg capsule Take 1 Cap by mouth Daily (before breakfast).  brimonidine-timolol (COMBIGAN) 0.2-0.5 % drop ophthalmic solution 1 drop every twelve (12) hours.  latanoprost (XALATAN) 0.005 % ophthalmic solution Administer 1 drop to both eyes nightly. No current facility-administered medications for this visit. Allergies   Allergen Reactions    Crestor [Rosuvastatin] Myalgia    Lipitor [Atorvastatin] Myalgia    Pravastatin Myalgia    Sulfa (Sulfonamide Antibiotics) Hives         Neurologic Exam     Mental Status   Oriented to person, place, and time. Cranial Nerves   Cranial nerves II through XII intact. Motor Exam   Muscle bulk: normal    Strength   Strength 5/5 throughout.  Right infraspinatus 4+ all else 5/5 without any atrophy     Sensory Exam   Sensory deficit distribution on right: non-dermatomal distribution    Gait, Coordination, and Reflexes     Gait  Gait: normal    Tremor   Resting tremor: absentRight upper extremity 3/4 left upper extremity 2/4, right lower extremity 3+ left lower extremity 2+ bilateral Achilles 2+     Physical Exam   Constitutional: He is oriented to person, place, and time. He appears well-developed and well-nourished. Cardiovascular: Normal rate. Pulmonary/Chest: Effort normal.   Neurological: He is oriented to person, place, and time. He has normal strength. Gait normal.   Skin: Skin is warm and dry. Psychiatric: His behavior is normal.   Vitals reviewed. Visit Vitals  /82 (BP 1 Location: Left arm, BP Patient Position: Sitting)   Pulse 64   Temp 97.8 °F (36.6 °C) (Oral)   Ht 5' 8\" (1.727 m)   Wt 75.8 kg (167 lb)   SpO2 98%   BMI 25.39 kg/m²       Lab Results   Component Value Date/Time    WBC 6.9 07/31/2020 03:39 PM    HGB 15.6 07/31/2020 03:39 PM    HCT 44.4 07/31/2020 03:39 PM    PLATELET 688 45/60/8589 03:39 PM    MCV 87 07/31/2020 03:39 PM     Lab Results   Component Value Date/Time    ALT (SGPT) 16 07/31/2020 03:39 PM    Alk. phosphatase 89 07/31/2020 03:39 PM    Bilirubin, total 0.5 07/31/2020 03:39 PM    Albumin 4.4 07/31/2020 03:39 PM    Protein, total 7.3 07/31/2020 03:39 PM    PLATELET 615 58/57/6726 03:39 PM        CT Results (maximum last 3): No results found for this or any previous visit. MRI Results (maximum last 3): No results found for this or any previous visit. PET Results (maximum last 3): No results found for this or any previous visit. Assessment and Plan   Diagnoses and all orders for this visit:    1. Cervical myelopathy with cervical radiculopathy    2. H/O cervical spine surgery      71-year-old gentleman who recently had C-spine surgery for I suspect a myelopathy radiculopathy. He actually has had some improvement since surgery but I think he needs more time for recovery. We talked about the symptoms at length and the likely process that occurred in the spine. He does have some mild weakness of the right infraspinatus but otherwise is doing very well neurologically.   It is possible he may have residual paresthesias permanently but it is too soon to know. Also he is having some rib pain on the right and right hip pain I would recommend he discuss that with Ortho or primary care. He should also discuss the residual paresthesias with neurosurgery when he sees them in follow-up. Questions answered in detail. We talked about the neuroanatomy of the spine in detail. I reviewed and decided to continue the current medications. This clinical note was dictated with an electronic dictation software that can make unintentional errors. If there are any questions, please contact me directly for clarification. A notice of this visit/encounter being completed has been sent electronically to the patient's PCP and/or referring provider.      [facility], [facility] [address]  Diplomate AMBERLYN

## 2020-08-19 ENCOUNTER — TELEPHONE (OUTPATIENT)
Dept: INTERNAL MEDICINE CLINIC | Age: 70
End: 2020-08-19

## 2020-08-19 NOTE — TELEPHONE ENCOUNTER
Patient stated that the Pitavastatin has a high out-of-pocket cost and wish to change to an alternative. Please advise!

## 2020-08-20 RX ORDER — EZETIMIBE 10 MG/1
10 TABLET ORAL DAILY
Qty: 30 TAB | Refills: 11 | Status: SHIPPED | OUTPATIENT
Start: 2020-08-20 | End: 2021-07-31

## 2020-08-20 NOTE — TELEPHONE ENCOUNTER
New order Zetia 10 mg per Dr. Lizzette Conklin to replace Pitavastatin . Pt advised that Zetia is not as effective as Livalo in reducing cholesterol leper Dr. Lizzette Conklin. Pt verbalize understanding.

## 2020-08-20 NOTE — TELEPHONE ENCOUNTER
New order Zetia 10 mg per Dr. Michael Hassan to replace Pitavastatin . Pt advised that Zetia is not as effective as Livalo in reducing cholesterol leper Dr. Michael Hassan. Pt verbalize understanding.

## 2020-09-29 ENCOUNTER — OFFICE VISIT (OUTPATIENT)
Dept: INTERNAL MEDICINE CLINIC | Age: 70
End: 2020-09-29
Payer: MEDICARE

## 2020-09-29 VITALS
DIASTOLIC BLOOD PRESSURE: 80 MMHG | TEMPERATURE: 98.1 F | SYSTOLIC BLOOD PRESSURE: 130 MMHG | RESPIRATION RATE: 18 BRPM | WEIGHT: 171.89 LBS | BODY MASS INDEX: 26.05 KG/M2 | HEIGHT: 68 IN | HEART RATE: 72 BPM | OXYGEN SATURATION: 97 %

## 2020-09-29 DIAGNOSIS — I10 ESSENTIAL HYPERTENSION: ICD-10-CM

## 2020-09-29 DIAGNOSIS — E78.5 DYSLIPIDEMIA: ICD-10-CM

## 2020-09-29 DIAGNOSIS — M19.90 ARTHRITIS: ICD-10-CM

## 2020-09-29 DIAGNOSIS — M50.122 CERVICAL DISC DISORDER AT C5-C6 LEVEL WITH RADICULOPATHY: ICD-10-CM

## 2020-09-29 DIAGNOSIS — I51.89 DIASTOLIC DYSFUNCTION: ICD-10-CM

## 2020-09-29 DIAGNOSIS — M54.50 CHRONIC MIDLINE LOW BACK PAIN WITHOUT SCIATICA: ICD-10-CM

## 2020-09-29 DIAGNOSIS — I35.1 AORTIC VALVE INSUFFICIENCY, ETIOLOGY OF CARDIAC VALVE DISEASE UNSPECIFIED: ICD-10-CM

## 2020-09-29 DIAGNOSIS — Z23 ENCOUNTER FOR IMMUNIZATION: Primary | ICD-10-CM

## 2020-09-29 DIAGNOSIS — G89.29 CHRONIC MIDLINE LOW BACK PAIN WITHOUT SCIATICA: ICD-10-CM

## 2020-09-29 PROCEDURE — G8754 DIAS BP LESS 90: HCPCS

## 2020-09-29 PROCEDURE — G8427 DOCREV CUR MEDS BY ELIG CLIN: HCPCS

## 2020-09-29 PROCEDURE — 99214 OFFICE O/P EST MOD 30 MIN: CPT

## 2020-09-29 PROCEDURE — G8419 CALC BMI OUT NRM PARAM NOF/U: HCPCS

## 2020-09-29 PROCEDURE — 90653 IIV ADJUVANT VACCINE IM: CPT

## 2020-09-29 PROCEDURE — G8752 SYS BP LESS 140: HCPCS

## 2020-09-29 PROCEDURE — G0008 ADMIN INFLUENZA VIRUS VAC: HCPCS

## 2020-09-29 PROCEDURE — G8536 NO DOC ELDER MAL SCRN: HCPCS

## 2020-09-29 PROCEDURE — G8510 SCR DEP NEG, NO PLAN REQD: HCPCS

## 2020-09-29 PROCEDURE — 1101F PT FALLS ASSESS-DOCD LE1/YR: CPT

## 2020-09-29 PROCEDURE — 3017F COLORECTAL CA SCREEN DOC REV: CPT

## 2020-09-29 NOTE — PROGRESS NOTES
1. Have you been to the ER, urgent care clinic since your last visit? Hospitalized since your last visit? No    2. Have you seen or consulted any other health care providers outside of the [address] [place] [address] since your last visit? Include any pap smears or colon screening.  No     Wants to discuss right side pain

## 2020-09-29 NOTE — PROGRESS NOTES
SPORTS MEDICINE AND PRIMARY CARE  Librado Camarena MD, [address] [address]  Phone:  [phone]  Fax: [phone]       Chief Complaint   Patient presents with    Side Pain     right side   . SUBJECTIVE:    Arnulfo Gimenez is a 72 y.o. male Patient returns today with a known history of primary hypertension, aortic insufficiency, low back pain, diastolic dysfunction, arthritis, dyslipidemia with statin intolerance, cervical disc disease with radiculopathy, and is seen for evaluation. Patient complains of chest discomfort posteriorly. On the 23rd of May he woke up and he had numbness in his chest posteriorly and the entire right side of arms and legs, and subsequently had the surgery by Dr. Meghna Reyes on July 15th, but the discomfort continues. He had a chest CT at [facility] that was apparently negative. He wants to know what is going on with the chest and the discomfort he continues to have and is seen for evaluation. Current Outpatient Medications   Medication Sig Dispense Refill    ezetimibe (ZETIA) 10 mg tablet Take 1 Tab by mouth daily. 30 Tab 11    omega 3-dha-epa-fish oil (Fish Oil) 100-160-1,000 mg cap 1 Tab.  multivit-min/FA/lycopen/lutein (CENTRUM SILVER MEN PO) Take  by mouth.  pitavastatin calcium (LIVALO) 2 mg tablet Take 1 Tab by mouth daily. 30 Tab 11    Olmesartan-amLODIPine-HCTZ 40-5-25 mg tab TAKE 1 TABLET DAILY 90 Tab 3    triamcinolone acetonide (KENALOG) 0.1 % topical cream Apply  to affected area two (2) times a day. 45 g 11    lansoprazole (PREVACID) 30 mg capsule Take 1 Cap by mouth Daily (before breakfast). 30 Cap 11    brimonidine-timolol (COMBIGAN) 0.2-0.5 % drop ophthalmic solution 1 drop every twelve (12) hours.  latanoprost (XALATAN) 0.005 % ophthalmic solution Administer 1 drop to both eyes nightly.       escitalopram oxalate (LEXAPRO) 10 mg tablet TAKE 1 TABLET DAILY 90 Tab 3     Past Medical History:   Diagnosis Date  Aortic insufficiency 08/30/2015    moderate - leno dangelo md    Arthritis     Cervical disc disorder at C5-C6 level with radiculopathy 07/15/2020    and c6-7 -anterior cervical disc and fusion by Luis Blackmon MD    DDD (degenerative disc disease)     Diastolic dysfunction     Dyslipidemia     Glaucoma     Hypertension     LBP (low back pain)     S/P colonoscopy 4-13-15    hemorrhoids    Tinnitus of right ear 06/14/2018     Past Surgical History:   Procedure Laterality Date    ABDOMEN SURGERY PROC UNLISTED      bilateral hernia repair    HX COLONOSCOPY       Allergies   Allergen Reactions    Crestor [Rosuvastatin] Myalgia    Lipitor [Atorvastatin] Myalgia    Pravastatin Myalgia    Sulfa (Sulfonamide Antibiotics) Hives         REVIEW OF SYSTEMS:  General: negative for - chills or fever  ENT: negative for - headaches, nasal congestion or tinnitus  Respiratory: negative for - cough, hemoptysis, shortness of breath or wheezing  Cardiovascular : negative for - chest pain, edema, palpitations or shortness of breath  Gastrointestinal: negative for - abdominal pain, blood in stools, heartburn or nausea/vomiting  Genito-Urinary: no dysuria, trouble voiding, or hematuria  Musculoskeletal: negative for - gait disturbance, joint pain, joint stiffness or joint swelling  Neurological: no TIA or stroke symptoms  Hematologic: no bruises, no bleeding, no swollen glands  Integument: no lumps, mole changes, nail changes or rash  Endocrine: no malaise/lethargy or unexpected weight changes      Social History     Socioeconomic History    Marital status:      Spouse name: Not on file    Number of children: Not on file    Years of education: Not on file    Highest education level: Not on file   Tobacco Use    Smoking status: Former Smoker    Smokeless tobacco: Never Used   Substance and Sexual Activity    Alcohol use: Yes     Comment: occasional    Drug use: No    Sexual activity: Yes     Partners: Female   Social History Narrative    Medical History: Primary hypertensionDepressive disordernormal head CT May 15, 2007Mild aortic regurgitationDiastolic dysfunctionnormal left    ventricular systolic function ejection fraction 50% echocardiogram 2011negative stress w all motion study 2001 ejection    fraction 48%GlaucomaDecember 2013 CT abdomen and pelvis degenerative changes lumbar spine no acute process    Surgical History: hernia repair 2012colonoscopy 2005[facility] 2014    Hospitalization/Major Diagnostic Procedure: Denies Past Hospitalization    Family History: Mother: alive, hypertension,Father:  79 yrs, chfSister(s): aliveBrother(s): aliveDaughter(s): aliveSon(s):    aliveChildren: aliveAdopted: deceased2 brother(s) , 2 sister(s) . 1 son(s) , 1 daughter(s) . Social History: Alcohol Use Patient does not use alcohol. Smoking Status Patient is a never smoker. Exercise: running. Marital Status:    . Lives w ith: spouse - recently had bunion surgery - eric. 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 Education/School: high school. Family History   Problem Relation Age of Onset    Heart Disease Father        OBJECTIVE:    Visit Vitals  BP (!) 158/89   Pulse 72   Temp 98.1 °F (36.7 °C) (Oral)   Resp 18   Ht 5' 8\" (1.727 m)   Wt 171 lb 14.2 oz (78 kg)   SpO2 97%   BMI 26.14 kg/m²     CONSTITUTIONAL: well , well nourished, appears age appropriate  EYES: perrla, eom intact  ENMT:moist mucous membranes, pharynx clear  NECK: supple. Thyroid normal  RESPIRATORY: Chest: clear bilaterally   CARDIOVASCULAR: Heart: regular rate and rhythm  GASTROINTESTINAL: Abdomen: soft, bowel sounds active  HEMATOLOGIC: no pathological lymph nodes palpated  MUSCULOSKELETAL: Extremities: no edema, pulse 1+   INTEGUMENT: No unusual rashes or suspicious skin lesions noted.  Nails appear normal.  NEUROLOGIC: non-focal exam   MENTAL STATUS: alert and oriented, appropriate affect           ASSESSMENT:  1. Essential hypertension    2. Aortic valve insufficiency, etiology of cardiac valve disease unspecified    3. Chronic midline low back pain without sciatica    4. Diastolic dysfunction    5. Arthritis    6. Dyslipidemia    7. Cervical disc disorder at C5-C6 level with radiculopathy      Repeat BP is completely acceptable, 130/80. He has aortic valve disease, which is mild. He has a history of chronic low back pain, but this is not related to that, this is something entirely different. He has diastolic dysfunction, which is completely compensated, and a known history of arthritis. He is on Zetia for dyslipidemia as he is statin intolerant. The cervical disc disease may be producing the discomfort he describes. He has had a CT scan of the chest, which did not show any pathology apparently, and we will review that when we get [facility]'s records, and if it shows no pathology, then no further evaluation should be undertaken. This is most likely related to the ravages of the DDD and its effect on the nerve roots that are affecting the right side of his body. He will be back to see us in two to three months, sooner if needed. I suspect this is a neuropathic pain. He was placed on Nortriptyline by Dr. Nancy Marie. The only other option I have for him would be a pain management referral, particularly since he has had a chest CT that was negative. He will talk to Dr. Nancy Marie about that, and if he does not have any other suggestions or a pain management referral, we can certainly give him a referral.  He will be back to see us in about three months, sooner if he has any problems. I have discussed the diagnosis with the patient and the intended plan as seen in the  Orders. The patient understands and agees with the plan.   The patient has   received an after visit summary and questions were answered concerning  future plans  Patient labs and/or xrays were reviewed  Past records were reviewed. PLAN:  . No orders of the defined types were placed in this encounter. Follow-up and Dispositions    · Return in about 3 months (around 12/29/2020). ATTENTION:   This medical record was transcribed using an electronic medical records system. Although proofread, it may and can contain electronic and spelling errors. Other human spelling and other errors may be present. Corrections may be executed at a later time. Please feel free to contact us for any clarifications as needed.

## 2020-12-09 RX ORDER — ESCITALOPRAM OXALATE 10 MG/1
TABLET ORAL
Qty: 90 TAB | Refills: 3 | Status: SHIPPED | OUTPATIENT
Start: 2020-12-09 | End: 2021-12-06 | Stop reason: SDUPTHER

## 2021-03-15 ENCOUNTER — IMMUNIZATION (OUTPATIENT)
Dept: INTERNAL MEDICINE CLINIC | Age: 71
End: 2021-03-15
Payer: MEDICARE

## 2021-03-15 DIAGNOSIS — Z23 ENCOUNTER FOR IMMUNIZATION: Primary | ICD-10-CM

## 2021-03-15 PROCEDURE — 0001A COVID-19, MRNA, LNP-S, PF, 30MCG/0.3ML DOSE(PFIZER): CPT | Performed by: FAMILY MEDICINE

## 2021-03-15 PROCEDURE — 91300 COVID-19, MRNA, LNP-S, PF, 30MCG/0.3ML DOSE(PFIZER): CPT | Performed by: FAMILY MEDICINE

## 2021-04-05 ENCOUNTER — IMMUNIZATION (OUTPATIENT)
Dept: INTERNAL MEDICINE CLINIC | Age: 71
End: 2021-04-05
Payer: MEDICARE

## 2021-04-05 DIAGNOSIS — Z23 ENCOUNTER FOR IMMUNIZATION: Primary | ICD-10-CM

## 2021-04-05 PROCEDURE — 91300 COVID-19, MRNA, LNP-S, PF, 30MCG/0.3ML DOSE(PFIZER): CPT | Performed by: FAMILY MEDICINE

## 2021-04-05 PROCEDURE — 0002A COVID-19, MRNA, LNP-S, PF, 30MCG/0.3ML DOSE(PFIZER): CPT | Performed by: FAMILY MEDICINE

## 2021-06-22 ENCOUNTER — OFFICE VISIT (OUTPATIENT)
Dept: INTERNAL MEDICINE CLINIC | Age: 71
End: 2021-06-22
Payer: MEDICARE

## 2021-06-22 VITALS
TEMPERATURE: 98.6 F | RESPIRATION RATE: 18 BRPM | HEIGHT: 68 IN | BODY MASS INDEX: 25.61 KG/M2 | HEART RATE: 55 BPM | OXYGEN SATURATION: 99 % | WEIGHT: 169 LBS | SYSTOLIC BLOOD PRESSURE: 135 MMHG | DIASTOLIC BLOOD PRESSURE: 75 MMHG

## 2021-06-22 DIAGNOSIS — M50.122 CERVICAL DISC DISORDER AT C5-C6 LEVEL WITH RADICULOPATHY: ICD-10-CM

## 2021-06-22 DIAGNOSIS — R79.9 ABNORMAL FINDING OF BLOOD CHEMISTRY, UNSPECIFIED: ICD-10-CM

## 2021-06-22 DIAGNOSIS — R07.9 CHEST PAIN, UNSPECIFIED TYPE: ICD-10-CM

## 2021-06-22 DIAGNOSIS — E78.5 DYSLIPIDEMIA: ICD-10-CM

## 2021-06-22 DIAGNOSIS — G95.9 CERVICAL MYELOPATHY WITH CERVICAL RADICULOPATHY (HCC): ICD-10-CM

## 2021-06-22 DIAGNOSIS — Z01.818 PRE-OP EVALUATION: Primary | ICD-10-CM

## 2021-06-22 DIAGNOSIS — I51.89 DIASTOLIC DYSFUNCTION: ICD-10-CM

## 2021-06-22 DIAGNOSIS — R35.1 NOCTURIA: ICD-10-CM

## 2021-06-22 DIAGNOSIS — M54.12 CERVICAL MYELOPATHY WITH CERVICAL RADICULOPATHY (HCC): ICD-10-CM

## 2021-06-22 DIAGNOSIS — I35.1 AORTIC VALVE INSUFFICIENCY, ETIOLOGY OF CARDIAC VALVE DISEASE UNSPECIFIED: ICD-10-CM

## 2021-06-22 DIAGNOSIS — I10 ESSENTIAL HYPERTENSION: ICD-10-CM

## 2021-06-22 PROCEDURE — G8754 DIAS BP LESS 90: HCPCS | Performed by: INTERNAL MEDICINE

## 2021-06-22 PROCEDURE — G8752 SYS BP LESS 140: HCPCS | Performed by: INTERNAL MEDICINE

## 2021-06-22 PROCEDURE — G8536 NO DOC ELDER MAL SCRN: HCPCS | Performed by: INTERNAL MEDICINE

## 2021-06-22 PROCEDURE — G8419 CALC BMI OUT NRM PARAM NOF/U: HCPCS | Performed by: INTERNAL MEDICINE

## 2021-06-22 PROCEDURE — G8510 SCR DEP NEG, NO PLAN REQD: HCPCS | Performed by: INTERNAL MEDICINE

## 2021-06-22 PROCEDURE — 1101F PT FALLS ASSESS-DOCD LE1/YR: CPT | Performed by: INTERNAL MEDICINE

## 2021-06-22 PROCEDURE — G8427 DOCREV CUR MEDS BY ELIG CLIN: HCPCS | Performed by: INTERNAL MEDICINE

## 2021-06-22 PROCEDURE — 99215 OFFICE O/P EST HI 40 MIN: CPT | Performed by: INTERNAL MEDICINE

## 2021-06-22 PROCEDURE — 71046 X-RAY EXAM CHEST 2 VIEWS: CPT | Performed by: INTERNAL MEDICINE

## 2021-06-22 PROCEDURE — 3017F COLORECTAL CA SCREEN DOC REV: CPT | Performed by: INTERNAL MEDICINE

## 2021-06-22 RX ORDER — OLMESARTAN MEDOXOMIL 40 MG/1
1 TABLET ORAL
COMMUNITY
Start: 2021-04-22

## 2021-06-22 RX ORDER — SILDENAFIL 100 MG/1
100 TABLET, FILM COATED ORAL AS NEEDED
Qty: 10 TABLET | Refills: 11 | Status: SHIPPED | OUTPATIENT
Start: 2021-06-22 | End: 2021-12-06 | Stop reason: SDUPTHER

## 2021-06-22 RX ORDER — BACLOFEN 10 MG/1
1 TABLET ORAL
COMMUNITY
Start: 2021-04-22 | End: 2022-03-24 | Stop reason: ALTCHOICE

## 2021-06-22 RX ORDER — SILDENAFIL 100 MG/1
100 TABLET, FILM COATED ORAL AS NEEDED
Qty: 10 TABLET | Refills: 11 | Status: SHIPPED | OUTPATIENT
Start: 2021-06-22 | End: 2021-06-22 | Stop reason: SDUPTHER

## 2021-06-22 RX ORDER — HYDROCHLOROTHIAZIDE 25 MG/1
1 TABLET ORAL
COMMUNITY
Start: 2021-04-22

## 2021-06-22 RX ORDER — METOPROLOL SUCCINATE 50 MG/1
TABLET, EXTENDED RELEASE ORAL
COMMUNITY
Start: 2021-04-22

## 2021-06-22 RX ORDER — SILDENAFIL 100 MG/1
100 TABLET, FILM COATED ORAL AS NEEDED
Qty: 10 TABLET | Refills: 11 | Status: SHIPPED | OUTPATIENT
Start: 2021-06-22

## 2021-06-22 NOTE — PROGRESS NOTES
SPORTS MEDICINE AND PRIMARY CARE  Billy Shah MD, [address] [address]  Phone:  [phone]  Fax: [phone]       Chief Complaint   Patient presents with    Pre-op Exam   .      SUBJECTIVE:    Candace Benítez is a 80 y.o. male *Patient returns today for a preop evaluation. He has a known history of diastolic dysfunction, primary hypertension, aortic insufficiency, dyslipidemia, cervical disc disease at C5-6 with radiculopathy, and is seen for evaluation. He is going to have glaucoma surgery by Dr. Emma Cohn on 07/13/21, and is requesting a history and physical to be completed for [facility]. He denies specific complaints. He has had his COVID vaccine and is seen for evaluation. Current Outpatient Medications   Medication Sig Dispense Refill    baclofen (LIORESAL) 10 mg tablet Take 1 Tablet by mouth.  hydroCHLOROthiazide (HYDRODIURIL) 25 mg tablet Take 1 Tablet by mouth.  metoprolol succinate (TOPROL-XL) 50 mg XL tablet TAKE 1 TABLET BY MOUTH EVERY DAY      olmesartan (BENICAR) 40 mg tablet Take 1 Tablet by mouth.  sildenafil citrate (VIAGRA) 100 mg tablet Take 1 Tablet by mouth as needed for Erectile Dysfunction. 10 Tablet 11    sildenafil citrate (VIAGRA) 100 mg tablet Take 1 Tablet by mouth as needed for Erectile Dysfunction. 10 Tablet 11    escitalopram oxalate (LEXAPRO) 10 mg tablet TAKE 1 TABLET DAILY 90 Tab 3    ezetimibe (ZETIA) 10 mg tablet Take 1 Tab by mouth daily. 30 Tab 11    omega 3-dha-epa-fish oil (Fish Oil) 100-160-1,000 mg cap 1 Tab.  multivit-min/FA/lycopen/lutein (CENTRUM SILVER MEN PO) Take  by mouth.  lansoprazole (PREVACID) 30 mg capsule Take 1 Cap by mouth Daily (before breakfast). 30 Cap 11    brimonidine-timolol (COMBIGAN) 0.2-0.5 % drop ophthalmic solution 1 drop every twelve (12) hours.       latanoprost (XALATAN) 0.005 % ophthalmic solution Administer 1 drop to both eyes nightly.  pitavastatin calcium (LIVALO) 2 mg tablet Take 1 Tab by mouth daily.  (Patient not taking: Reported on 6/22/2021) 30 Tab 11     Past Medical History:   Diagnosis Date    Aortic insufficiency 08/30/2015    moderate - leno dangelo md    Arthritis     Cervical disc disorder at C5-C6 level with radiculopathy 07/15/2020    and c6-7 -anterior cervical disc and fusion by Josh Schultz MD    DDD (degenerative disc disease)     Diastolic dysfunction     Dyslipidemia     Glaucoma     Hypertension     LBP (low back pain)     S/P colonoscopy 4-13-15    hemorrhoids    Tinnitus of right ear 06/14/2018     Past Surgical History:   Procedure Laterality Date    HX COLONOSCOPY      [place] ABDOMEN SURGERY PROC UNLISTED      bilateral hernia repair     Allergies   Allergen Reactions    Crestor [Rosuvastatin] Myalgia    Lipitor [Atorvastatin] Myalgia    Pravastatin Myalgia    Sulfa (Sulfonamide Antibiotics) Hives         REVIEW OF SYSTEMS:  General: negative for - chills or fever  ENT: negative for - headaches, nasal congestion or tinnitus  Respiratory: negative for - cough, hemoptysis, shortness of breath or wheezing  Cardiovascular : negative for - chest pain, edema, palpitations or shortness of breath  Gastrointestinal: negative for - abdominal pain, blood in stools, heartburn or nausea/vomiting  Genito-Urinary: no dysuria, trouble voiding, or hematuria  Musculoskeletal: negative for - gait disturbance, joint pain, joint stiffness or joint swelling  Neurological: no TIA or stroke symptoms  Hematologic: no bruises, no bleeding, no swollen glands  Integument: no lumps, mole changes, nail changes or rash  Endocrine: no malaise/lethargy or unexpected weight changes      Social History     Socioeconomic History    Marital status:      Spouse name: Not on file    Number of children: Not on file    Years of education: Not on file    Highest education level: Not on file   Tobacco Use    Smoking status: Former Smoker    Smokeless tobacco: Never Used   Substance and Sexual Activity    Alcohol use: Yes     Comment: occasional    Drug use: No    Sexual activity: Yes     Partners: Female   Social History Narrative    Medical History: Primary hypertensionDepressive disordernormal head CT May 15, 2007Mild aortic regurgitationDiastolic dysfunctionnormal left    ventricular systolic function ejection fraction 50% echocardiogram 2011negative stress w all motion study 2001 ejection    fraction 48%GlaucomaDecember 2013 CT abdomen and pelvis degenerative changes lumbar spine no acute process        Surgical History: hernia repair 2012colonoscopy 2005[facility] 2014        Hospitalization/Major Diagnostic Procedure: Denies Past Hospitalization        Family History: Mother: alive, hypertension,Father:  79 yrs, chfSister(s): aliveBrother(s): aliveDaughter(s): aliveSon(s):aliveChildren: aliveAdopted: deceased2 brother(s) , 2 sister(s) . 1 son(s) , 1 daughter(s)     . Social History: Alcohol Use Patient does not use alcohol. Smoking Status Patient is a never smoker. Exercise: running. Marital Status: . Lives w ith: spouse - recently had bunion surgery - eric. 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 Education/School: high school. Social Determinants of Health     Financial Resource Strain:     Difficulty of Paying Living Expenses:    Food Insecurity:     Worried About Running Out of Food in the Last Year:     [address] in the Last Year:    Transportation Needs:     Lack of Transportation (Medical):      Lack of Transportation (Non-Medical):    Physical Activity:     Days of Exercise per Week:     Minutes of Exercise per Session:    Stress:     Feeling of Stress :    Social Connections:     Frequency of Communication with Friends and Family:     Frequency of Social Gatherings with Friends and Family:     Attends [employer] Services:     Active Member of Clubs or Organizations:     Attends Club or Organization Meetings:     Marital Status:      Family History   Problem Relation Age of Onset    Heart Disease Father        OBJECTIVE:    Visit Vitals  /75 (BP 1 Location: Left upper arm, BP Patient Position: Sitting)   Pulse (!) 55   Temp 98.6 °F (37 °C) (Oral)   Resp 18   Ht 5' 8\" (1.727 m)   Wt 169 lb (76.7 kg)   SpO2 99%   BMI 25.70 kg/m²     CONSTITUTIONAL: well , well nourished, appears age appropriate  EYES: perrla, eom intact  ENMT:moist mucous membranes, pharynx clear  NECK: supple. Thyroid normal  RESPIRATORY: Chest: clear bilaterally   CARDIOVASCULAR: Heart: regular rate and rhythm  GASTROINTESTINAL: Abdomen: soft, bowel sounds active  HEMATOLOGIC: no pathological lymph nodes palpated  MUSCULOSKELETAL: Extremities: no edema, pulse 1+   INTEGUMENT: No unusual rashes or suspicious skin lesions noted. Nails appear normal.  NEUROLOGIC: non-focal exam   MENTAL STATUS: alert and oriented, appropriate affect           ASSESSMENT:  1. Pre-op evaluation    2. Cervical myelopathy with cervical radiculopathy (HCC)    3. Cervical disc disorder at C5-C6 level with radiculopathy    4. Dyslipidemia    5. Aortic valve insufficiency, etiology of cardiac valve disease unspecified    6. Diastolic dysfunction    7. Essential hypertension    8. Nocturia     9. Abnormal finding of blood chemistry, unspecified     10. Chest pain, unspecified type      Patient's medical status is stable for the planned surgical procedure. Blood pressure control is at goal.    He remains at his ideal body weight. Appropriate laboratory studies will be checked and sent to him in the mail and will advise Dr. Louise Herbert if something is in error. He has a cervical myelopathy with radiculopathy and cervical disc disorder, which is currently not symptomatic.     There is dyslipidemia, for which he is intolerant of statins and therefore we have him on Zetia and Pitavastatin at a small dose. Aortic valve insufficiency has been stable. He saw the cardiologist, who did not comment on the valve. He had a PET scan. There is a history of diastolic dysfunction with no evidence of cardiac decompensation. As noted above, blood pressure control is at goal.    He will be back to see me in one year, sooner if he has any problems. We will send him results of the labs as noted above. I have discussed the diagnosis with the patient and the intended plan as seen in the  Orders. The patient understands and agees with the plan. The patient has   received an after visit summary and questions were answered concerning  future plans  Patient labs and/or xrays were reviewed  Past records were reviewed. PLAN:  .  Orders Placed This Encounter    XR CHEST PA LAT    URINALYSIS W/ RFLX MICROSCOPIC    CBC WITH AUTOMATED DIFF    METABOLIC PANEL, COMPREHENSIVE    LIPID PANEL    PROSTATE SPECIFIC AG    HEMOGLOBIN A1C WITH EAG    baclofen (LIORESAL) 10 mg tablet    hydroCHLOROthiazide (HYDRODIURIL) 25 mg tablet    metoprolol succinate (TOPROL-XL) 50 mg XL tablet    olmesartan (BENICAR) 40 mg tablet    DISCONTD: sildenafil citrate (VIAGRA) 100 mg tablet    sildenafil citrate (VIAGRA) 100 mg tablet    sildenafil citrate (VIAGRA) 100 mg tablet       Follow-up and Dispositions    · Return in about 1 year (around 6/22/2022). ATTENTION:   This medical record was transcribed using an electronic medical records system. Although proofread, it may and can contain electronic and spelling errors. Other human spelling and other errors may be present. Corrections may be executed at a later time. Please feel free to contact us for any clarifications as needed.

## 2021-06-22 NOTE — PROGRESS NOTES
Dwayne Perez is a 80 y.o. male    Chief Complaint   Patient presents with    Pre-op Exam     1. Have you been to the ER, urgent care clinic since your last visit? Hospitalized since your last visit? No      2. Have you seen or consulted any other health care providers outside of the [address] [place] [address] since your last visit? Include any pap smears or colon screening.   No

## 2021-06-23 LAB
ALBUMIN SERPL-MCNC: 3.9 G/DL (ref 3.5–5)
ALBUMIN/GLOB SERPL: 1.2 {RATIO} (ref 1.1–2.2)
ALP SERPL-CCNC: 79 U/L (ref 45–117)
ALT SERPL-CCNC: 22 U/L (ref 12–78)
ANION GAP SERPL CALC-SCNC: 5 MMOL/L (ref 5–15)
APPEARANCE UR: CLEAR
AST SERPL-CCNC: 17 U/L (ref 15–37)
BASOPHILS # BLD: 0 K/UL (ref 0–0.1)
BASOPHILS NFR BLD: 1 % (ref 0–1)
BILIRUB SERPL-MCNC: 0.4 MG/DL (ref 0.2–1)
BILIRUB UR QL: NEGATIVE
BUN SERPL-MCNC: 21 MG/DL (ref 6–20)
BUN/CREAT SERPL: 17 (ref 12–20)
CALCIUM SERPL-MCNC: 9.3 MG/DL (ref 8.5–10.1)
CHLORIDE SERPL-SCNC: 104 MMOL/L (ref 97–108)
CHOLEST SERPL-MCNC: 218 MG/DL
CO2 SERPL-SCNC: 31 MMOL/L (ref 21–32)
COLOR UR: NORMAL
CREAT SERPL-MCNC: 1.27 MG/DL (ref 0.7–1.3)
DIFFERENTIAL METHOD BLD: ABNORMAL
EOSINOPHIL # BLD: 0.2 K/UL (ref 0–0.4)
EOSINOPHIL NFR BLD: 3 % (ref 0–7)
ERYTHROCYTE [DISTWIDTH] IN BLOOD BY AUTOMATED COUNT: 15.4 % (ref 11.5–14.5)
EST. AVERAGE GLUCOSE BLD GHB EST-MCNC: 97 MG/DL
GLOBULIN SER CALC-MCNC: 3.3 G/DL (ref 2–4)
GLUCOSE SERPL-MCNC: 82 MG/DL (ref 65–100)
GLUCOSE UR STRIP.AUTO-MCNC: NEGATIVE MG/DL
HBA1C MFR BLD: 5 % (ref 4–5.6)
HCT VFR BLD AUTO: 45 % (ref 36.6–50.3)
HDLC SERPL-MCNC: 70 MG/DL
HDLC SERPL: 3.1 {RATIO} (ref 0–5)
HGB BLD-MCNC: 14.9 G/DL (ref 12.1–17)
HGB UR QL STRIP: NEGATIVE
IMM GRANULOCYTES # BLD AUTO: 0 K/UL (ref 0–0.04)
IMM GRANULOCYTES NFR BLD AUTO: 0 % (ref 0–0.5)
KETONES UR QL STRIP.AUTO: NEGATIVE MG/DL
LDLC SERPL CALC-MCNC: 133.2 MG/DL (ref 0–100)
LEUKOCYTE ESTERASE UR QL STRIP.AUTO: NEGATIVE
LYMPHOCYTES # BLD: 1.7 K/UL (ref 0.8–3.5)
LYMPHOCYTES NFR BLD: 29 % (ref 12–49)
MCH RBC QN AUTO: 30.2 PG (ref 26–34)
MCHC RBC AUTO-ENTMCNC: 33.1 G/DL (ref 30–36.5)
MCV RBC AUTO: 91.3 FL (ref 80–99)
MONOCYTES # BLD: 0.6 K/UL (ref 0–1)
MONOCYTES NFR BLD: 10 % (ref 5–13)
NEUTS SEG # BLD: 3.4 K/UL (ref 1.8–8)
NEUTS SEG NFR BLD: 57 % (ref 32–75)
NITRITE UR QL STRIP.AUTO: NEGATIVE
NRBC # BLD: 0 K/UL (ref 0–0.01)
NRBC BLD-RTO: 0 PER 100 WBC
PH UR STRIP: 5.5 [PH] (ref 5–8)
PLATELET # BLD AUTO: 210 K/UL (ref 150–400)
PMV BLD AUTO: 11.5 FL (ref 8.9–12.9)
POTASSIUM SERPL-SCNC: 3.8 MMOL/L (ref 3.5–5.1)
PROT SERPL-MCNC: 7.2 G/DL (ref 6.4–8.2)
PROT UR STRIP-MCNC: NEGATIVE MG/DL
PSA SERPL-MCNC: 1.8 NG/ML (ref 0.01–4)
RBC # BLD AUTO: 4.93 M/UL (ref 4.1–5.7)
SODIUM SERPL-SCNC: 140 MMOL/L (ref 136–145)
SP GR UR REFRACTOMETRY: 1.02 (ref 1–1.03)
TRIGL SERPL-MCNC: 74 MG/DL (ref ?–150)
UROBILINOGEN UR QL STRIP.AUTO: 1 EU/DL (ref 0.2–1)
VLDLC SERPL CALC-MCNC: 14.8 MG/DL
WBC # BLD AUTO: 6 K/UL (ref 4.1–11.1)

## 2021-07-22 PROBLEM — Z01.818 PRE-OP EVALUATION: Status: RESOLVED | Noted: 2021-06-22 | Resolved: 2021-07-22

## 2021-07-31 RX ORDER — EZETIMIBE 10 MG/1
TABLET ORAL
Qty: 90 TABLET | Refills: 3 | Status: SHIPPED | OUTPATIENT
Start: 2021-07-31 | End: 2022-07-19

## 2021-11-08 ENCOUNTER — OFFICE VISIT (OUTPATIENT)
Dept: INTERNAL MEDICINE CLINIC | Age: 71
End: 2021-11-08
Payer: MEDICARE

## 2021-11-08 VITALS
SYSTOLIC BLOOD PRESSURE: 160 MMHG | BODY MASS INDEX: 25.58 KG/M2 | HEIGHT: 68 IN | DIASTOLIC BLOOD PRESSURE: 90 MMHG | OXYGEN SATURATION: 99 % | HEART RATE: 62 BPM | TEMPERATURE: 98.1 F | RESPIRATION RATE: 18 BRPM | WEIGHT: 168.8 LBS

## 2021-11-08 DIAGNOSIS — Z00.00 MEDICARE ANNUAL WELLNESS VISIT, SUBSEQUENT: Primary | ICD-10-CM

## 2021-11-08 DIAGNOSIS — M50.122 CERVICAL DISC DISORDER AT C5-C6 LEVEL WITH RADICULOPATHY: ICD-10-CM

## 2021-11-08 DIAGNOSIS — E78.5 DYSLIPIDEMIA: ICD-10-CM

## 2021-11-08 DIAGNOSIS — I51.89 DIASTOLIC DYSFUNCTION: ICD-10-CM

## 2021-11-08 DIAGNOSIS — I35.1 MILD AORTIC INSUFFICIENCY: ICD-10-CM

## 2021-11-08 DIAGNOSIS — Z23 NEEDS FLU SHOT: ICD-10-CM

## 2021-11-08 DIAGNOSIS — I10 PRIMARY HYPERTENSION: ICD-10-CM

## 2021-11-08 PROBLEM — K40.30 INGUINAL HERNIA OF RIGHT SIDE WITH OBSTRUCTION AND WITHOUT GANGRENE: Status: ACTIVE | Noted: 2021-11-08

## 2021-11-08 PROCEDURE — G8753 SYS BP > OR = 140: HCPCS | Performed by: INTERNAL MEDICINE

## 2021-11-08 PROCEDURE — G0008 ADMIN INFLUENZA VIRUS VAC: HCPCS | Performed by: INTERNAL MEDICINE

## 2021-11-08 PROCEDURE — G8536 NO DOC ELDER MAL SCRN: HCPCS | Performed by: INTERNAL MEDICINE

## 2021-11-08 PROCEDURE — 3017F COLORECTAL CA SCREEN DOC REV: CPT | Performed by: INTERNAL MEDICINE

## 2021-11-08 PROCEDURE — 1101F PT FALLS ASSESS-DOCD LE1/YR: CPT | Performed by: INTERNAL MEDICINE

## 2021-11-08 PROCEDURE — G8755 DIAS BP > OR = 90: HCPCS | Performed by: INTERNAL MEDICINE

## 2021-11-08 PROCEDURE — 90694 VACC AIIV4 NO PRSRV 0.5ML IM: CPT | Performed by: INTERNAL MEDICINE

## 2021-11-08 PROCEDURE — G8427 DOCREV CUR MEDS BY ELIG CLIN: HCPCS | Performed by: INTERNAL MEDICINE

## 2021-11-08 PROCEDURE — G8419 CALC BMI OUT NRM PARAM NOF/U: HCPCS | Performed by: INTERNAL MEDICINE

## 2021-11-08 PROCEDURE — G0439 PPPS, SUBSEQ VISIT: HCPCS | Performed by: INTERNAL MEDICINE

## 2021-11-08 PROCEDURE — G8510 SCR DEP NEG, NO PLAN REQD: HCPCS | Performed by: INTERNAL MEDICINE

## 2021-11-08 PROCEDURE — 99213 OFFICE O/P EST LOW 20 MIN: CPT | Performed by: INTERNAL MEDICINE

## 2021-11-08 NOTE — PATIENT INSTRUCTIONS
Medicare Wellness Visit, Male    The best way to live healthy is to have a lifestyle where you eat a well-balanced diet, exercise regularly, limit alcohol use, and quit all forms of tobacco/nicotine, if applicable. Regular preventive services are another way to keep healthy. Preventive services (vaccines, screening tests, monitoring & exams) can help personalize your care plan, which helps you manage your own care. Screening tests can find health problems at the earliest stages, when they are easiest to treat. [facility] follows the current, evidence-based guidelines published by the [facility] ([facility]) when recommending preventive services for our patients. Because we follow these guidelines, sometimes recommendations change over time as research supports it. (For example, a prostate screening blood test is no longer routinely recommended for men with no symptoms). Of course, you and your doctor may decide to screen more often for some diseases, based on your risk and co-morbidities (chronic disease you are already diagnosed with). Preventive services for you include:  - Medicare offers their members a free annual wellness visit, which is time for you and your primary care provider to discuss and plan for your preventive service needs. Take advantage of this benefit every year!  -All adults over age 72 should receive the recommended pneumonia vaccines. Current USPSTF guidelines recommend a series of two vaccines for the best pneumonia protection.   -All adults should have a flu vaccine yearly and tetanus vaccine every 10 years.  -All adults age 48 and older should receive the shingles vaccines (series of two vaccines).        -All adults age 38-68 who are overweight should have a diabetes screening test once every three years.   -Other screening tests & preventive services for persons with diabetes include: an eye exam to screen for diabetic retinopathy, a kidney function test, a foot exam, and stricter control over your cholesterol.   -Cardiovascular screening for adults with routine risk involves an electrocardiogram (ECG) at intervals determined by the provider.   -Colorectal cancer screening should be done for adults age 54-65 with no increased risk factors for colorectal cancer. There are a number of acceptable methods of screening for this type of cancer. Each test has its own benefits and drawbacks. Discuss with your provider what is most appropriate for you during your annual wellness visit. The different tests include: colonoscopy (considered the best screening method), a fecal occult blood test, a fecal DNA test, and sigmoidoscopy.  -All adults born between [facility] should be screened once for Hepatitis C.  -An Abdominal Aortic Aneurysm (AAA) Screening is recommended for men age 73-68 who has ever smoked in their lifetime. Here is a list of your current Health Maintenance items (your personalized list of preventive services) with a due date:  Health Maintenance Due   Topic Date Due    Shingles Vaccine (1 of 2) Never done    Pneumococcal Vaccine (1 of 1 - PPSV23) Never done    Colorectal Screening  07/23/2016    Yearly Flu Vaccine (1) 09/01/2021    COVID-19 Vaccine (3 - Booster for Pfizer series) 10/05/2021     Vaccine Information Statement    Influenza (Flu) Vaccine (Inactivated or Recombinant): What You Need to Know    Many vaccine information statements are available in Ethiopian and other languages. See www.immunize.org/vis. Hojas de información sobre vacunas están disponibles en español y en muchos otros idiomas. Visite www.immunize.org/vis. 1. Why get vaccinated? Influenza vaccine can prevent influenza (flu). Flu is a contagious disease that spreads around the [place] every year, usually between October and May. Anyone can get the flu, but it is more dangerous for some people.  Infants and young children, people 72 years and older, pregnant people, and people with certain health conditions or a weakened immune system are at greatest risk of flu complications. Pneumonia, bronchitis, sinus infections, and ear infections are examples of flu-related complications. If you have a medical condition, such as heart disease, cancer, or diabetes, flu can make it worse. Flu can cause fever and chills, sore throat, muscle aches, fatigue, cough, headache, and runny or stuffy nose. Some people may have vomiting and diarrhea, though this is more common in children than adults. In an average year, thousands of people in the [facility] die from flu, and many more are hospitalized. Flu vaccine prevents millions of illnesses and flu-related visits to the doctor each year. 2. Influenza vaccines     CDC recommends everyone 6 months and older get vaccinated every flu season. Children 6 months through 6years of age may need 2 doses during a single flu season. Everyone else needs only 1 dose each flu season. It takes about 2 weeks for protection to develop after vaccination. There are many flu viruses, and they are always changing. Each year a new flu vaccine is made to protect against the influenza viruses believed to be likely to cause disease in the upcoming flu season. Even when the vaccine doesnt exactly match these viruses, it may still provide some protection. Influenza vaccine does not cause flu. Influenza vaccine may be given at the same time as other vaccines. 3. Talk with your health care provider    Tell your vaccination provider if the person getting the vaccine:   Has had an allergic reaction after a previous dose of influenza vaccine, or has any severe, life-threatening allergies    Has ever had Guillain-Barré Syndrome (also called GBS)    In some cases, your health care provider may decide to postpone influenza vaccination until a future visit.     Influenza vaccine can be administered at any time during pregnancy. People who are or will be pregnant during influenza season should receive inactivated influenza vaccine. People with minor illnesses, such as a cold, may be vaccinated. People who are moderately or severely ill should usually wait until they recover before getting influenza vaccine. Your health care provider can give you more information. 4. Risks of a vaccine reaction     Soreness, redness, and swelling where the shot is given, fever, muscle aches, and headache can happen after influenza vaccination.  There may be a very small increased risk of Guillain-Barré Syndrome (GBS) after inactivated influenza vaccine (the flu shot). Vernona Gab children who get the flu shot along with pneumococcal vaccine (PCV13) and/or DTaP vaccine at the same time might be slightly more likely to have a seizure caused by fever. Tell your health care provider if a child who is getting flu vaccine has ever had a seizure. People sometimes faint after medical procedures, including vaccination. Tell your provider if you feel dizzy or have vision changes or ringing in the ears. As with any medicine, there is a very remote chance of a vaccine causing a severe allergic reaction, other serious injury, or death. 5. What if there is a serious problem? An allergic reaction could occur after the vaccinated person leaves the clinic. If you see signs of a severe allergic reaction (hives, swelling of the face and throat, difficulty breathing, a fast heartbeat, dizziness, or weakness), call 9-1-1 and get the person to the nearest hospital.    For other signs that concern you, call your health care provider. Adverse reactions should be reported to the Vaccine Adverse Event Reporting System (VAERS). Your health care provider will usually file this report, or you can do it yourself. Visit the VAERS website at www.vaers. hhs.gov or call 4-111.427.2894.  VAERS is only for reporting reactions, and VAERS staff members do not give medical advice. 6. The National Vaccine Injury Compensation Program    The [facility] Vaccine Injury Compensation Program (VICP) is a federal program that was created to compensate people who may have been injured by certain vaccines. Claims regarding alleged injury or death due to vaccination have a time limit for filing, which may be as short as two years. Visit the VICP website at www.[facility].gov/vaccinecompensation or call 3-888.113.3273 to learn about the program and about filing a claim. 7. How can I learn more?  Ask your health care provider.  Call your local or state health department.  Visit the website of the Food and Drug Administration (FDA) for vaccine package inserts and additional information at www.fda.gov/vaccines-blood-biologics/vaccines.  Contact the Centers for Disease Control and Prevention (CDC):  - Call 3-161.934.2252 (1-800-CDC-INFO) or  - Visit CDCs influenza website at www.cdc.gov/flu. Vaccine Information Statement   Inactivated Influenza Vaccine   8/6/2021  42 ANGÉLICA Aguiar [id number]   Department of Health and Human Services  Centers for Disease Control and Prevention    Office Use Only

## 2021-11-08 NOTE — PROGRESS NOTES
Chief Complaint   Patient presents with    Hernia (Non Specific)     1. Have you been to the ER, urgent care clinic since your last visit? Hospitalized since your last visit? No    2. Have you seen or consulted any other health care providers outside of the [address] [place] [address] since your last visit? Include any pap smears or colon screening. No   Visit Vitals  BP (!) 160/90   Pulse 62   Temp 98.1 °F (36.7 °C)   Resp 18   Ht 5' 8\" (1.727 m)   Wt 168 lb 12.8 oz (76.6 kg)   SpO2 99%   BMI 25.67 kg/m²         This is the Subsequent Medicare Annual Wellness Exam, performed 12 months or more after the Initial AWV or the last Subsequent AWV    I have reviewed the patient's medical history in detail and updated the computerized patient record. Assessment/Plan   Education and counseling provided:  Are appropriate based on today's review and evaluation    1. Medicare annual wellness visit, subsequent       Depression Risk Factor Screening     3 most recent PHQ Screens 11/8/2021   PHQ Not Done -   Little interest or pleasure in doing things Not at all   Feeling down, depressed, irritable, or hopeless Not at all   Total Score PHQ 2 0       Alcohol Risk Screen    Do you average more than 1 drink per night or more than 7 drinks a week: No    In the past three months have you have had more than 4 drinks containing alcohol on one occasion: No        Functional Ability and Level of Safety    Hearing: Hearing is good. Activities of Daily Living: The home contains: no safety equipment. Patient does total self care      Ambulation: with no difficulty     Fall Risk:  Fall Risk Assessment, last 12 mths 11/8/2021   Able to walk? Yes   Fall in past 12 months? 0   Do you feel unsteady?  0   Are you worried about falling 0      Abuse Screen:  Patient is not abused       Cognitive Screening    Has your family/caregiver stated any concerns about your memory: no     Cognitive Screening: Normal - MMSE (Mini Mental Status Exam)    Health Maintenance Due     Health Maintenance Due   Topic Date Due    Shingrix Vaccine Age 49> (1 of 2) Never done    Pneumococcal 65+ years (1 of 1 - PPSV23) Never done    Colorectal Cancer Screening Combo  07/23/2016    Flu Vaccine (1) 09/01/2021    COVID-19 Vaccine (3 - Booster for Pfizer series) 10/05/2021       Patient Care Team   Patient Care Team:  Michael Hill MD as PCP - General (Internal Medicine)  Michael Hill MD as PCP - [facility]    History     Patient Active Problem List   Diagnosis Code    Hypertension I10    Arthritis M19.90    Low back pain W07.58    Diastolic dysfunction Q59.24    Aortic insufficiency I35.1    Mild aortic insufficiency I35.1    Tinnitus of right ear H93.11    Dyslipidemia E78.5    Cervical disc disorder at C5-C6 level with radiculopathy M50.122     Past Medical History:   Diagnosis Date    Aortic insufficiency 08/30/2015    moderate - leno dangelo md    Arthritis     Cervical disc disorder at C5-C6 level with radiculopathy 07/15/2020    and c6-7 -anterior cervical disc and fusion by Delia Olguin MD    DDD (degenerative disc disease)     Diastolic dysfunction     Dyslipidemia     Glaucoma     Hypertension     LBP (low back pain)     S/P colonoscopy 4-13-15    hemorrhoids    Tinnitus of right ear 06/14/2018      Past Surgical History:   Procedure Laterality Date    HX COLONOSCOPY      AK ABDOMEN SURGERY PROC UNLISTED      bilateral hernia repair     Current Outpatient Medications   Medication Sig Dispense Refill    ezetimibe (ZETIA) 10 mg tablet TAKE 1 TABLET BY MOUTH EVERY DAY 90 Tablet 3    baclofen (LIORESAL) 10 mg tablet Take 1 Tablet by mouth.  hydroCHLOROthiazide (HYDRODIURIL) 25 mg tablet Take 1 Tablet by mouth.       metoprolol succinate (TOPROL-XL) 50 mg XL tablet TAKE 1 TABLET BY MOUTH EVERY DAY      sildenafil citrate (VIAGRA) 100 mg tablet Take 1 Tablet by mouth as needed for Erectile Dysfunction. 10 Tablet 11    omega 3-dha-epa-fish oil (Fish Oil) 100-160-1,000 mg cap 1 Tab.  multivit-min/FA/lycopen/lutein (CENTRUM SILVER MEN PO) Take  by mouth.  lansoprazole (PREVACID) 30 mg capsule Take 1 Cap by mouth Daily (before breakfast). 30 Cap 11    brimonidine-timolol (COMBIGAN) 0.2-0.5 % drop ophthalmic solution 1 drop every twelve (12) hours.  latanoprost (XALATAN) 0.005 % ophthalmic solution Administer 1 drop to both eyes nightly.  olmesartan (BENICAR) 40 mg tablet Take 1 Tablet by mouth. (Patient not taking: Reported on 11/8/2021)      sildenafil citrate (VIAGRA) 100 mg tablet Take 1 Tablet by mouth as needed for Erectile Dysfunction. 10 Tablet 11    escitalopram oxalate (LEXAPRO) 10 mg tablet TAKE 1 TABLET DAILY (Patient not taking: Reported on 11/8/2021) 90 Tab 3    pitavastatin calcium (LIVALO) 2 mg tablet Take 1 Tab by mouth daily.  (Patient not taking: Reported on 6/22/2021) 30 Tab 11     Allergies   Allergen Reactions    Crestor [Rosuvastatin] Myalgia    Lipitor [Atorvastatin] Myalgia    Pravastatin Myalgia    Sulfa (Sulfonamide Antibiotics) Hives       Family History   Problem Relation Age of Onset    Heart Disease Father      Social History     Tobacco Use    Smoking status: Former Smoker    Smokeless tobacco: Never Used   Substance Use Topics    Alcohol use: Yes     Comment: occasional         Velta Burkitt

## 2021-11-08 NOTE — PROGRESS NOTES
SPORTS MEDICINE AND PRIMARY CARE  Rodolfo Eisenberg MD, [address] [id number] [facility],[address] [address]  Phone:  [phone]  Fax: [phone]      Chief Complaint   Patient presents with    Hernia (Non Specific)    Annual Wellness Visit         SUBECTIVE:    Mary Perez is a 80 y.o. male Patient returns today with a history of hypertensive diastolic dysfunction, moderate aortic insufficiency, diastolic dysfunction, dyslipidemia, cervical disc disease and is seen for evaluation. Patient returns today noting he is wearing a truss now for the right inguinal hernia. It does not really bother him that much. When he is at home it does not bother him at all. Other new complaints denied except that related to his age, he tells me. Current Outpatient Medications   Medication Sig Dispense Refill    ezetimibe (ZETIA) 10 mg tablet TAKE 1 TABLET BY MOUTH EVERY DAY 90 Tablet 3    baclofen (LIORESAL) 10 mg tablet Take 1 Tablet by mouth.  hydroCHLOROthiazide (HYDRODIURIL) 25 mg tablet Take 1 Tablet by mouth.  metoprolol succinate (TOPROL-XL) 50 mg XL tablet TAKE 1 TABLET BY MOUTH EVERY DAY      sildenafil citrate (VIAGRA) 100 mg tablet Take 1 Tablet by mouth as needed for Erectile Dysfunction. 10 Tablet 11    omega 3-dha-epa-fish oil (Fish Oil) 100-160-1,000 mg cap 1 Tab.  multivit-min/FA/lycopen/lutein (CENTRUM SILVER MEN PO) Take  by mouth.  brimonidine-timolol (COMBIGAN) 0.2-0.5 % drop ophthalmic solution 1 drop every twelve (12) hours.  latanoprost (XALATAN) 0.005 % ophthalmic solution Administer 1 drop to both eyes nightly.  olmesartan (BENICAR) 40 mg tablet Take 1 Tablet by mouth. (Patient not taking: Reported on 11/8/2021)      sildenafil citrate (VIAGRA) 100 mg tablet Take 1 Tablet by mouth as needed for Erectile Dysfunction.  10 Tablet 11    escitalopram oxalate (LEXAPRO) 10 mg tablet TAKE 1 TABLET DAILY (Patient not taking: Reported on 11/8/2021) 90 Tab 3    pitavastatin calcium (LIVALO) 2 mg tablet Take 1 Tab by mouth daily. (Patient not taking: Reported on 6/22/2021) 30 Tab 11     Past Medical History:   Diagnosis Date    Aortic insufficiency 08/30/2015    moderate - leno dangelo md    Arthritis     Cervical disc disorder at C5-C6 level with radiculopathy 07/15/2020    and c6-7 -anterior cervical disc and fusion by Maureen David MD    DDD (degenerative disc disease)     Diastolic dysfunction     Dyslipidemia     Glaucoma     Hypertension     Inguinal hernia of right side with obstruction and without gangrene 11/08/2021    LBP (low back pain)     S/P colonoscopy 4-13-15    hemorrhoids    Tinnitus of right ear 06/14/2018     Past Surgical History:   Procedure Laterality Date    HX COLONOSCOPY      [facility] ABDOMEN SURGERY PROC UNLISTED      bilateral hernia repair     Allergies   Allergen Reactions    Crestor [Rosuvastatin] Myalgia    Lipitor [Atorvastatin] Myalgia    Pravastatin Myalgia    Sulfa (Sulfonamide Antibiotics) Hives       REVIEW OF SYSTEMS:   No chest pain, no shortness of breath.         Social History     Socioeconomic History    Marital status:    Tobacco Use    Smoking status: Former Smoker    Smokeless tobacco: Never Used   Vaping Use    Vaping Use: Never used   Substance and Sexual Activity    Alcohol use: Yes     Comment: occasional    Drug use: No    Sexual activity: Yes     Partners: Female   Social History Narrative    Medical History: Primary hypertensionDepressive disordernormal head CT May 15, 2007Mild aortic regurgitationDiastolic dysfunctionnormal left    ventricular systolic function ejection fraction 50% echocardiogram October 13, 2011negative stress w all motion study June 4, 2001 ejection    fraction 48%GlaucomaDecember 31, 2013 CT abdomen and pelvis degenerative changes lumbar spine no acute process        Surgical History: hernia repair January 11, 2012colonoscopy August 12, [facility] 05/2014 Hospitalization/Major Diagnostic Procedure: Denies Past Hospitalization        Family History: Mother: alive, hypertension,Father:  79 yrs, chfSister(s): aliveBrother(s): aliveDaughter(s): aliveSon(s):aliveChildren: aliveAdopted: deceased2 brother(s) , 2 sister(s) . 1 son(s) , 1 daughter(s)     . Social History: Alcohol Use Patient does not use alcohol. Smoking Status Patient is a never smoker. Exercise: running. Marital Status: . Lives w ith: spouse - recently had bunion surgery - eric. 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 Education/School: high school. - [place]. [place]  Family History   Problem Relation Age of Onset    Heart Disease Father        OBJECTIVE:  Visit Vitals  BP (!) 160/90   Pulse 62   Temp 98.1 °F (36.7 °C)   Resp 18   Ht 5' 8\" (1.727 m)   Wt 168 lb 12.8 oz (76.6 kg)   SpO2 99%   BMI 25.67 kg/m²     ENT: perrla,  eom intact  NECK: supple. Thyroid normal  CHEST: clear to ascultation and percussion   HEART: regular rate and rhythm  ABD: soft, bowel sounds active  EXTREMITIES: no edema, pulse 1+     No visits with results within 3 Month(s) from this visit. Latest known visit with results is:   Office Visit on 2021   Component Date Value Ref Range Status    Prostate Specific Ag 2021 1.8  0.01 - 4.0 ng/mL Final    Comment: Method used is [employer]  (NOTE)  Many types of test methods are used to measure PSA and can yield   different results on any given specimen. Therefore PSA results from   different laboratories on the same patient are not directly   comparable. In addition, PSA values by themselves should not be   interpreted as the presence or absence of cancer.   PSA values used to   monitor for biochemical recurrence of prostate cancer should be   interpreted in accordance with current clinical guidelines (e.g. the   2013 American Urological Association (AUA) guidelines and the 2015  Association of Urology (EAU) guidelines).  Hemoglobin A1c 06/22/2021 5.0  4.0 - 5.6 % Final    Comment: NEW METHOD PLEASE NOTE NEW REFERENCE RANGE  (NOTE)  HbA1C Interpretive Ranges  <5.7              Normal  5.7 - 6.4         Consider Prediabetes  >6.5              Consider Diabetes      Est. average glucose 06/22/2021 97  mg/dL Final    Cholesterol, total 06/22/2021 218* <200 MG/DL Final    Triglyceride 06/22/2021 74  <150 MG/DL Final    Comment: Based on NCEP-ATP III:  Triglycerides <150 mg/dL  is considered normal, 150-199  mg/dL  borderline high,  200-499 mg/dL high and  greater than or equal to 500  mg/dL very high.  HDL Cholesterol 06/22/2021 70  MG/DL Final    Comment: Based on NCEP ATP III, HDL Cholesterol <40 mg/dL is considered low and >60  mg/dL is elevated.       LDL, calculated 06/22/2021 133.2* 0 - 100 MG/DL Final    Comment: Based on the NCEP-ATP: LDL-C concentrations are considered  optimal <100 mg/dL,  near optimal/above Normal 100-129 mg/dL Borderline High: 130-159, High: 160-189  mg/dL Very High: Greater than or equal to 190 mg/dL      VLDL, calculated 06/22/2021 14.8  MG/DL Final    CHOL/HDL Ratio 06/22/2021 3.1  0.0 - 5.0   Final    Sodium 06/22/2021 140  136 - 145 mmol/L Final    Potassium 06/22/2021 3.8  3.5 - 5.1 mmol/L Final    Chloride 06/22/2021 104  97 - 108 mmol/L Final    CO2 06/22/2021 31  21 - 32 mmol/L Final    Anion gap 06/22/2021 5  5 - 15 mmol/L Final    Glucose 06/22/2021 82  65 - 100 mg/dL Final    BUN 06/22/2021 21* 6 - 20 MG/DL Final    Creatinine 06/22/2021 1.27  0.70 - 1.30 MG/DL Final    BUN/Creatinine ratio 06/22/2021 17  12 - 20   Final    GFR est AA 06/22/2021 >60  >60 ml/min/1.73m2 Final    GFR est non-AA 06/22/2021 56* >60 ml/min/1.73m2 Final    Comment: Estimated GFR is calculated using the IDMS-traceable Modification of Diet in  Renal Disease (MDRD) Study equation, reported for both  Americans  (GFRAA) and non- Americans (GFRNA), and normalized to 1.73m2 body  surface area. The physician must decide which value applies to the patient.  Calcium 06/22/2021 9.3  8.5 - 10.1 MG/DL Final    Bilirubin, total 06/22/2021 0.4  0.2 - 1.0 MG/DL Final    ALT (SGPT) 06/22/2021 22  12 - 78 U/L Final    AST (SGOT) 06/22/2021 17  15 - 37 U/L Final    Alk. phosphatase 06/22/2021 79  45 - 117 U/L Final    Protein, total 06/22/2021 7.2  6.4 - 8.2 g/dL Final    Albumin 06/22/2021 3.9  3.5 - 5.0 g/dL Final    Globulin 06/22/2021 3.3  2.0 - 4.0 g/dL Final    A-G Ratio 06/22/2021 1.2  1.1 - 2.2   Final    WBC 06/22/2021 6.0  4.1 - 11.1 K/uL Final    RBC 06/22/2021 4.93  4.10 - 5.70 M/uL Final    HGB 06/22/2021 14.9  12.1 - 17.0 g/dL Final    HCT 06/22/2021 45.0  36.6 - 50.3 % Final    MCV 06/22/2021 91.3  80.0 - 99.0 FL Final    MCH 06/22/2021 30.2  26.0 - 34.0 PG Final    MCHC 06/22/2021 33.1  30.0 - 36.5 g/dL Final    RDW 06/22/2021 15.4* 11.5 - 14.5 % Final    PLATELET 43/62/7854 651  150 - 400 K/uL Final    MPV 06/22/2021 11.5  8.9 - 12.9 FL Final    NRBC 06/22/2021 0.0  0  WBC Final    ABSOLUTE NRBC 06/22/2021 0.00  0.00 - 0.01 K/uL Final    NEUTROPHILS 06/22/2021 57  32 - 75 % Final    LYMPHOCYTES 06/22/2021 29  12 - 49 % Final    MONOCYTES 06/22/2021 10  5 - 13 % Final    EOSINOPHILS 06/22/2021 3  0 - 7 % Final    BASOPHILS 06/22/2021 1  0 - 1 % Final    IMMATURE GRANULOCYTES 06/22/2021 0  0.0 - 0.5 % Final    ABS. NEUTROPHILS 06/22/2021 3.4  1.8 - 8.0 K/UL Final    ABS. LYMPHOCYTES 06/22/2021 1.7  0.8 - 3.5 K/UL Final    ABS. MONOCYTES 06/22/2021 0.6  0.0 - 1.0 K/UL Final    ABS. EOSINOPHILS 06/22/2021 0.2  0.0 - 0.4 K/UL Final    ABS. BASOPHILS 06/22/2021 0.0  0.0 - 0.1 K/UL Final    ABS. IMM.  GRANS. 06/22/2021 0.0  0.00 - 0.04 K/UL Final    DF 06/22/2021 AUTOMATED    Final    Color 06/22/2021 YELLOW/STRAW    Final    Color Reference Range: Straw, Yellow or Dark Yellow    Appearance 06/22/2021 CLEAR  CLEAR   Final    Specific gravity 06/22/2021 1.021  1.003 - 1.030   Final    pH (UA) 06/22/2021 5.5  5.0 - 8.0   Final    Protein 06/22/2021 Negative  Negative mg/dL Final    Glucose 06/22/2021 Negative  Negative mg/dL Final    Ketone 06/22/2021 Negative  Negative mg/dL Final    Bilirubin 06/22/2021 Negative  Negative   Final    Blood 06/22/2021 Negative  Negative   Final    Urobilinogen 06/22/2021 1.0  0.2 - 1.0 EU/dL Final    Nitrites 06/22/2021 Negative  Negative   Final    Leukocyte Esterase 06/22/2021 Negative  Negative   Final          ASSESSMENT:  1. Medicare annual wellness visit, subsequent    2. Needs flu shot    3. Primary hypertension    4. Diastolic dysfunction    5. Mild aortic insufficiency    6. Dyslipidemia    7. Cervical disc disorder at C5-C6 level with radiculopathy      Blood pressure control is unacceptable. I am not clear to what medications he is taking and therefore he will send me a MyChart note and I will make a titration of his medications as I would like his blood pressure in the 130s/80 or less. He has diastolic dysfunction, mild aortic insufficiency, for which he follows with Dr. Сергей Reddy. He is going to have an echocardiogram performed. History of dyslipidemia. We added Zetia on his last visit. We will check his lipid panel today and report to him the results. He still has some numbness in his leg from the cervical disc disorder, status post laminectomy. He will be back to see me in four to six months, sooner if he has any problems. We encouraged him to check his blood pressure at home. If it stays greater than 130/80, he will send us blood pressure readings and we will help him adjust his medicine till he gets down to the ideal blood pressure. I have discussed the diagnosis with the patient and the intended plan as seen in the  orders above. The patient understands and agees with the plan.   The patient has   received an after visit summary and questions were answered concerning  future plans  Patient labs and/or xrays were reviewed  Past records were reviewed. PLAN:  .  Orders Placed This Encounter    Influenza Vaccine, QUAD, 65 Yrs +  IM  (Fluad 75801 )       Follow-up and Dispositions    · Return in about 4 months (around 3/8/2022). ATTENTION:   This medical record was transcribed using an electronic medical records system. Although proofread, it may and can contain electronic and spelling errors. Other human spelling and other errors may be present. Corrections may be executed at a later time. Please feel free to contact us for any clarifications as needed.

## 2021-11-09 LAB
ALBUMIN SERPL-MCNC: 3.8 G/DL (ref 3.5–5)
ANION GAP SERPL CALC-SCNC: 3 MMOL/L (ref 5–15)
BUN SERPL-MCNC: 9 MG/DL (ref 6–20)
BUN/CREAT SERPL: 8 (ref 12–20)
CALCIUM SERPL-MCNC: 9.7 MG/DL (ref 8.5–10.1)
CHLORIDE SERPL-SCNC: 105 MMOL/L (ref 97–108)
CHOLEST SERPL-MCNC: 199 MG/DL
CO2 SERPL-SCNC: 32 MMOL/L (ref 21–32)
CREAT SERPL-MCNC: 1.12 MG/DL (ref 0.7–1.3)
GLUCOSE SERPL-MCNC: 92 MG/DL (ref 65–100)
HDLC SERPL-MCNC: 63 MG/DL
HDLC SERPL: 3.2 {RATIO} (ref 0–5)
LDLC SERPL CALC-MCNC: 125.6 MG/DL (ref 0–100)
PHOSPHATE SERPL-MCNC: 3.7 MG/DL (ref 2.6–4.7)
POTASSIUM SERPL-SCNC: 4.1 MMOL/L (ref 3.5–5.1)
SODIUM SERPL-SCNC: 140 MMOL/L (ref 136–145)
TRIGL SERPL-MCNC: 52 MG/DL (ref ?–150)
VLDLC SERPL CALC-MCNC: 10.4 MG/DL

## 2021-11-10 LAB
COMMENT, HOLDF: NORMAL
SAMPLES BEING HELD,HOLD: NORMAL

## 2021-11-18 RX ORDER — AMLODIPINE BESYLATE 5 MG/1
5 TABLET ORAL DAILY
Qty: 30 TABLET | Refills: 11 | Status: SHIPPED | OUTPATIENT
Start: 2021-11-18 | End: 2022-05-17 | Stop reason: SDUPTHER

## 2021-11-22 DIAGNOSIS — I35.1 MILD AORTIC INSUFFICIENCY: Primary | ICD-10-CM

## 2021-12-06 ENCOUNTER — OFFICE VISIT (OUTPATIENT)
Dept: INTERNAL MEDICINE CLINIC | Age: 71
End: 2021-12-06
Payer: MEDICARE

## 2021-12-06 VITALS
RESPIRATION RATE: 16 BRPM | HEIGHT: 68 IN | BODY MASS INDEX: 25.08 KG/M2 | SYSTOLIC BLOOD PRESSURE: 143 MMHG | DIASTOLIC BLOOD PRESSURE: 85 MMHG | WEIGHT: 165.5 LBS | HEART RATE: 68 BPM | TEMPERATURE: 98.7 F | OXYGEN SATURATION: 96 %

## 2021-12-06 DIAGNOSIS — I51.89 DIASTOLIC DYSFUNCTION: ICD-10-CM

## 2021-12-06 DIAGNOSIS — F32.A DEPRESSION, UNSPECIFIED DEPRESSION TYPE: ICD-10-CM

## 2021-12-06 DIAGNOSIS — M19.90 ARTHRITIS: ICD-10-CM

## 2021-12-06 DIAGNOSIS — I35.1 AORTIC VALVE INSUFFICIENCY, ETIOLOGY OF CARDIAC VALVE DISEASE UNSPECIFIED: ICD-10-CM

## 2021-12-06 DIAGNOSIS — I10 PRIMARY HYPERTENSION: Primary | ICD-10-CM

## 2021-12-06 DIAGNOSIS — E78.5 DYSLIPIDEMIA: ICD-10-CM

## 2021-12-06 PROCEDURE — 3017F COLORECTAL CA SCREEN DOC REV: CPT | Performed by: INTERNAL MEDICINE

## 2021-12-06 PROCEDURE — 1101F PT FALLS ASSESS-DOCD LE1/YR: CPT | Performed by: INTERNAL MEDICINE

## 2021-12-06 PROCEDURE — G8432 DEP SCR NOT DOC, RNG: HCPCS | Performed by: INTERNAL MEDICINE

## 2021-12-06 PROCEDURE — G8427 DOCREV CUR MEDS BY ELIG CLIN: HCPCS | Performed by: INTERNAL MEDICINE

## 2021-12-06 PROCEDURE — G8419 CALC BMI OUT NRM PARAM NOF/U: HCPCS | Performed by: INTERNAL MEDICINE

## 2021-12-06 PROCEDURE — G8754 DIAS BP LESS 90: HCPCS | Performed by: INTERNAL MEDICINE

## 2021-12-06 PROCEDURE — G8536 NO DOC ELDER MAL SCRN: HCPCS | Performed by: INTERNAL MEDICINE

## 2021-12-06 PROCEDURE — G8753 SYS BP > OR = 140: HCPCS | Performed by: INTERNAL MEDICINE

## 2021-12-06 PROCEDURE — 99214 OFFICE O/P EST MOD 30 MIN: CPT | Performed by: INTERNAL MEDICINE

## 2021-12-06 RX ORDER — HYDRALAZINE HYDROCHLORIDE 25 MG/1
25 TABLET, FILM COATED ORAL 2 TIMES DAILY
Qty: 60 TABLET | Refills: 11 | Status: SHIPPED | OUTPATIENT
Start: 2021-12-06 | End: 2022-01-17 | Stop reason: SINTOL

## 2021-12-06 RX ORDER — ESCITALOPRAM OXALATE 10 MG/1
10 TABLET ORAL DAILY
Qty: 30 TABLET | Refills: 5 | Status: SHIPPED | OUTPATIENT
Start: 2021-12-06 | End: 2021-12-30 | Stop reason: SDUPTHER

## 2021-12-06 NOTE — PROGRESS NOTES
Chief Complaint   Patient presents with    Other     Patient states that he would like to discuss his \"mental state\" with Dr. Boubacar Watson. 1. Have you been to the ER, urgent care clinic since your last visit? Hospitalized since your last visit? No    2. Have you seen or consulted any other health care providers outside of the [address] [place] [address] since your last visit? Include any pap smears or colon screening.  No

## 2021-12-06 NOTE — PROGRESS NOTES
SPORTS MEDICINE AND PRIMARY CARE  Zakiya Barrett MD, [address] [address]  Phone:  [phone]  Fax: [phone]       Chief Complaint   Patient presents with    Other     Patient states that he would like to discuss his \"mental state\" with Dr. Una Gray. .      SUBJECTIVE:    Kaley Alejo is a 80 y.o. male Patient returns today with a known history of primary hypertension, arthritis, diastolic dysfunction, moderate aortic insufficiency, dyslipidemia, and is seen for evaluation. Tingling on right side in his right arm and right chest and flank area and his right hand. Wonders if it is related to the surgery he had, feel like they want to lock up all the time, as well as his low back. As noted above, he is having trouble sleeping. He jumps at night easily with little sounds and trouble getting back to sleep. He was on Lexapro for several years and wonders if he should be on it again. Patient is seen for evaluation. Current Outpatient Medications   Medication Sig Dispense Refill    escitalopram oxalate (LEXAPRO) 10 mg tablet Take 1 Tablet by mouth daily. 30 Tablet 5    hydrALAZINE (APRESOLINE) 25 mg tablet Take 1 Tablet by mouth two (2) times a day. 60 Tablet 11    ezetimibe (ZETIA) 10 mg tablet TAKE 1 TABLET BY MOUTH EVERY DAY 90 Tablet 3    baclofen (LIORESAL) 10 mg tablet Take 1 Tablet by mouth.  hydroCHLOROthiazide (HYDRODIURIL) 25 mg tablet Take 1 Tablet by mouth.  metoprolol succinate (TOPROL-XL) 50 mg XL tablet TAKE 1 TABLET BY MOUTH EVERY DAY      sildenafil citrate (VIAGRA) 100 mg tablet Take 1 Tablet by mouth as needed for Erectile Dysfunction. 10 Tablet 11    omega 3-dha-epa-fish oil (Fish Oil) 100-160-1,000 mg cap 1 Tab.  multivit-min/FA/lycopen/lutein (CENTRUM SILVER MEN PO) Take  by mouth.  brimonidine-timolol (COMBIGAN) 0.2-0.5 % drop ophthalmic solution 1 drop every twelve (12) hours.       latanoprost (XALATAN) 0.005 % ophthalmic solution Administer 1 drop to both eyes nightly.  amLODIPine (NORVASC) 5 mg tablet Take 1 Tablet by mouth daily. (Patient not taking: Reported on 12/6/2021) 30 Tablet 11    olmesartan (BENICAR) 40 mg tablet Take 1 Tablet by mouth. (Patient not taking: Reported on 11/8/2021)      pitavastatin calcium (LIVALO) 2 mg tablet Take 1 Tab by mouth daily.  (Patient not taking: Reported on 6/22/2021) 30 Tab 11     Past Medical History:   Diagnosis Date    Aortic insufficiency 08/30/2015    moderate - leno dangelo md    Arthritis     Cervical disc disorder at C5-C6 level with radiculopathy 07/15/2020    and c6-7 -anterior cervical disc and fusion by Jose A Oglesby MD    DDD (degenerative disc disease)     Diastolic dysfunction     Dyslipidemia     Glaucoma     Hypertension     Inguinal hernia of right side with obstruction and without gangrene 11/08/2021    LBP (low back pain)     S/P colonoscopy 4-13-15    hemorrhoids    Tinnitus of right ear 06/14/2018     Past Surgical History:   Procedure Laterality Date    HX COLONOSCOPY      [facility] ABDOMEN SURGERY PROC UNLISTED      bilateral hernia repair     Allergies   Allergen Reactions    Crestor [Rosuvastatin] Myalgia    Lipitor [Atorvastatin] Myalgia    Pravastatin Myalgia    Sulfa (Sulfonamide Antibiotics) Hives         REVIEW OF SYSTEMS:  General: negative for - chills or fever  ENT: negative for - headaches, nasal congestion or tinnitus  Respiratory: negative for - cough, hemoptysis, shortness of breath or wheezing  Cardiovascular : negative for - chest pain, edema, palpitations or shortness of breath  Gastrointestinal: negative for - abdominal pain, blood in stools, heartburn or nausea/vomiting  Genito-Urinary: no dysuria, trouble voiding, or hematuria  Musculoskeletal: negative for - gait disturbance, joint pain, joint stiffness or joint swelling  Neurological: no TIA or stroke symptoms  Hematologic: no bruises, no bleeding, no swollen glands  Integument: no lumps, mole changes, nail changes or rash  Endocrine: no malaise/lethargy or unexpected weight changes      Social History     Socioeconomic History    Marital status:    Tobacco Use    Smoking status: Former Smoker    Smokeless tobacco: Never Used   Vaping Use    Vaping Use: Never used   Substance and Sexual Activity    Alcohol use: Yes     Comment: occasional    Drug use: No    Sexual activity: Yes     Partners: Female   Social History Narrative    Medical History: Primary hypertensionDepressive disordernormal head CT May 15, 2007Mild aortic regurgitationDiastolic dysfunctionnormal left    ventricular systolic function ejection fraction 50% echocardiogram 2011negative stress w all motion study 2001 ejection    fraction 48%GlaucomaDecember 2013 CT abdomen and pelvis degenerative changes lumbar spine no acute process        Surgical History: hernia repair 2012colonoscopy 2005[facility] 2014        Hospitalization/Major Diagnostic Procedure: Denies Past Hospitalization        Family History: Mother: alive, hypertension,Father:  79 yrs, chfSister(s): aliveBrother(s): aliveDaughter(s): aliveSon(s):aliveChildren: aliveAdopted: deceased2 brother(s) , 2 sister(s) . 1 son(s) , 1 daughter(s)     . Social History: Alcohol Use Patient does not use alcohol. Smoking Status Patient is a never smoker. Exercise: running. Marital Status: . Lives w ith: spouse - recently had bunion surgery - eric. 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 Education/School: high school. - [place].  [place]     Family History   Problem Relation Age of Onset    Heart Disease Father        OBJECTIVE:    Visit Vitals  BP (!) 143/85   Pulse 68   Temp 98.7 °F (37.1 °C) (Oral)   Resp 16   Ht 5' 8\" (1.727 m)   Wt 165 lb 8 oz (75.1 kg)   SpO2 96%   BMI 25.16 kg/m²     CONSTITUTIONAL: well , well nourished, appears age appropriate  EYES: perrla, eom intact  ENMT:moist mucous membranes, pharynx clear  NECK: supple. Thyroid normal  RESPIRATORY: Chest: clear bilaterally   CARDIOVASCULAR: Heart: regular rate and rhythm  GASTROINTESTINAL: Abdomen: soft, bowel sounds active  HEMATOLOGIC: no pathological lymph nodes palpated  MUSCULOSKELETAL: Extremities: no edema, pulse 1+   INTEGUMENT: No unusual rashes or suspicious skin lesions noted. Nails appear normal.  NEUROLOGIC: non-focal exam   MENTAL STATUS: alert and oriented, appropriate affect           ASSESSMENT:  1. Primary hypertension    2. Arthritis    3. Diastolic dysfunction    4. Aortic valve insufficiency, etiology of cardiac valve disease unspecified    5. Dyslipidemia    6. Depression, unspecified depression type      Blood pressure control is lacking and we will add Hydralazine and titrate the dose to get a blood pressure in the 130s/80s. DJD is quiescent, however I think that the symptoms related to his right side are more from the cervical disc disease and surgery he had performed. No evidence of diastolic dysfunction. AI is about the same. He is on a statin for the dyslipidemia. I think he is depressed. I think if the Lexapro was helping before, he should try it again. He will be back to see me in a month or so to see how his blood pressure is doing, but also how his mental state is doing. I have discussed the diagnosis with the patient and the intended plan as seen in the  Orders. The patient understands and agees with the plan. The patient has   received an after visit summary and questions were answered concerning  future plans  Patient labs and/or xrays were reviewed  Past records were reviewed.     PLAN:  .  Orders Placed This Encounter    escitalopram oxalate (LEXAPRO) 10 mg tablet    hydrALAZINE (APRESOLINE) 25 mg tablet                  ATTENTION:   This medical record was transcribed using an electronic medical records system. Although proofread, it may and can contain electronic and spelling errors. Other human spelling and other errors may be present. Corrections may be executed at a later time. Please feel free to contact us for any clarifications as needed.

## 2021-12-13 DIAGNOSIS — F32.A DEPRESSION, UNSPECIFIED DEPRESSION TYPE: Primary | ICD-10-CM

## 2021-12-30 RX ORDER — ESCITALOPRAM OXALATE 10 MG/1
10 TABLET ORAL DAILY
Qty: 30 TABLET | Refills: 5 | Status: SHIPPED | OUTPATIENT
Start: 2021-12-30 | End: 2022-01-03 | Stop reason: SDUPTHER

## 2022-01-03 RX ORDER — ESCITALOPRAM OXALATE 10 MG/1
10 TABLET ORAL DAILY
Qty: 30 TABLET | Refills: 5 | Status: SHIPPED | OUTPATIENT
Start: 2022-01-03 | End: 2022-04-15

## 2022-01-17 ENCOUNTER — OFFICE VISIT (OUTPATIENT)
Dept: INTERNAL MEDICINE CLINIC | Age: 72
End: 2022-01-17
Payer: MEDICARE

## 2022-01-17 VITALS
SYSTOLIC BLOOD PRESSURE: 146 MMHG | TEMPERATURE: 98.3 F | DIASTOLIC BLOOD PRESSURE: 79 MMHG | OXYGEN SATURATION: 97 % | BODY MASS INDEX: 25.42 KG/M2 | HEART RATE: 53 BPM | RESPIRATION RATE: 53 BRPM | HEIGHT: 68 IN | WEIGHT: 167.7 LBS

## 2022-01-17 DIAGNOSIS — E78.5 DYSLIPIDEMIA: ICD-10-CM

## 2022-01-17 DIAGNOSIS — M50.122 CERVICAL DISC DISORDER AT C5-C6 LEVEL WITH RADICULOPATHY: ICD-10-CM

## 2022-01-17 DIAGNOSIS — I10 PRIMARY HYPERTENSION: Primary | ICD-10-CM

## 2022-01-17 DIAGNOSIS — F32.A DEPRESSION, UNSPECIFIED DEPRESSION TYPE: ICD-10-CM

## 2022-01-17 DIAGNOSIS — M54.16 LUMBAR RADICULOPATHY: ICD-10-CM

## 2022-01-17 PROCEDURE — 1101F PT FALLS ASSESS-DOCD LE1/YR: CPT | Performed by: INTERNAL MEDICINE

## 2022-01-17 PROCEDURE — G8753 SYS BP > OR = 140: HCPCS | Performed by: INTERNAL MEDICINE

## 2022-01-17 PROCEDURE — G8754 DIAS BP LESS 90: HCPCS | Performed by: INTERNAL MEDICINE

## 2022-01-17 PROCEDURE — G8536 NO DOC ELDER MAL SCRN: HCPCS | Performed by: INTERNAL MEDICINE

## 2022-01-17 PROCEDURE — 99213 OFFICE O/P EST LOW 20 MIN: CPT | Performed by: INTERNAL MEDICINE

## 2022-01-17 PROCEDURE — G9717 DOC PT DX DEP/BP F/U NT REQ: HCPCS | Performed by: INTERNAL MEDICINE

## 2022-01-17 PROCEDURE — 3017F COLORECTAL CA SCREEN DOC REV: CPT | Performed by: INTERNAL MEDICINE

## 2022-01-17 PROCEDURE — G8427 DOCREV CUR MEDS BY ELIG CLIN: HCPCS | Performed by: INTERNAL MEDICINE

## 2022-01-17 PROCEDURE — G8419 CALC BMI OUT NRM PARAM NOF/U: HCPCS | Performed by: INTERNAL MEDICINE

## 2022-01-17 RX ORDER — PREDNISONE 20 MG/1
60 TABLET ORAL
Qty: 21 TABLET | Refills: 0 | Status: SHIPPED | OUTPATIENT
Start: 2022-01-17 | End: 2022-03-24 | Stop reason: ALTCHOICE

## 2022-01-17 NOTE — PROGRESS NOTES
1. Have you been to the ER, urgent care clinic since your last visit? Hospitalized since your last visit? No    2. Have you seen or consulted any other health care providers outside of the [address] [place] [address] since your last visit? Include any pap smears or colon screening.  No

## 2022-01-17 NOTE — PROGRESS NOTES
SPORTS MEDICINE AND PRIMARY CARE  Emmanuel Lombard, MD, [address] [address]  Phone:  [phone]  Fax: [phone]       Chief Complaint   Patient presents with    Hypertension   . SUBJECTIVE:    Gilford Etienne is a 80 y.o. male Patient returns today with a known history of primary hypertension, dyslipidemia, cervical disc disease, depression and is seen for evaluation. Pain in right hip, where he feels like there is a knot, and radiates down to his feet. His leg feels tight and knee feels tight. He has a known history of cervical disc disorder and has a C6-C7 anterior cervical disc fusion by Rachele Richardson two years ago. Patient is seen for evaluation. Current Outpatient Medications   Medication Sig Dispense Refill    predniSONE (DELTASONE) 20 mg tablet Take 60 mg by mouth daily (with breakfast). 21 Tablet 0    escitalopram oxalate (LEXAPRO) 10 mg tablet Take 1 Tablet by mouth daily. 30 Tablet 5    ezetimibe (ZETIA) 10 mg tablet TAKE 1 TABLET BY MOUTH EVERY DAY 90 Tablet 3    baclofen (LIORESAL) 10 mg tablet Take 1 Tablet by mouth.  hydroCHLOROthiazide (HYDRODIURIL) 25 mg tablet Take 1 Tablet by mouth.  metoprolol succinate (TOPROL-XL) 50 mg XL tablet TAKE 1 TABLET BY MOUTH EVERY DAY      olmesartan (BENICAR) 40 mg tablet Take 1 Tablet by mouth.  sildenafil citrate (VIAGRA) 100 mg tablet Take 1 Tablet by mouth as needed for Erectile Dysfunction. 10 Tablet 11    omega 3-dha-epa-fish oil (Fish Oil) 100-160-1,000 mg cap 1 Tab.  multivit-min/FA/lycopen/lutein (CENTRUM SILVER MEN PO) Take  by mouth.  pitavastatin calcium (LIVALO) 2 mg tablet Take 1 Tab by mouth daily. 30 Tab 11    brimonidine-timolol (COMBIGAN) 0.2-0.5 % drop ophthalmic solution 1 drop every twelve (12) hours.  latanoprost (XALATAN) 0.005 % ophthalmic solution Administer 1 drop to both eyes nightly.  amLODIPine (NORVASC) 5 mg tablet Take 1 Tablet by mouth daily. (Patient not taking: Reported on 12/6/2021) 30 Tablet 11     Past Medical History:   Diagnosis Date    Aortic insufficiency 08/30/2015    moderate - leno dangelo md    Arthritis     Cervical disc disorder at C5-C6 level with radiculopathy 07/15/2020    and c6-7 -anterior cervical disc and fusion by Cam Hurt MD    DDD (degenerative disc disease)     Depression     Diastolic dysfunction     Dyslipidemia     Glaucoma     Hypertension     Inguinal hernia of right side with obstruction and without gangrene 11/08/2021    LBP (low back pain)     Lumbar radiculopathy 01/17/2022    S/P colonoscopy 4-13-15    hemorrhoids    Tinnitus of right ear 06/14/2018     Past Surgical History:   Procedure Laterality Date    HX COLONOSCOPY      [place] ABDOMEN SURGERY PROC UNLISTED      bilateral hernia repair     Allergies   Allergen Reactions    Crestor [Rosuvastatin] Myalgia    Lipitor [Atorvastatin] Myalgia    Pravastatin Myalgia    Sulfa (Sulfonamide Antibiotics) Hives         REVIEW OF SYSTEMS:  General: negative for - chills or fever  ENT: negative for - headaches, nasal congestion or tinnitus  Respiratory: negative for - cough, hemoptysis, shortness of breath or wheezing  Cardiovascular : negative for - chest pain, edema, palpitations or shortness of breath  Gastrointestinal: negative for - abdominal pain, blood in stools, heartburn or nausea/vomiting  Genito-Urinary: no dysuria, trouble voiding, or hematuria  Musculoskeletal: negative for - gait disturbance, joint pain, joint stiffness or joint swelling  Neurological: no TIA or stroke symptoms  Hematologic: no bruises, no bleeding, no swollen glands  Integument: no lumps, mole changes, nail changes or rash  Endocrine: no malaise/lethargy or unexpected weight changes      Social History     Socioeconomic History    Marital status:    Tobacco Use    Smoking status: Former Smoker    Smokeless tobacco: Never Used   Vaping Use    Vaping Use: Never used   Substance and Sexual Activity    Alcohol use: Yes     Comment: occasional    Drug use: No    Sexual activity: Yes     Partners: Female   Social History Narrative    Medical History: Primary hypertensionDepressive disordernormal head CT May 15, 2007Mild aortic regurgitationDiastolic dysfunctionnormal left    ventricular systolic function ejection fraction 50% echocardiogram 2011negative stress w all motion study 2001 ejection    fraction 48%GlaucomaDecember 2013 CT abdomen and pelvis degenerative changes lumbar spine no acute process        Surgical History: hernia repair 2012colonoscopy 2005[facility] 2014        Hospitalization/Major Diagnostic Procedure: Denies Past Hospitalization        Family History: Mother: alive, hypertension,Father:  79 yrs, chfSister(s): aliveBrother(s): aliveDaughter(s): aliveSon(s):aliveChildren: aliveAdopted: deceased2 brother(s) , 2 sister(s) . 1 son(s) , 1 daughter(s)     . Social History: Alcohol Use Patient does not use alcohol. Smoking Status Patient is a never smoker. Exercise: running. Marital Status: . Lives w ith: spouse - recently had bunion surgery - eric. 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 Education/School: high school. - [place]. [place]     Family History   Problem Relation Age of Onset    Heart Disease Father        OBJECTIVE:    Visit Vitals  BP (!) 146/79   Pulse (!) 53   Temp 98.3 °F (36.8 °C) (Oral)   Resp (!) 53   Ht 5' 8\" (1.727 m)   Wt 167 lb 11.2 oz (76.1 kg)   SpO2 97%   BMI 25.50 kg/m²     CONSTITUTIONAL: well , well nourished, appears age appropriate  EYES: perrla, eom intact  ENMT:moist mucous membranes, pharynx clear  NECK: supple.  Thyroid normal  RESPIRATORY: Chest: clear bilaterally   CARDIOVASCULAR: Heart: regular rate and rhythm  GASTROINTESTINAL: Abdomen: soft, bowel sounds active  HEMATOLOGIC: no pathological lymph nodes palpated  MUSCULOSKELETAL: Extremities: no edema, pulse 1+   INTEGUMENT: No unusual rashes or suspicious skin lesions noted. Nails appear normal.  NEUROLOGIC: non-focal exam   MENTAL STATUS: alert and oriented, appropriate affect           ASSESSMENT:  1. Primary hypertension    2. Cervical disc disorder at C5-C6 level with radiculopathy    3. Dyslipidemia    4. Depression, unspecified depression type    5. Lumbar radiculopathy      Blood pressure control is significant improved at home. It was 121/76 today. Obviously there is a component of white coat hypertension, explaining the elevation today. No further adjustments will be made in his medication. Cervical disc disease and has no symptoms related to that radiculopathy. Dyslipidemia is controlled with Livalo and Zetia. He is intolerant of the other statins. Depression symptoms have improved with Lexapro. His current symptoms are related to lumbar radiculopathy. Apparently he has had this in the past, particularly in this weather, where he got an injection. We will give him prednisone for seven days and give him a referral to orthopedics for further evaluation of his back. We offer referral for physical therapy, but he would like to see the orthopedic doctor first.    So his blood pressure in the evening is dropping to less than 100 and we therefore stop the Hydralazine. He will be back to see us in three to four months, sooner if needed. I have discussed the diagnosis with the patient and the intended plan as seen in the  Orders. The patient understands and agees with the plan. The patient has   received an after visit summary and questions were answered concerning  future plans  Patient labs and/or xrays were reviewed  Past records were reviewed.     PLAN:  .  Orders Placed This Encounter    REFERRAL TO ORTHOPEDIC SURGERY    predniSONE (DELTASONE) 20 mg tablet       Follow-up and Dispositions    · Return in about 3 months (around 4/17/2022). ATTENTION:   This medical record was transcribed using an electronic medical records system. Although proofread, it may and can contain electronic and spelling errors. Other human spelling and other errors may be present. Corrections may be executed at a later time. Please feel free to contact us for any clarifications as needed.

## 2022-01-19 ENCOUNTER — OFFICE VISIT (OUTPATIENT)
Dept: ORTHOPEDIC SURGERY | Age: 72
End: 2022-01-19
Payer: MEDICARE

## 2022-01-19 VITALS — HEIGHT: 68 IN | WEIGHT: 165 LBS | BODY MASS INDEX: 25.01 KG/M2

## 2022-01-19 DIAGNOSIS — M43.16 SPONDYLOLISTHESIS OF LUMBAR REGION: ICD-10-CM

## 2022-01-19 DIAGNOSIS — M54.50 LUMBAR BACK PAIN: Primary | ICD-10-CM

## 2022-01-19 DIAGNOSIS — M48.061 SPINAL STENOSIS OF LUMBAR REGION WITHOUT NEUROGENIC CLAUDICATION: ICD-10-CM

## 2022-01-19 PROCEDURE — 1101F PT FALLS ASSESS-DOCD LE1/YR: CPT | Performed by: PHYSICIAN ASSISTANT

## 2022-01-19 PROCEDURE — G8419 CALC BMI OUT NRM PARAM NOF/U: HCPCS | Performed by: PHYSICIAN ASSISTANT

## 2022-01-19 PROCEDURE — G8536 NO DOC ELDER MAL SCRN: HCPCS | Performed by: PHYSICIAN ASSISTANT

## 2022-01-19 PROCEDURE — 99204 OFFICE O/P NEW MOD 45 MIN: CPT | Performed by: PHYSICIAN ASSISTANT

## 2022-01-19 PROCEDURE — G9717 DOC PT DX DEP/BP F/U NT REQ: HCPCS | Performed by: PHYSICIAN ASSISTANT

## 2022-01-19 PROCEDURE — 3017F COLORECTAL CA SCREEN DOC REV: CPT | Performed by: PHYSICIAN ASSISTANT

## 2022-01-19 PROCEDURE — G8756 NO BP MEASURE DOC: HCPCS | Performed by: PHYSICIAN ASSISTANT

## 2022-01-19 PROCEDURE — G8427 DOCREV CUR MEDS BY ELIG CLIN: HCPCS | Performed by: PHYSICIAN ASSISTANT

## 2022-01-19 RX ORDER — NORTRIPTYLINE HYDROCHLORIDE 50 MG/1
CAPSULE ORAL
COMMUNITY
Start: 2021-11-12

## 2022-01-19 RX ORDER — DICLOFENAC SODIUM 75 MG/1
75 TABLET, DELAYED RELEASE ORAL 2 TIMES DAILY WITH MEALS
Qty: 60 TABLET | Refills: 1 | Status: SHIPPED | OUTPATIENT
Start: 2022-01-19 | End: 2022-03-24 | Stop reason: ALTCHOICE

## 2022-01-19 NOTE — PROGRESS NOTES
Joaquín Pelaez (: 1950) is a 80 y.o. male, new patient, here for evaluation of the following chief complaint(s):  No chief complaint on file. SUBJECTIVE/OBJECTIVE:  Joaquín Pelaez (: 1950) is a 80 y.o. male. Patient presents for evaluation of lumbar back pain which has been worsening the last 2 years. Patient states he has been an avid runner his entire life until about 1 to 2 years ago when he had to stop running because it was causing pain. Patient's primary concern is numbness and intermittent weakness in the right leg. Patient describes stinging and throbbing pain which is constant in nature. Patient states symptoms are worse with prolonged sitting, driving, stairs and exercise. Patient describes numbness in the posterior thigh and buttock and lower leg. Patient was recently placed on prednisone by his primary care physician Dr. Min Lombardo and states that his symptoms have significantly improved. Patient typically takes [facility] powder for pain relief and Tylenol and states that his pain level has been as high as a 9/10 prior to starting the steroids. Patient denies saddle paresthesia/anesthesia. Allergies   Allergen Reactions    Crestor [Rosuvastatin] Myalgia    Lipitor [Atorvastatin] Myalgia    Pravastatin Myalgia    Sulfa (Sulfonamide Antibiotics) Hives       Current Outpatient Medications   Medication Sig    diclofenac EC (VOLTAREN) 75 mg EC tablet Take 1 Tablet by mouth two (2) times daily (with meals). Indications: PRN pain    predniSONE (DELTASONE) 20 mg tablet Take 60 mg by mouth daily (with breakfast).  escitalopram oxalate (LEXAPRO) 10 mg tablet Take 1 Tablet by mouth daily.  amLODIPine (NORVASC) 5 mg tablet Take 1 Tablet by mouth daily. (Patient not taking: Reported on 2021)    ezetimibe (ZETIA) 10 mg tablet TAKE 1 TABLET BY MOUTH EVERY DAY    baclofen (LIORESAL) 10 mg tablet Take 1 Tablet by mouth.     hydroCHLOROthiazide (HYDRODIURIL) 25 mg tablet Take 1 Tablet by mouth.  metoprolol succinate (TOPROL-XL) 50 mg XL tablet TAKE 1 TABLET BY MOUTH EVERY DAY    olmesartan (BENICAR) 40 mg tablet Take 1 Tablet by mouth.  sildenafil citrate (VIAGRA) 100 mg tablet Take 1 Tablet by mouth as needed for Erectile Dysfunction.  omega 3-dha-epa-fish oil (Fish Oil) 100-160-1,000 mg cap 1 Tab.  multivit-min/FA/lycopen/lutein (CENTRUM SILVER MEN PO) Take  by mouth.  pitavastatin calcium (LIVALO) 2 mg tablet Take 1 Tab by mouth daily.  brimonidine-timolol (COMBIGAN) 0.2-0.5 % drop ophthalmic solution 1 drop every twelve (12) hours.  latanoprost (XALATAN) 0.005 % ophthalmic solution Administer 1 drop to both eyes nightly. No current facility-administered medications for this visit. Social History     Socioeconomic History    Marital status:      Spouse name: Not on file    Number of children: Not on file    Years of education: Not on file    Highest education level: Not on file   Occupational History    Not on file   Tobacco Use    Smoking status: Former Smoker    Smokeless tobacco: Never Used   Vaping Use    Vaping Use: Never used   Substance and Sexual Activity    Alcohol use: Yes     Comment: occasional    Drug use: No    Sexual activity: Yes     Partners: Female   Other Topics Concern    Not on file   Social History Narrative    Medical History: Primary hypertensionDepressive disordernormal head CT May 15, 2007Mild aortic regurgitationDiastolic dysfunctionnormal left    ventricular systolic function ejection fraction 50% echocardiogram October 13, 2011negative stress w all motion study June 4, 2001 ejection    fraction 48%GlaucomaDecember 31, 2013 CT abdomen and pelvis degenerative changes lumbar spine no acute process        Surgical History: hernia repair January 11, 2012colon[facility]        Hospitalization/Major Diagnostic Procedure: Denies Past Hospitalization        Family History:  Mother: alive, hypertension,Father:  79 yrs, chfSister(s): aliveBrother(s): aliveDaughter(s): aliveSon(s):aliveChildren: aliveAdopted: deceased2 brother(s) , 2 sister(s) . 1 son(s) , 1 daughter(s)     . Social History: Alcohol Use Patient does not use alcohol. Smoking Status Patient is a never smoker. Exercise: running. Marital Status: . Lives w ith: spouse - recently had bunion surgery - eric. 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 Education/School: high school. - [employer]. [place]     Social Determinants of Health     Financial Resource Strain:     Difficulty of Paying Living Expenses: Not on file   Food Insecurity:     Worried About [address] in the Last Year: Not on file    Vasyl of Food in the Last Year: Not on file   Transportation Needs:     Lack of Transportation (Medical): Not on file    Lack of Transportation (Non-Medical):  Not on file   Physical Activity:     Days of Exercise per Week: Not on file    Minutes of Exercise per Session: Not on file   Stress:     Feeling of Stress : Not on file   Social Connections:     Frequency of Communication with Friends and Family: Not on file    Frequency of Social Gatherings with Friends and Family: Not on file    Attends [employer] Services: Not on file    Active Member of [address] [place] [address] or Organizations: Not on file    Attends Club or Organization Meetings: Not on file    Marital Status: Not on file   Intimate Partner Violence:     Fear of Current or Ex-Partner: Not on file    Emotionally Abused: Not on file    Physically Abused: Not on file    Sexually Abused: Not on file   Housing Stability:     Unable to Pay for Housing in the Last Year: Not on file    Number of [place] in the Last Year: Not on file    Unstable Housing in the Last Year: Not on file       Past Surgical History:   Procedure Laterality Date    HX COLONOSCOPY      [place] ABDOMEN SURGERY PROC UNLISTED bilateral hernia repair       Family History   Problem Relation Age of Onset    Heart Disease Father                REVIEW OF SYSTEMS:  ROS    Patient denies any recent fever, chills, nausea, vomiting, chest pain, or shortness of breath. Vitals: There were no vitals taken for this visit. There is no height or weight on file to calculate BMI. PHYSICAL EXAM:    The patient is alert and oriented x3 and in no acute distress. Patient ambulates with a normal gait without gait aids. Sensory testing in all major nerve distributions in the lower extremities is intact in the left lower extremity. Sensory testing of the major nerve groups in the right lower extremity demonstrate diminished sensation in the right lateral thigh, entire calf and foot. Manual motor testing of the major muscle groups of the lower extremities including dorsiflexion/EHL/plantarflexion, knee flexion/extension, hip flexion/extension/abduction/adduction is intact and symmetric in the bilateral lower extremities. Deep tendon reflexes +2/4 and symmetric in the bilateral knees and ankles. Negative straight leg raise bilaterally. No evidence of clonus bilaterally. Babinski's downgoing and symmetric bilaterally. Distal pulses 2+ and symmetric bilaterally. There is no tenderness to palpation of the thoracic, lumbar or sacral regions. IMAGING:    XR Results (most recent):  Results from Appointment encounter on 01/19/22    XR SPINE LUMB MIN 4 V    Narrative  AP, lateral, flexion and extension digital view radiographs of the lumbar spine obtained in the office today and was reviewed demonstrate grade 1 anterolisthesis L4 on L5 of 4 mm is mobile on flexion/extension views. There is moderate to severe facet arthrosis L4-5 and  L5-S1 with encroachment on the foramen. There is no evidence of fracture, lytic lesion or acute bony abnormality.         Orders Placed This Encounter    XR SPINE LUMB MIN 4 V     1c     Standing Status:   Future Number of Occurrences:   1     Standing Expiration Date:   1/20/2023    REFERRAL TO PHYSICAL THERAPY     Referral Priority:   Routine     Referral Type:   PT/OT/ST     Requested Specialty:   Physical Therapy     Number of Visits Requested:   1    diclofenac EC (VOLTAREN) 75 mg EC tablet     Sig: Take 1 Tablet by mouth two (2) times daily (with meals). Indications: PRN pain     Dispense:  60 Tablet     Refill:  1          ASSESSMENT/PLAN:      1. Lumbar back pain  -     XR SPINE LUMB MIN 4 V; Future  -     REFERRAL TO PHYSICAL THERAPY  2. Spinal stenosis of lumbar region without neurogenic claudication  -     REFERRAL TO PHYSICAL THERAPY  3. Spondylolisthesis of lumbar region  -     REFERRAL TO PHYSICAL THERAPY        Below is the assessment and plan developed based on review of pertinent history, physical exam, labs, studies, and medications. Have discussed the diagnosis and radiographic findings at length and answered all patient questions to his satisfaction. Have sent a prescription for NSAIDs electronically to the patient's preferred pharmacy. Have provided the patient with a prescription for outpatient physical therapy for core strengthening, modalities and instruction in a home exercise program. Have asked the patient to continue walking program as tolerated. Will plan on seeing the patient back for reevaluation in 4 to 6 weeks time should symptoms persist or worsen. The patient understands and agrees to the treatment plan as outlined above. No follow-ups on file. Dr. Rusty Hooker was available for immediate consultation as needed. An electronic signature was used to authenticate this note.   -- Arcadio Rubio PA-C

## 2022-03-16 ENCOUNTER — OFFICE VISIT (OUTPATIENT)
Dept: ORTHOPEDIC SURGERY | Age: 72
End: 2022-03-16
Payer: MEDICARE

## 2022-03-16 DIAGNOSIS — M48.061 SPINAL STENOSIS OF LUMBAR REGION WITHOUT NEUROGENIC CLAUDICATION: ICD-10-CM

## 2022-03-16 DIAGNOSIS — M54.16 LUMBAR RADICULOPATHY: ICD-10-CM

## 2022-03-16 DIAGNOSIS — M54.50 LUMBAR BACK PAIN: Primary | ICD-10-CM

## 2022-03-16 DIAGNOSIS — M47.26 OTHER SPONDYLOSIS WITH RADICULOPATHY, LUMBAR REGION: ICD-10-CM

## 2022-03-16 PROCEDURE — 1101F PT FALLS ASSESS-DOCD LE1/YR: CPT | Performed by: PHYSICIAN ASSISTANT

## 2022-03-16 PROCEDURE — 99213 OFFICE O/P EST LOW 20 MIN: CPT | Performed by: PHYSICIAN ASSISTANT

## 2022-03-16 PROCEDURE — G9717 DOC PT DX DEP/BP F/U NT REQ: HCPCS | Performed by: PHYSICIAN ASSISTANT

## 2022-03-16 PROCEDURE — G8536 NO DOC ELDER MAL SCRN: HCPCS | Performed by: PHYSICIAN ASSISTANT

## 2022-03-16 PROCEDURE — 3017F COLORECTAL CA SCREEN DOC REV: CPT | Performed by: PHYSICIAN ASSISTANT

## 2022-03-16 PROCEDURE — G8427 DOCREV CUR MEDS BY ELIG CLIN: HCPCS | Performed by: PHYSICIAN ASSISTANT

## 2022-03-16 PROCEDURE — G8419 CALC BMI OUT NRM PARAM NOF/U: HCPCS | Performed by: PHYSICIAN ASSISTANT

## 2022-03-16 PROCEDURE — G8756 NO BP MEASURE DOC: HCPCS | Performed by: PHYSICIAN ASSISTANT

## 2022-03-17 NOTE — PROGRESS NOTES
Gilford Etienne (: 1950) is a 80 y.o. male, established patient, here for evaluation of the following chief complaint(s):  No chief complaint on file. SUBJECTIVE/OBJECTIVE:    Gilford Etienne (: 1950) is a 80 y.o. male. Patient returns for evaluation of lumbar back pain with radiation to the right leg. Patient states symptoms have been worsening over the last 2 years. Patient states he has been an avid runner all his life until about 2 years ago when he had stopped running because he was worsening his symptoms. Patient's primary concern is numbness and tingling in the right leg. She describes intermittent right leg weakness. Patient describes stinging throbbing pain which is constant in nature. States symptoms worse with prolonged sitting, driving, stairs and exercise. Has numbness and tingling in the posterior thigh and buttock and lower leg. Patient has had steroids, NSAIDs and had been referred to outpatient physical therapy without lasting benefit. Patient states he continues to do home exercise program routinely. States he continues to walk 1 or 2 miles daily. Patient has been taking diclofenac and rates his average daily pain level is an 8/10. Patient denies saddle paresthesia/anesthesia. Allergies   Allergen Reactions    Crestor [Rosuvastatin] Myalgia    Lipitor [Atorvastatin] Myalgia    Pravastatin Myalgia    Sulfa (Sulfonamide Antibiotics) Hives       Current Outpatient Medications   Medication Sig    diclofenac EC (VOLTAREN) 75 mg EC tablet Take 1 Tablet by mouth two (2) times daily (with meals). Indications: PRN pain    nortriptyline (PAMELOR) 50 mg capsule     predniSONE (DELTASONE) 20 mg tablet Take 60 mg by mouth daily (with breakfast).  escitalopram oxalate (LEXAPRO) 10 mg tablet Take 1 Tablet by mouth daily.  amLODIPine (NORVASC) 5 mg tablet Take 1 Tablet by mouth daily.  (Patient not taking: Reported on 2021)    ezetimibe (ZETIA) 10 mg tablet TAKE 1 TABLET BY MOUTH EVERY DAY    baclofen (LIORESAL) 10 mg tablet Take 1 Tablet by mouth.  hydroCHLOROthiazide (HYDRODIURIL) 25 mg tablet Take 1 Tablet by mouth.  metoprolol succinate (TOPROL-XL) 50 mg XL tablet TAKE 1 TABLET BY MOUTH EVERY DAY    olmesartan (BENICAR) 40 mg tablet Take 1 Tablet by mouth.  sildenafil citrate (VIAGRA) 100 mg tablet Take 1 Tablet by mouth as needed for Erectile Dysfunction.  omega 3-dha-epa-fish oil (Fish Oil) 100-160-1,000 mg cap 1 Tab.  multivit-min/FA/lycopen/lutein (CENTRUM SILVER MEN PO) Take  by mouth.  pitavastatin calcium (LIVALO) 2 mg tablet Take 1 Tab by mouth daily.  brimonidine-timolol (COMBIGAN) 0.2-0.5 % drop ophthalmic solution 1 drop every twelve (12) hours.  latanoprost (XALATAN) 0.005 % ophthalmic solution Administer 1 drop to both eyes nightly. No current facility-administered medications for this visit.        Social History     Socioeconomic History    Marital status:      Spouse name: Not on file    Number of children: Not on file    Years of education: Not on file    Highest education level: Not on file   Occupational History    Not on file   Tobacco Use    Smoking status: Former Smoker    Smokeless tobacco: Never Used   Vaping Use    Vaping Use: Never used   Substance and Sexual Activity    Alcohol use: Yes     Comment: occasional    Drug use: No    Sexual activity: Yes     Partners: Female   Other Topics Concern    Not on file   Social History Narrative    Medical History: Primary hypertensionDepressive disordernormal head CT May 15, 2007Mild aortic regurgitationDiastolic dysfunctionnormal left    ventricular systolic function ejection fraction 50% echocardiogram October 13, 2011negative stress w all motion study June 4, 2001 ejection    fraction 48%GlaucomaDecember 31, 2013 CT abdomen and pelvis degenerative changes lumbar spine no acute process        Surgical History: hernia repair January 11, [facility] 2014        Hospitalization/Major Diagnostic Procedure: Denies Past Hospitalization        Family History: Mother: alive, hypertension,Father:  79 yrs, chfSister(s): aliveBrother(s): aliveDaughter(s): aliveSon(s):aliveChildren: aliveAdopted: deceased2 brother(s) , 2 sister(s) . 1 son(s) , 1 daughter(s)     . Social History: Alcohol Use Patient does not use alcohol. Smoking Status Patient is a never smoker. Exercise: running. Marital Status: . Lives w ith: spouse - recently had bunion surgery - eric. 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 Education/School: high school. - [place]. [place]     Social Determinants of Health     Financial Resource Strain:     Difficulty of Paying Living Expenses: Not on file   Food Insecurity:     Worried About [employer] in the Last Year: Not on file    Vasyl [employer] Food in the Last Year: Not on file   Transportation Needs:     Lack of Transportation (Medical): Not on file    Lack of Transportation (Non-Medical):  Not on file   Physical Activity:     Days of Exercise per Week: Not on file    Minutes of Exercise per Session: Not on file   Stress:     Feeling of Stress : Not on file   Social Connections:     Frequency of Communication with Friends and Family: Not on file    Frequency of Social Gatherings with Friends and Family: Not on file    Attends Zoroastrian Services: Not on file    Active Member of Clubs or Organizations: Not on file    Attends Club or Organization Meetings: Not on file    Marital Status: Not on file   Intimate Partner Violence:     Fear of Current or Ex-Partner: Not on file    Emotionally Abused: Not on file    Physically Abused: Not on file    Sexually Abused: Not on file   Housing Stability:     Unable to Pay for Housing in the Last Year: Not on file    Number of [place] in the Last Year: Not on file    Unstable Housing in the Last Year: Not on file       Past Surgical History:   Procedure Laterality Date    HX COLONOSCOPY      [place] ABDOMEN SURGERY PROC UNLISTED      bilateral hernia repair       Family History   Problem Relation Age of Onset    Heart Disease Father                REVIEW OF SYSTEMS:    Patient denies any recent fever, chills, nausea, vomiting, chest pain, abdominal pain, blurred vision, dizziness or shortness of breath. Vitals: There were no vitals taken for this visit. There is no height or weight on file to calculate BMI. PHYSICAL EXAM:    The patient is alert and oriented x3 and in no acute distress. Patient ambulates with a normal gait without gait aids. Sensory testing in all major nerve distributions in the lower extremities is intact and symmetric with the exception of the right lateral thigh which is diminished versus the left which is improved from his last visit. Manual motor testing of the major muscle groups of the lower extremities including dorsiflexion/EHL/ plantarflexion, knee flexion/extension, hip flexion/extension/abduction/ adduction is intact and symmetric in the bilateral lower extremities. Deep tendon reflexes +2/4 and symmetric in the bilateral knees and ankles. Hip range of motion is pain-free bilaterally. Negative straight leg raise bilaterally. No evidence of clonus bilaterally. Babinski's downgoing and symmetric bilaterally. Distal pulses intact and symmetric bilaterally. There is no tenderness to palpation of the thoracic, lumbar or sacral regions. IMAGING:    Results from Appointment encounter on 01/19/22    XR SPINE LUMB MIN 4 V    Narrative  AP, lateral, flexion and extension digital view radiographs of the lumbar spine obtained in the office today and was reviewed demonstrate grade 1 anterolisthesis L4 on L5 of 4 mm is mobile on flexion/extension views.   There is moderate to severe facet arthrosis L4-5 and  L5-S1 with encroachment on the foramen. There is no evidence of fracture, lytic lesion or acute bony abnormality. Orders Placed This Encounter    MRI LUMB SPINE WO CONT     Standing Status:   Future     Standing Expiration Date:   4/16/2023          ASSESSMENT/PLAN:      1. Lumbar back pain  -     MRI LUMB SPINE WO CONT; Future  2. Spinal stenosis of lumbar region without neurogenic claudication  -     MRI LUMB SPINE WO CONT; Future  3. Lumbar radiculopathy  -     MRI LUMB SPINE WO CONT; Future  4. Other spondylosis with radiculopathy, lumbar region        Below is the assessment and plan developed based on review of pertinent history, physical exam, labs, studies, and medications. Have discussed the diagnosis and radiographic findings at length and answered all patient questions to his questions to their satisfaction. Given the patient's ongoing pain and sensory deficits despite conservative management have referred the patient for MRI lumbar to assess for severe stenosis/disc herniation. We will plan on seeing the patient back for reevaluation and further treatment recommendations once imaging results are available for review. The patient understands and agrees to the treatment plan as outlined above. No follow-ups on file. Dr. Abran Cr was available for immediate consultation as needed. An electronic signature was used to authenticate this note.   -- Tiffanie Bruno PA-C

## 2022-03-18 PROBLEM — F32.A DEPRESSION: Status: ACTIVE | Noted: 2021-12-06

## 2022-03-18 PROBLEM — K40.30 INGUINAL HERNIA OF RIGHT SIDE WITH OBSTRUCTION AND WITHOUT GANGRENE: Status: ACTIVE | Noted: 2021-11-08

## 2022-03-19 PROBLEM — H93.11 TINNITUS OF RIGHT EAR: Status: ACTIVE | Noted: 2018-06-14

## 2022-03-19 PROBLEM — M50.122 CERVICAL DISC DISORDER AT C5-C6 LEVEL WITH RADICULOPATHY: Status: ACTIVE | Noted: 2020-07-15

## 2022-03-19 PROBLEM — M54.16 LUMBAR RADICULOPATHY: Status: ACTIVE | Noted: 2022-01-17

## 2022-03-21 ENCOUNTER — HOSPITAL ENCOUNTER (OUTPATIENT)
Dept: MRI IMAGING | Age: 72
Discharge: HOME | End: 2022-03-21
Attending: PHYSICIAN ASSISTANT
Payer: MEDICARE

## 2022-03-21 DIAGNOSIS — M48.061 SPINAL STENOSIS OF LUMBAR REGION WITHOUT NEUROGENIC CLAUDICATION: ICD-10-CM

## 2022-03-21 DIAGNOSIS — M54.16 LUMBAR RADICULOPATHY: ICD-10-CM

## 2022-03-21 DIAGNOSIS — M54.50 LUMBAR BACK PAIN: ICD-10-CM

## 2022-03-21 PROCEDURE — 72148 MRI LUMBAR SPINE W/O DYE: CPT

## 2022-03-24 ENCOUNTER — OFFICE VISIT (OUTPATIENT)
Dept: ORTHOPEDIC SURGERY | Age: 72
End: 2022-03-24
Payer: MEDICARE

## 2022-03-24 VITALS — HEIGHT: 68 IN | BODY MASS INDEX: 24.71 KG/M2 | WEIGHT: 163 LBS

## 2022-03-24 DIAGNOSIS — M54.50 LUMBAR BACK PAIN: Primary | ICD-10-CM

## 2022-03-24 DIAGNOSIS — M43.16 SPONDYLOLISTHESIS OF LUMBAR REGION: ICD-10-CM

## 2022-03-24 DIAGNOSIS — M54.16 LUMBAR RADICULOPATHY: ICD-10-CM

## 2022-03-24 DIAGNOSIS — M51.26 PROTRUSION OF LUMBAR INTERVERTEBRAL DISC: ICD-10-CM

## 2022-03-24 DIAGNOSIS — M48.061 SPINAL STENOSIS OF LUMBAR REGION WITHOUT NEUROGENIC CLAUDICATION: ICD-10-CM

## 2022-03-24 PROCEDURE — 3017F COLORECTAL CA SCREEN DOC REV: CPT | Performed by: PHYSICIAN ASSISTANT

## 2022-03-24 PROCEDURE — G8427 DOCREV CUR MEDS BY ELIG CLIN: HCPCS | Performed by: PHYSICIAN ASSISTANT

## 2022-03-24 PROCEDURE — G8420 CALC BMI NORM PARAMETERS: HCPCS | Performed by: PHYSICIAN ASSISTANT

## 2022-03-24 PROCEDURE — 1101F PT FALLS ASSESS-DOCD LE1/YR: CPT | Performed by: PHYSICIAN ASSISTANT

## 2022-03-24 PROCEDURE — G9717 DOC PT DX DEP/BP F/U NT REQ: HCPCS | Performed by: PHYSICIAN ASSISTANT

## 2022-03-24 PROCEDURE — G8756 NO BP MEASURE DOC: HCPCS | Performed by: PHYSICIAN ASSISTANT

## 2022-03-24 PROCEDURE — G8536 NO DOC ELDER MAL SCRN: HCPCS | Performed by: PHYSICIAN ASSISTANT

## 2022-03-24 PROCEDURE — 99214 OFFICE O/P EST MOD 30 MIN: CPT | Performed by: PHYSICIAN ASSISTANT

## 2022-03-24 RX ORDER — DICLOFENAC SODIUM 75 MG/1
75 TABLET, DELAYED RELEASE ORAL 2 TIMES DAILY WITH MEALS
Qty: 60 TABLET | Refills: 1 | Status: SHIPPED | OUTPATIENT
Start: 2022-03-24

## 2022-03-24 RX ORDER — FAMOTIDINE 20 MG/1
20 TABLET, FILM COATED ORAL 2 TIMES DAILY
Qty: 60 TABLET | Refills: 1 | Status: SHIPPED | OUTPATIENT
Start: 2022-03-24 | End: 2022-04-15

## 2022-03-24 NOTE — PROGRESS NOTES
Joaquín Pelaez (: 1950) is a 80 y.o. male, established patient, here for evaluation of the following chief complaint(s):  Back Pain and Leg Pain         SUBJECTIVE/OBJECTIVE:    Joaquín Pelaez (: 1950) is a 80 y.o. male. This is a very pleasant gentleman who returns for follow-up evaluation of lumbar back pain with radiation to the right lower extremity. Patient states symptoms have been worsening in severity over the last 2 years. Patient's primary concern is numbness and tingling in the right posterior lateral thigh and lower leg. Patient states symptoms are worse with prolonged sitting, driving stairs and attempted exercise. Patient has had steroids and outpatient physical therapy without lasting benefit. Patient is currently taking diclofenac 75 mg twice daily and states that over the last 2 weeks or so his pain has improved. Patient states he continues to walk 1 to 2 miles daily for exercise. States he is able to sleep on his right side without discomfort now. Patient states his pain level today is rated as a 7/10, which is an overall improvement. The patient denies lower extremity weakness. Patient denies saddle paresthesia/anesthesia. Allergies   Allergen Reactions    Crestor [Rosuvastatin] Myalgia    Lipitor [Atorvastatin] Myalgia    Pravastatin Myalgia    Sulfa (Sulfonamide Antibiotics) Hives       Current Outpatient Medications   Medication Sig    diclofenac EC (VOLTAREN) 75 mg EC tablet Take 1 Tablet by mouth two (2) times daily (with meals). Start this medication after completing steroid pack. Indications: PRN pain    famotidine (PEPCID) 20 mg tablet Take 1 Tablet by mouth two (2) times a day. Indications: treatment to prevent heartburn, take with diclofenac    nortriptyline (PAMELOR) 50 mg capsule     escitalopram oxalate (LEXAPRO) 10 mg tablet Take 1 Tablet by mouth daily.  amLODIPine (NORVASC) 5 mg tablet Take 1 Tablet by mouth daily.  (Patient not taking: Reported on 12/6/2021)    ezetimibe (ZETIA) 10 mg tablet TAKE 1 TABLET BY MOUTH EVERY DAY    hydroCHLOROthiazide (HYDRODIURIL) 25 mg tablet Take 1 Tablet by mouth.  metoprolol succinate (TOPROL-XL) 50 mg XL tablet TAKE 1 TABLET BY MOUTH EVERY DAY    olmesartan (BENICAR) 40 mg tablet Take 1 Tablet by mouth.  sildenafil citrate (VIAGRA) 100 mg tablet Take 1 Tablet by mouth as needed for Erectile Dysfunction.  omega 3-dha-epa-fish oil (Fish Oil) 100-160-1,000 mg cap 1 Tab.  multivit-min/FA/lycopen/lutein (CENTRUM SILVER MEN PO) Take  by mouth.  pitavastatin calcium (LIVALO) 2 mg tablet Take 1 Tab by mouth daily.  brimonidine-timolol (COMBIGAN) 0.2-0.5 % drop ophthalmic solution 1 drop every twelve (12) hours.  latanoprost (XALATAN) 0.005 % ophthalmic solution Administer 1 drop to both eyes nightly. No current facility-administered medications for this visit.        Social History     Socioeconomic History    Marital status:      Spouse name: Not on file    Number of children: Not on file    Years of education: Not on file    Highest education level: Not on file   Occupational History    Not on file   Tobacco Use    Smoking status: Former Smoker    Smokeless tobacco: Never Used   Vaping Use    Vaping Use: Never used   Substance and Sexual Activity    Alcohol use: Yes     Comment: occasional    Drug use: No    Sexual activity: Yes     Partners: Female   Other Topics Concern    Not on file   Social History Narrative    Medical History: Primary hypertensionDepressive disordernormal head CT May 15, 2007Mild aortic regurgitationDiastolic dysfunctionnormal left    ventricular systolic function ejection fraction 50% echocardiogram October 13, 2011negative stress w all motion study June 4, 2001 ejection    fraction 48%GlaucomaDecember 31, 2013 CT abdomen and pelvis degenerative changes lumbar spine no acute process        Surgical History: hernia repair January 11, 2012colonoscopy August 12, 2005[facility] 2014        Hospitalization/Major Diagnostic Procedure: Denies Past Hospitalization        Family History: Mother: alive, hypertension,Father:  79 yrs, chfSister(s): aliveBrother(s): aliveDaughter(s): aliveSon(s):aliveChildren: aliveAdopted: deceased2 brother(s) , 2 sister(s) . 1 son(s) , 1 daughter(s)     . Social History: Alcohol Use Patient does not use alcohol. Smoking Status Patient is a never smoker. Exercise: running. Marital Status: . Lives w ith: spouse - recently had bunion surgery - eric. 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 Education/School: high school. - [place]. [place]     Social Determinants of Health     Financial Resource Strain:     Difficulty of Paying Living Expenses: Not on file   Food Insecurity:     Worried About [employer] in the Last Year: Not on file    [employer] in the Last Year: Not on file   Transportation Needs:     Lack of Transportation (Medical): Not on file    Lack of Transportation (Non-Medical):  Not on file   Physical Activity:     Days of Exercise per Week: Not on file    Minutes of Exercise per Session: Not on file   Stress:     Feeling of Stress : Not on file   Social Connections:     Frequency of Communication with Friends and Family: Not on file    Frequency of Social Gatherings with Friends and Family: Not on file    Attends Yazidism Services: Not on file    Active Member of Clubs or Organizations: Not on file    Attends Club or Organization Meetings: Not on file    Marital Status: Not on file   Intimate Partner Violence:     Fear of Current or Ex-Partner: Not on file    Emotionally Abused: Not on file    Physically Abused: Not on file    Sexually Abused: Not on file   Housing Stability:     Unable to Pay for Housing in the Last Year: Not on file    Number of [place] in the Last Year: Not on file    Unstable Housing in the Last Year: Not on file       Past Surgical History:   Procedure Laterality Date    HX COLONOSCOPY      [place] ABDOMEN SURGERY PROC UNLISTED      bilateral hernia repair       Family History   Problem Relation Age of Onset    Heart Disease Father                REVIEW OF SYSTEMS:    Patient denies any recent fever, chills, nausea, vomiting, chest pain, abdominal pain, blurred vision, dizziness or shortness of breath. Vitals:    Ht 5' 8\" (1.727 m)   Wt 163 lb (73.9 kg)   BMI 24.78 kg/m²    Body mass index is 24.78 kg/m². PHYSICAL EXAM:    The patient is alert and oriented x3 and in no acute distress. Patient ambulates with a normal gait without gait aids. Sensory testing in all major nerve distributions in the lower extremities is intact and symmetric with the exception of the lateral calf and lateral thigh on the right which are diminished versus the left. Manual motor testing of the major muscle groups of the lower extremities including dorsiflexion/EHL/ plantarflexion, knee flexion/extension, hip flexion/extension/abduction/ adduction is intact and symmetric in the bilateral lower extremities. Deep tendon reflexes +2/4 and symmetric in the bilateral knees and ankles. Hip range of motion is pain-free bilaterally. Negative straight leg raise bilaterally. No evidence of clonus bilaterally. Babinski's downgoing and symmetric bilaterally. Distal pulses intact and symmetric bilaterally. There is no tenderness to palpation of the thoracic, lumbar or sacral regions. IMAGING:    Results from Appointment encounter on 01/19/22    XR SPINE LUMB MIN 4 V    Narrative  AP, lateral, flexion and extension digital view radiographs of the lumbar spine obtained in the office today and was reviewed demonstrate grade 1 anterolisthesis L4 on L5 of 4 mm is mobile on flexion/extension views. There is moderate to severe facet arthrosis L4-5 and  L5-S1 with encroachment on the foramen.   There is no evidence of fracture, lytic lesion or acute bony abnormality. MRI Results (most recent):  Results from [place] encounter on 03/21/22    MRI LUMB SPINE WO CONT    Narrative  EXAM: MRI LUMB SPINE WO CONT    INDICATION: . Low back pain, unspecified    Exam: MRI of the lumbar spine. Sequences include sagittal and axial T1 and  T2-weighted images. Sagittal STIR. Coronal T2    Comparisons: 1/19/2022    Contrast: None. Findings: There is 2 to 3 mm anterolisthesis of L4 on L5. Minimal endplate  degenerative change. No fracture or marrow replacement. Cord terminus is within  normal limits. Paraspinous soft tissues are within normal limits. T12-L1: No stenosis    L1-L2: No stenosis    L2-L3: No stenosis    L3-L4: There is disc height loss with degeneration of this disc. There is a  diffuse bulge. Small left paracentral extrusion extending cranially. The lateral  facet arthropathy with thickening of the ligamentum flavum. Canal stenosis is  moderate with narrowing of the left greater than right subarticular zones. Mild  narrowing of the left greater than right foraminal    L4-L5: Disc height loss and degeneration of this disc. Diffuse bulge with  broad-based extrusion extending cranially. Bilateral facet arthropathy with  thickening of the ligamentum flavum. Canal stenosis is moderate narrowing of the  left greater than right subarticular zones. Mild to moderate narrowing of the  foramen    L5-S1: Facet arthropathy without disc bulge or stenosis    Impression  1. Multilevel degenerative change detailed by level above most significant at  L3-4 and L4-5          Orders Placed This Encounter    diclofenac EC (VOLTAREN) 75 mg EC tablet     Sig: Take 1 Tablet by mouth two (2) times daily (with meals). Start this medication after completing steroid pack. Indications: PRN pain     Dispense:  60 Tablet     Refill:  1    famotidine (PEPCID) 20 mg tablet     Sig: Take 1 Tablet by mouth two (2) times a day.  Indications: treatment to prevent heartburn, take with diclofenac     Dispense:  60 Tablet     Refill:  1          ASSESSMENT/PLAN:      1. Lumbar back pain  2. Lumbar radiculopathy  3. Protrusion of lumbar intervertebral disc  4. Spinal stenosis of lumbar region without neurogenic claudication        Below is the assessment and plan developed based on review of pertinent history, physical exam, labs, studies, and medications. Have discussed the patient's diagnosis and radiographic findings at length and have answered all patient questions to his satisfaction. Have sent a prescription for NSAIDs and famotidine which were sent electronically to the patient's preferred pharmacy. Have discussed NSAID toxicity in particular related to GI, renal and blood pressure effects. Should the patient choose to take NSAIDs chronically he will need to coordinate with his PCP Dr. Yovani Martinez for routine lab testing, which is not done in this office. Have encouraged him to discuss taking NSAIDs with his PCP. Have encouraged patient to continue home exercise program taught him by outpatient physical therapy and to continue walking program as tolerated. The patient wishes to defer epidural steroid injections at this time as he feels like his overall status is improved. This is a reasonable approach. Should the patient wish to pursue injections in the next month or 2 have asked him to contact our office and we can do this by phone, without follow-up. Will plan on seeing the patient back for reevaluation on an as-needed basis. The patient understands and agrees to the treatment plan as outlined above. No follow-ups on file. Dr. Yina Pennington was available for immediate consultation as needed. An electronic signature was used to authenticate this note.   -- Riley Lo PA-C

## 2022-03-24 NOTE — LETTER
3/24/2022    Patient: Bernarda Estrada   YOB: 1950   Date of Visit: 3/24/2022     Shakir Trent MD  [facility]  [address] [facility]  [address] [facility] [address]    Dear Shakir Trent MD,      Thank you for referring Mr. Bernarda Estrada to [facility] for evaluation. My notes for this consultation are attached. If you have questions, please do not hesitate to call me. I look forward to following your patient along with you.       Sincerely,    Scarlet Vega PA-C

## 2022-04-05 ENCOUNTER — TELEPHONE (OUTPATIENT)
Dept: ORTHOPEDIC SURGERY | Age: 72
End: 2022-04-05

## 2022-04-06 ENCOUNTER — TELEPHONE (OUTPATIENT)
Dept: ORTHOPEDIC SURGERY | Age: 72
End: 2022-04-06

## 2022-04-06 DIAGNOSIS — M48.061 SPINAL STENOSIS OF LUMBAR REGION WITHOUT NEUROGENIC CLAUDICATION: ICD-10-CM

## 2022-04-06 DIAGNOSIS — M51.26 PROTRUSION OF LUMBAR INTERVERTEBRAL DISC: ICD-10-CM

## 2022-04-06 DIAGNOSIS — M54.50 LUMBAR BACK PAIN: Primary | ICD-10-CM

## 2022-04-06 DIAGNOSIS — M43.16 SPONDYLOLISTHESIS OF LUMBAR REGION: ICD-10-CM

## 2022-04-06 DIAGNOSIS — M54.16 LUMBAR RADICULOPATHY: ICD-10-CM

## 2022-04-11 ENCOUNTER — TELEPHONE (OUTPATIENT)
Dept: ORTHOPEDIC SURGERY | Age: 72
End: 2022-04-11

## 2022-04-11 NOTE — TELEPHONE ENCOUNTER
Received noticed from Chelsea [facility] Specialist patient did not want to schedule injection at this time.

## 2022-04-15 RX ORDER — FAMOTIDINE 20 MG/1
TABLET, FILM COATED ORAL
Qty: 60 TABLET | Refills: 1 | Status: SHIPPED | OUTPATIENT
Start: 2022-04-15

## 2022-05-17 ENCOUNTER — OFFICE VISIT (OUTPATIENT)
Dept: INTERNAL MEDICINE CLINIC | Age: 72
End: 2022-05-17
Payer: MEDICARE

## 2022-05-17 VITALS
BODY MASS INDEX: 25.73 KG/M2 | TEMPERATURE: 97.9 F | RESPIRATION RATE: 18 BRPM | HEART RATE: 51 BPM | HEIGHT: 68 IN | DIASTOLIC BLOOD PRESSURE: 83 MMHG | OXYGEN SATURATION: 98 % | WEIGHT: 169.8 LBS | SYSTOLIC BLOOD PRESSURE: 126 MMHG

## 2022-05-17 DIAGNOSIS — R79.9 ABNORMAL FINDING OF BLOOD CHEMISTRY, UNSPECIFIED: ICD-10-CM

## 2022-05-17 DIAGNOSIS — E78.5 DYSLIPIDEMIA: ICD-10-CM

## 2022-05-17 DIAGNOSIS — I51.89 DIASTOLIC DYSFUNCTION: ICD-10-CM

## 2022-05-17 DIAGNOSIS — K40.30 INGUINAL HERNIA OF RIGHT SIDE WITH OBSTRUCTION AND WITHOUT GANGRENE: ICD-10-CM

## 2022-05-17 DIAGNOSIS — M50.122 CERVICAL DISC DISORDER AT C5-C6 LEVEL WITH RADICULOPATHY: ICD-10-CM

## 2022-05-17 DIAGNOSIS — M51.26 PROTRUSION OF LUMBAR INTERVERTEBRAL DISC: ICD-10-CM

## 2022-05-17 DIAGNOSIS — R35.1 NOCTURIA: ICD-10-CM

## 2022-05-17 DIAGNOSIS — I10 PRIMARY HYPERTENSION: Primary | ICD-10-CM

## 2022-05-17 DIAGNOSIS — I35.1 AORTIC VALVE INSUFFICIENCY, ETIOLOGY OF CARDIAC VALVE DISEASE UNSPECIFIED: ICD-10-CM

## 2022-05-17 PROCEDURE — G8752 SYS BP LESS 140: HCPCS | Performed by: INTERNAL MEDICINE

## 2022-05-17 PROCEDURE — 99214 OFFICE O/P EST MOD 30 MIN: CPT | Performed by: INTERNAL MEDICINE

## 2022-05-17 PROCEDURE — G8419 CALC BMI OUT NRM PARAM NOF/U: HCPCS | Performed by: INTERNAL MEDICINE

## 2022-05-17 PROCEDURE — G8427 DOCREV CUR MEDS BY ELIG CLIN: HCPCS | Performed by: INTERNAL MEDICINE

## 2022-05-17 PROCEDURE — 3017F COLORECTAL CA SCREEN DOC REV: CPT | Performed by: INTERNAL MEDICINE

## 2022-05-17 PROCEDURE — G9717 DOC PT DX DEP/BP F/U NT REQ: HCPCS | Performed by: INTERNAL MEDICINE

## 2022-05-17 PROCEDURE — 1101F PT FALLS ASSESS-DOCD LE1/YR: CPT | Performed by: INTERNAL MEDICINE

## 2022-05-17 PROCEDURE — G8754 DIAS BP LESS 90: HCPCS | Performed by: INTERNAL MEDICINE

## 2022-05-17 PROCEDURE — G8536 NO DOC ELDER MAL SCRN: HCPCS | Performed by: INTERNAL MEDICINE

## 2022-05-17 RX ORDER — CARVEDILOL 12.5 MG/1
1 TABLET ORAL
COMMUNITY
Start: 2022-05-05

## 2022-05-17 RX ORDER — AMLODIPINE BESYLATE 5 MG/1
5 TABLET ORAL DAILY
Qty: 30 TABLET | Refills: 11 | Status: SHIPPED | OUTPATIENT
Start: 2022-05-17

## 2022-05-17 NOTE — PROGRESS NOTES
Caron Salinas is a 80 y.o. male    Chief Complaint   Patient presents with    Hypertension     1. Have you been to the ER, urgent care clinic since your last visit? Hospitalized since your last visit? No    2. Have you seen or consulted any other health care providers outside of the [address] [place] [address] since your last visit? Include any pap smears or colon screening.   No

## 2022-05-17 NOTE — PROGRESS NOTES
SPORTS MEDICINE AND PRIMARY CARE  Manoj Soares MD, [address] [address]  Phone:  [phone]  Fax: [phone]       Chief Complaint   Patient presents with    Hypertension   . SUBJECTIVE:    Jarett Paredes is a 80 y.o. male Patient returns today with a known history of primary hypertension, cervical myelopathy with radiculopathy, diastolic dysfunction, moderate AI, dyslipidemia, and is seen for evaluation. Patient has cut back his exercise to a walk of 30 minutes a day. This is primarily related to the cervical disc disorder at C5-6. He is also complaining of his right inguinal hernia, sometimes it is painful when it bulges out. He does not know if he is ready to have it fixed yet. He has continued discomfort in his neck and now it is starting to bother right arm and right hand. It is described as a numbness. He had C6-7 anterior cervical disc and fusion by Perry Shaw on 07/15/20. Back discomfort likewise. Current Outpatient Medications   Medication Sig Dispense Refill    carvediloL (Coreg) 12.5 mg tablet Take 1 Tablet by mouth.  escitalopram oxalate (LEXAPRO) 10 mg tablet TAKE 1 TABLET BY MOUTH EVERY DAY 90 Tablet 3    famotidine (PEPCID) 20 mg tablet TAKE 1 TABLET BY MOUTH 2 TIMES A DAY W/ DICLOFENAC FOR TREATMENT TO PREVENT HEARTBURN 60 Tablet 1    diclofenac EC (VOLTAREN) 75 mg EC tablet Take 1 Tablet by mouth two (2) times daily (with meals). Start this medication after completing steroid pack. Indications: PRN pain 60 Tablet 1    nortriptyline (PAMELOR) 50 mg capsule       amLODIPine (NORVASC) 5 mg tablet Take 1 Tablet by mouth daily. 30 Tablet 11    ezetimibe (ZETIA) 10 mg tablet TAKE 1 TABLET BY MOUTH EVERY DAY 90 Tablet 3    hydroCHLOROthiazide (HYDRODIURIL) 25 mg tablet Take 1 Tablet by mouth.  olmesartan (BENICAR) 40 mg tablet Take 1 Tablet by mouth.       sildenafil citrate (VIAGRA) 100 mg tablet Take 1 Tablet by mouth as needed for Erectile Dysfunction. 10 Tablet 11    omega 3-dha-epa-fish oil (Fish Oil) 100-160-1,000 mg cap 1 Tab.  multivit-min/FA/lycopen/lutein (CENTRUM SILVER MEN PO) Take  by mouth.  pitavastatin calcium (LIVALO) 2 mg tablet Take 1 Tab by mouth daily. 30 Tab 11    brimonidine-timolol (COMBIGAN) 0.2-0.5 % drop ophthalmic solution 1 drop every twelve (12) hours.  latanoprost (XALATAN) 0.005 % ophthalmic solution Administer 1 drop to both eyes nightly.       metoprolol succinate (TOPROL-XL) 50 mg XL tablet TAKE 1 TABLET BY MOUTH EVERY DAY (Patient not taking: Reported on 5/17/2022)       Past Medical History:   Diagnosis Date    Aortic insufficiency 08/30/2015    moderate - leno dangelo md    Arthritis     Cervical disc disorder at C5-C6 level with radiculopathy 07/15/2020    and c6-7 -anterior cervical disc and fusion by Tramaine Orlando MD    DDD (degenerative disc disease)     Depression     Diastolic dysfunction     Dyslipidemia     Glaucoma     Hypertension     Inguinal hernia of right side with obstruction and without gangrene 11/08/2021    LBP (low back pain)     Lumbar radiculopathy 01/17/2022    S/P colonoscopy 4-13-15    hemorrhoids    Tinnitus of right ear 06/14/2018     Past Surgical History:   Procedure Laterality Date    HX COLONOSCOPY      MD ABDOMEN SURGERY PROC UNLISTED      bilateral hernia repair     Allergies   Allergen Reactions    Crestor [Rosuvastatin] Myalgia    Lipitor [Atorvastatin] Myalgia    Pravastatin Myalgia    Sulfa (Sulfonamide Antibiotics) Hives         REVIEW OF SYSTEMS:  General: negative for - chills or fever  ENT: negative for - headaches, nasal congestion or tinnitus  Respiratory: negative for - cough, hemoptysis, shortness of breath or wheezing  Cardiovascular : negative for - chest pain, edema, palpitations or shortness of breath  Gastrointestinal: negative for - abdominal pain, blood in stools, heartburn or nausea/vomiting  Genito-Urinary: no dysuria, trouble voiding, or hematuria  Musculoskeletal: negative for - gait disturbance, joint pain, joint stiffness or joint swelling  Neurological: no TIA or stroke symptoms  Hematologic: no bruises, no bleeding, no swollen glands  Integument: no lumps, mole changes, nail changes or rash  Endocrine: no malaise/lethargy or unexpected weight changes      Social History     Socioeconomic History    Marital status:    Tobacco Use    Smoking status: Former Smoker    Smokeless tobacco: Never Used   Vaping Use    Vaping Use: Never used   Substance and Sexual Activity    Alcohol use: Yes     Comment: occasional    Drug use: No    Sexual activity: Yes     Partners: Female   Social History Narrative    Medical History: Primary hypertensionDepressive disordernormal head CT May 15, 2007Mild aortic regurgitationDiastolic dysfunctionnormal left    ventricular systolic function ejection fraction 50% echocardiogram 2011negative stress w all motion study 2001 ejection    [facility] and pelvis degenerative changes lumbar spine no acute process        Surgical History: hernia repair 2012colonoscopy 2005[facility] 2014        Hospitalization/Major Diagnostic Procedure: Denies Past Hospitalization        Family History: Mother: alive, hypertension,Father:  79 yrs, chfSister(s): aliveBrother(s): aliveDaughter(s): aliveSon(s):aliveChildren: aliveAdopted: deceased2 brother(s) , 2 sister(s) . 1 son(s) , 1 daughter(s)     . Social History: Alcohol Use Patient does not use alcohol. Smoking Status Patient is a never smoker. Exercise: running. Marital Status: . Lives w ith: spouse - recently had bunion surgery - eric. 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 Education/School: high school. - [place].  [place]     Family History Problem Relation Age of Onset    Heart Disease Father        OBJECTIVE:    Visit Vitals  /83 (BP 1 Location: Left upper arm, BP Patient Position: Sitting)   Pulse (!) 51   Temp 97.9 °F (36.6 °C) (Oral)   Resp 18   Ht 5' 8\" (1.727 m)   Wt 169 lb 12.8 oz (77 kg)   SpO2 98%   BMI 25.82 kg/m²     CONSTITUTIONAL: well , well nourished, appears age appropriate  EYES: perrla, eom intact  ENMT:moist mucous membranes, pharynx clear  NECK: supple. Thyroid normal  RESPIRATORY: Chest: clear bilaterally   CARDIOVASCULAR: Heart: regular rate and rhythm  GASTROINTESTINAL: Abdomen: soft, bowel sounds active  HEMATOLOGIC: no pathological lymph nodes palpated  MUSCULOSKELETAL: Extremities: no edema, pulse 1+   INTEGUMENT: No unusual rashes or suspicious skin lesions noted. Nails appear normal.  NEUROLOGIC: non-focal exam   MENTAL STATUS: alert and oriented, appropriate affect           ASSESSMENT:  1. Primary hypertension    2. Diastolic dysfunction    3. Aortic valve insufficiency, etiology of cardiac valve disease unspecified    4. Dyslipidemia    5. Cervical disc disorder at C5-C6 level with radiculopathy    6. Protrusion of lumbar intervertebral disc    7. Inguinal hernia of right side with obstruction and without gangrene      Sees Dr. Marco Amaya for his aortic valve insufficiency and the Metoprolol was stopped and he was placed on Carvedilol and blood pressure remains stable. Diastolic dysfunction without symptoms. As noted above, Dr. Marco Amaya follows him for aortic insufficiency. He has dyslipidemia and we will check a lipid panel. He is on Livalo and Zetia. He has cervical disc disorder, which is causing numbness down his arm. We do not have any new suggestions. Continue the exercise program.    He has protrusion of the lumbar disc, for which we suggest Shannan technique exercises.     His right inguinal hernia remains reducible and we advise him that he will have to have it fixed if he develops nausea, vomiting or pain or will not reduce, and we make suggestions to try and decrease symptoms. He will be back to see me in six months, sooner if he has any problems. I have discussed the diagnosis with the patient and the intended plan as seen in the  Orders. The patient understands and agees with the plan. The patient has   received an after visit summary and questions were answered concerning  future plans  Patient labs and/or xrays were reviewed  Past records were reviewed. PLAN:  .  Orders Placed This Encounter    carvediloL (Coreg) 12.5 mg tablet                  ATTENTION:   This medical record was transcribed using an electronic medical records system. Although proofread, it may and can contain electronic and spelling errors. Other human spelling and other errors may be present. Corrections may be executed at a later time. Please feel free to contact us for any clarifications as needed.

## 2022-05-18 LAB
ALBUMIN SERPL-MCNC: 3.9 G/DL (ref 3.5–5)
ALBUMIN/GLOB SERPL: 1.1 {RATIO} (ref 1.1–2.2)
ALP SERPL-CCNC: 67 U/L (ref 45–117)
ALT SERPL-CCNC: 28 U/L (ref 12–78)
ANION GAP SERPL CALC-SCNC: 3 MMOL/L (ref 5–15)
APPEARANCE UR: CLEAR
AST SERPL-CCNC: 18 U/L (ref 15–37)
BASOPHILS # BLD: 0 K/UL (ref 0–0.1)
BASOPHILS NFR BLD: 1 % (ref 0–1)
BILIRUB SERPL-MCNC: 0.7 MG/DL (ref 0.2–1)
BILIRUB UR QL: NEGATIVE
BUN SERPL-MCNC: 12 MG/DL (ref 6–20)
BUN/CREAT SERPL: 12 (ref 12–20)
CALCIUM SERPL-MCNC: 9 MG/DL (ref 8.5–10.1)
CHLORIDE SERPL-SCNC: 107 MMOL/L (ref 97–108)
CHOLEST SERPL-MCNC: 214 MG/DL
CO2 SERPL-SCNC: 30 MMOL/L (ref 21–32)
COLOR UR: NORMAL
COMMENT, HOLDF: NORMAL
CREAT SERPL-MCNC: 1.02 MG/DL (ref 0.7–1.3)
DIFFERENTIAL METHOD BLD: ABNORMAL
EOSINOPHIL # BLD: 0.1 K/UL (ref 0–0.4)
EOSINOPHIL NFR BLD: 2 % (ref 0–7)
ERYTHROCYTE [DISTWIDTH] IN BLOOD BY AUTOMATED COUNT: 14.6 % (ref 11.5–14.5)
EST. AVERAGE GLUCOSE BLD GHB EST-MCNC: 100 MG/DL
GLOBULIN SER CALC-MCNC: 3.5 G/DL (ref 2–4)
GLUCOSE SERPL-MCNC: 82 MG/DL (ref 65–100)
GLUCOSE UR STRIP.AUTO-MCNC: NEGATIVE MG/DL
HBA1C MFR BLD: 5.1 % (ref 4–5.6)
HCT VFR BLD AUTO: 45.5 % (ref 36.6–50.3)
HDLC SERPL-MCNC: 65 MG/DL
HDLC SERPL: 3.3 {RATIO} (ref 0–5)
HGB BLD-MCNC: 15.2 G/DL (ref 12.1–17)
HGB UR QL STRIP: NEGATIVE
IMM GRANULOCYTES # BLD AUTO: 0 K/UL (ref 0–0.04)
IMM GRANULOCYTES NFR BLD AUTO: 0 % (ref 0–0.5)
KETONES UR QL STRIP.AUTO: NEGATIVE MG/DL
LDLC SERPL CALC-MCNC: 132.8 MG/DL (ref 0–100)
LEUKOCYTE ESTERASE UR QL STRIP.AUTO: NEGATIVE
LYMPHOCYTES # BLD: 1.2 K/UL (ref 0.8–3.5)
LYMPHOCYTES NFR BLD: 22 % (ref 12–49)
MCH RBC QN AUTO: 30.6 PG (ref 26–34)
MCHC RBC AUTO-ENTMCNC: 33.4 G/DL (ref 30–36.5)
MCV RBC AUTO: 91.7 FL (ref 80–99)
MONOCYTES # BLD: 0.6 K/UL (ref 0–1)
MONOCYTES NFR BLD: 11 % (ref 5–13)
NEUTS SEG # BLD: 3.4 K/UL (ref 1.8–8)
NEUTS SEG NFR BLD: 64 % (ref 32–75)
NITRITE UR QL STRIP.AUTO: NEGATIVE
NRBC # BLD: 0 K/UL (ref 0–0.01)
NRBC BLD-RTO: 0 PER 100 WBC
PH UR STRIP: 6.5 [PH] (ref 5–8)
PLATELET # BLD AUTO: 233 K/UL (ref 150–400)
PMV BLD AUTO: 11.5 FL (ref 8.9–12.9)
POTASSIUM SERPL-SCNC: 4.2 MMOL/L (ref 3.5–5.1)
PROT SERPL-MCNC: 7.4 G/DL (ref 6.4–8.2)
PROT UR STRIP-MCNC: NEGATIVE MG/DL
PSA SERPL-MCNC: 2.3 NG/ML (ref 0.01–4)
RBC # BLD AUTO: 4.96 M/UL (ref 4.1–5.7)
SAMPLES BEING HELD,HOLD: NORMAL
SODIUM SERPL-SCNC: 140 MMOL/L (ref 136–145)
SP GR UR REFRACTOMETRY: 1.01 (ref 1–1.03)
TRIGL SERPL-MCNC: 81 MG/DL (ref ?–150)
UROBILINOGEN UR QL STRIP.AUTO: 0.2 EU/DL (ref 0.2–1)
VLDLC SERPL CALC-MCNC: 16.2 MG/DL
WBC # BLD AUTO: 5.4 K/UL (ref 4.1–11.1)

## 2022-05-18 NOTE — PROGRESS NOTES
We can try Livalo 4 mg as long as you do not have any side effects from it. If you notice anything go back to 2 mg.   The remainder of your studies are okay

## 2022-07-19 RX ORDER — EZETIMIBE 10 MG/1
TABLET ORAL
Qty: 90 TABLET | Refills: 3 | Status: SHIPPED | OUTPATIENT
Start: 2022-07-19

## 2022-11-22 ENCOUNTER — OFFICE VISIT (OUTPATIENT)
Dept: INTERNAL MEDICINE CLINIC | Age: 72
End: 2022-11-22
Payer: MEDICARE

## 2022-11-22 VITALS
DIASTOLIC BLOOD PRESSURE: 71 MMHG | WEIGHT: 172.9 LBS | BODY MASS INDEX: 26.21 KG/M2 | OXYGEN SATURATION: 100 % | SYSTOLIC BLOOD PRESSURE: 116 MMHG | RESPIRATION RATE: 20 BRPM | HEIGHT: 68 IN | TEMPERATURE: 97.8 F | HEART RATE: 46 BPM

## 2022-11-22 DIAGNOSIS — R00.1 BRADYCARDIA: ICD-10-CM

## 2022-11-22 DIAGNOSIS — I35.1 AORTIC VALVE INSUFFICIENCY, ETIOLOGY OF CARDIAC VALVE DISEASE UNSPECIFIED: ICD-10-CM

## 2022-11-22 DIAGNOSIS — M51.26 PROTRUSION OF LUMBAR INTERVERTEBRAL DISC: ICD-10-CM

## 2022-11-22 DIAGNOSIS — M50.122 CERVICAL DISC DISORDER AT C5-C6 LEVEL WITH RADICULOPATHY: ICD-10-CM

## 2022-11-22 DIAGNOSIS — Z00.00 MEDICARE ANNUAL WELLNESS VISIT, SUBSEQUENT: Primary | ICD-10-CM

## 2022-11-22 DIAGNOSIS — I51.89 DIASTOLIC DYSFUNCTION: ICD-10-CM

## 2022-11-22 DIAGNOSIS — Z13.39 SCREENING FOR ALCOHOLISM: ICD-10-CM

## 2022-11-22 DIAGNOSIS — E78.5 DYSLIPIDEMIA: ICD-10-CM

## 2022-11-22 DIAGNOSIS — I10 PRIMARY HYPERTENSION: ICD-10-CM

## 2022-11-22 LAB
CHOLEST SERPL-MCNC: 189 MG/DL
HDLC SERPL-MCNC: 64 MG/DL
HDLC SERPL: 3 {RATIO} (ref 0–5)
LDLC SERPL CALC-MCNC: 110 MG/DL (ref 0–100)
TRIGL SERPL-MCNC: 75 MG/DL (ref ?–150)
VLDLC SERPL CALC-MCNC: 15 MG/DL

## 2022-11-22 PROCEDURE — G8417 CALC BMI ABV UP PARAM F/U: HCPCS | Performed by: INTERNAL MEDICINE

## 2022-11-22 PROCEDURE — 3017F COLORECTAL CA SCREEN DOC REV: CPT | Performed by: INTERNAL MEDICINE

## 2022-11-22 PROCEDURE — G8752 SYS BP LESS 140: HCPCS | Performed by: INTERNAL MEDICINE

## 2022-11-22 PROCEDURE — G8427 DOCREV CUR MEDS BY ELIG CLIN: HCPCS | Performed by: INTERNAL MEDICINE

## 2022-11-22 PROCEDURE — 1101F PT FALLS ASSESS-DOCD LE1/YR: CPT | Performed by: INTERNAL MEDICINE

## 2022-11-22 PROCEDURE — G8754 DIAS BP LESS 90: HCPCS | Performed by: INTERNAL MEDICINE

## 2022-11-22 PROCEDURE — 1123F ACP DISCUSS/DSCN MKR DOCD: CPT | Performed by: INTERNAL MEDICINE

## 2022-11-22 PROCEDURE — 99213 OFFICE O/P EST LOW 20 MIN: CPT | Performed by: INTERNAL MEDICINE

## 2022-11-22 PROCEDURE — 3078F DIAST BP <80 MM HG: CPT | Performed by: INTERNAL MEDICINE

## 2022-11-22 PROCEDURE — G9717 DOC PT DX DEP/BP F/U NT REQ: HCPCS | Performed by: INTERNAL MEDICINE

## 2022-11-22 PROCEDURE — G8536 NO DOC ELDER MAL SCRN: HCPCS | Performed by: INTERNAL MEDICINE

## 2022-11-22 PROCEDURE — 3074F SYST BP LT 130 MM HG: CPT | Performed by: INTERNAL MEDICINE

## 2022-11-22 PROCEDURE — G0439 PPPS, SUBSEQ VISIT: HCPCS | Performed by: INTERNAL MEDICINE

## 2022-11-22 RX ORDER — FAMOTIDINE 20 MG/1
TABLET, FILM COATED ORAL
Qty: 60 TABLET | Refills: 1 | Status: SHIPPED | OUTPATIENT
Start: 2022-11-22

## 2022-11-22 RX ORDER — OLMESARTAN MEDOXOMIL 40 MG/1
40 TABLET ORAL DAILY
Qty: 90 TABLET | Refills: 3 | Status: SHIPPED | OUTPATIENT
Start: 2022-11-22

## 2022-11-22 RX ORDER — EZETIMIBE 10 MG/1
10 TABLET ORAL DAILY
Qty: 90 TABLET | Refills: 3 | Status: SHIPPED | OUTPATIENT
Start: 2022-11-22

## 2022-11-22 RX ORDER — AMLODIPINE BESYLATE 5 MG/1
5 TABLET ORAL DAILY
Qty: 30 TABLET | Refills: 11 | Status: SHIPPED | OUTPATIENT
Start: 2022-11-22

## 2022-11-22 RX ORDER — CARVEDILOL 12.5 MG/1
12.5 TABLET ORAL 2 TIMES DAILY WITH MEALS
Qty: 90 TABLET | Refills: 1 | Status: SHIPPED | OUTPATIENT
Start: 2022-11-22

## 2022-11-22 RX ORDER — ESCITALOPRAM OXALATE 10 MG/1
10 TABLET ORAL DAILY
Qty: 90 TABLET | Refills: 3 | Status: SHIPPED | OUTPATIENT
Start: 2022-11-22

## 2022-11-22 RX ORDER — SILDENAFIL 100 MG/1
100 TABLET, FILM COATED ORAL AS NEEDED
Qty: 10 TABLET | Refills: 11 | Status: SHIPPED | OUTPATIENT
Start: 2022-11-22

## 2022-11-22 RX ORDER — HYDROCHLOROTHIAZIDE 25 MG/1
25 TABLET ORAL DAILY
Qty: 90 TABLET | Refills: 3 | Status: SHIPPED | OUTPATIENT
Start: 2022-11-22

## 2022-11-22 NOTE — PROGRESS NOTES
SPORTS MEDICINE AND PRIMARY CARE  June Cordero MD, [facility] [address]  Phone:  [phone]  Fax: [phone]       Chief Complaint   Patient presents with    Hypertension   . SUBJECTIVE:    Erick Ernst is a 71 y.o. male The patient returns today with known history of hypertension, diastolic dysfunction, aortic valve insufficiency, dyslipidemia, cervical disc disease, with protrusion of the lumbar intervertebral disc, and is seen for evaluation. The patient voicing no new complaints and is seen for evaluation. Care gaps were reviewed. Current Outpatient Medications   Medication Sig Dispense Refill    amLODIPine (NORVASC) 5 mg tablet Take 1 Tablet by mouth daily. 30 Tablet 11    carvediloL (COREG) 12.5 mg tablet Take 1 Tablet by mouth two (2) times daily (with meals). 90 Tablet 1    ezetimibe (ZETIA) 10 mg tablet Take 1 Tablet by mouth daily. 90 Tablet 3    hydroCHLOROthiazide (HYDRODIURIL) 25 mg tablet Take 1 Tablet by mouth daily. 90 Tablet 3    olmesartan (BENICAR) 40 mg tablet Take 1 Tablet by mouth daily. 90 Tablet 3    sildenafil citrate (VIAGRA) 100 mg tablet Take 1 Tablet by mouth as needed for Erectile Dysfunction. 10 Tablet 11    famotidine (PEPCID) 20 mg tablet TAKE 1 TABLET BY MOUTH 2 TIMES A DAY W/ DICLOFENAC FOR TREATMENT TO PREVENT HEARTBURN 60 Tablet 1    escitalopram oxalate (LEXAPRO) 10 mg tablet Take 1 Tablet by mouth daily. 90 Tablet 3    diclofenac EC (VOLTAREN) 75 mg EC tablet Take 1 Tablet by mouth two (2) times daily (with meals). Start this medication after completing steroid pack. Indications: PRN pain 60 Tablet 1    omega 3-dha-epa-fish oil (Fish Oil) 100-160-1,000 mg cap 1 Tab.      multivit-min/FA/lycopen/lutein (CENTRUM SILVER MEN PO) Take  by mouth. brimonidine-timoloL (COMBIGAN) 0.2-0.5 % drop ophthalmic solution 1 drop every twelve (12) hours.       latanoprost (XALATAN) 0.005 % ophthalmic solution Administer 1 drop to both eyes nightly.        Past Medical History:   Diagnosis Date    Aortic insufficiency 08/30/2015    moderate - md [facility]    Arthritis     Cervical disc disorder at C5-C6 level with radiculopathy 07/15/2020    and c6-7 -anterior cervical disc and fusion by Lurdes Foley MD    DDD (degenerative disc disease)     Depression     Diastolic dysfunction     Dyslipidemia     Glaucoma     Hypertension     Inguinal hernia of right side with obstruction and without gangrene 11/08/2021    LBP (low back pain)     Lumbar radiculopathy 01/17/2022    S/P colonoscopy 4-13-15    hemorrhoids    Tinnitus of right ear 06/14/2018     Past Surgical History:   Procedure Laterality Date    HX COLONOSCOPY      MI ABDOMEN SURGERY PROC UNLISTED      bilateral hernia repair     Allergies   Allergen Reactions    Crestor [Rosuvastatin] Myalgia    Lipitor [Atorvastatin] Myalgia    Pravastatin Myalgia    Sulfa (Sulfonamide Antibiotics) Hives         REVIEW OF SYSTEMS:  General: negative for - chills or fever  ENT: negative for - headaches, nasal congestion or tinnitus  Respiratory: negative for - cough, hemoptysis, shortness of breath or wheezing  Cardiovascular : negative for - chest pain, edema, palpitations or shortness of breath  Gastrointestinal: negative for - abdominal pain, blood in stools, heartburn or nausea/vomiting  Genito-Urinary: no dysuria, trouble voiding, or hematuria  Musculoskeletal: negative for - gait disturbance, joint pain, joint stiffness or joint swelling  Neurological: no TIA or stroke symptoms  Hematologic: no bruises, no bleeding, no swollen glands  Integument: no lumps, mole changes, nail changes or rash  Endocrine: no malaise/lethargy or unexpected weight changes      Social History     Socioeconomic History    Marital status:    Tobacco Use    Smoking status: Former    Smokeless tobacco: Never   Vaping Use    Vaping Use: Never used   Substance and Sexual Activity    Alcohol use: Yes     Comment: occasional Drug use: No    Sexual activity: Yes     Partners: Female   Social History Narrative    Medical History: Primary hypertensionDepressive disordernormal head CT May 15, 2007Mild aortic regurgitationDiastolic dysfunctionnormal left    ventricular systolic function ejection fraction 50% echocardiogram 2011negative stress w all motion study 2001 ejection    fraction 48%GlaucomaDecember 2013 CT abdomen and pelvis degenerative changes lumbar spine no acute process        Surgical History: hernia repair 2012colonoscopy 2005[facility] 2014        Hospitalization/Major Diagnostic Procedure: Denies Past Hospitalization        Family History: Mother: alive, hypertension,Father:  79 yrs, chfSister(s): aliveBrother(s): aliveDaughter(s): aliveSon(s):aliveChildren: aliveAdopted: deceased2 brother(s) , 2 sister(s) . 1 son(s) , 1 daughter(s)     . Social History: Alcohol Use Patient does not use alcohol. Smoking Status Patient is a never smoker. Exercise: running. Marital Status: . Lives w ith: spouse - recently had bunion surgery - eric. 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 Education/School: high school. - [place]. [place]     Family History   Problem Relation Age of Onset    Heart Disease Father        OBJECTIVE:    Visit Vitals  /71 (BP 1 Location: Left upper arm, BP Patient Position: Sitting)   Pulse (!) 46   Temp 97.8 °F (36.6 °C) (Oral)   Resp 20   Ht 5' 8\" (1.727 m)   Wt 172 lb 14.4 oz (78.4 kg)   SpO2 100%   BMI 26.29 kg/m²     CONSTITUTIONAL: well , well nourished, appears age appropriate  EYES: perrla, eom intact  ENMT:moist mucous membranes, pharynx clear  NECK: supple.  Thyroid normal  RESPIRATORY: Chest: clear bilaterally   CARDIOVASCULAR: Heart: regular rate and rhythm  GASTROINTESTINAL: Abdomen: soft, bowel sounds active  HEMATOLOGIC: no pathological lymph nodes palpated  MUSCULOSKELETAL: Extremities: no edema, pulse 1+   INTEGUMENT: No unusual rashes or suspicious skin lesions noted. Nails appear normal.  NEUROLOGIC: non-focal exam   MENTAL STATUS: alert and oriented, appropriate affect           ASSESSMENT:  1. Medicare annual wellness visit, subsequent    2. Screening for alcoholism    3. Primary hypertension    4. Diastolic dysfunction    5. Aortic valve insufficiency, etiology of cardiac valve disease unspecified    6. Dyslipidemia    7. Cervical disc disorder at C5-C6 level with radiculopathy    8. Protrusion of lumbar intervertebral disc    9. Bradycardia      Blood pressure control is at goal.  No titration is needed. Blood pressure control is excellent, usually less than 120. No symptoms. He has diastolic dysfunction without symptoms. Aortic insufficiency, he is followed by cardiology for that and has an appointment in _____00:20 to check up. Dyslipidemia is concerning. He is statin intolerant. We will check his lipids today and make a decision as to how we can help him. With his statin intolerance, the next step would be Repatha.  _____00:42 Repatha, he is a little reluctant. We will see what his cholesterol is and then, I will make that recommendation based on the cholesterol results as well as his insurance. Cervical disc disease is not particularly symptomatic. The lumbar radiculopathy and lumbar disc disease is concerning. He used to be a runner. He is no longer running, but he does walk a couple of hours a day. He will be back to see me in six months or sooner if he has any problems. I have discussed the diagnosis with the patient and the intended plan as seen in the  Orders. The patient understands and agees with the plan. The patient has   received an after visit summary and questions were answered concerning  future plans  Patient labs and/or xrays were reviewed  Past records were reviewed.     PLAN:  .  Orders Placed This Encounter LIPID PANEL    amLODIPine (NORVASC) 5 mg tablet    carvediloL (COREG) 12.5 mg tablet    ezetimibe (ZETIA) 10 mg tablet    hydroCHLOROthiazide (HYDRODIURIL) 25 mg tablet    olmesartan (BENICAR) 40 mg tablet    sildenafil citrate (VIAGRA) 100 mg tablet    famotidine (PEPCID) 20 mg tablet    escitalopram oxalate (LEXAPRO) 10 mg tablet         Follow-up and Dispositions    Return in about 6 months (around 5/22/2023). ATTENTION:   This medical record was transcribed using an electronic medical records system. Although proofread, it may and can contain electronic and spelling errors. Other human spelling and other errors may be present. Corrections may be executed at a later time. Please feel free to contact us for any clarifications as needed.

## 2022-11-22 NOTE — PATIENT INSTRUCTIONS
Medicare Wellness Visit, Male    The best way to live healthy is to have a lifestyle where you eat a well-balanced diet, exercise regularly, limit alcohol use, and quit all forms of tobacco/nicotine, if applicable. Regular preventive services are another way to keep healthy. Preventive services (vaccines, screening tests, monitoring & exams) can help personalize your care plan, which helps you manage your own care. Screening tests can find health problems at the earliest stages, when they are easiest to treat. [facility] follows the current, evidence-based guidelines published by the [facility] ([facility]) when recommending preventive services for our patients. Because we follow these guidelines, sometimes recommendations change over time as research supports it. (For example, a prostate screening blood test is no longer routinely recommended for men with no symptoms). Of course, you and your doctor may decide to screen more often for some diseases, based on your risk and co-morbidities (chronic disease you are already diagnosed with). Preventive services for you include:  - Medicare offers their members a free annual wellness visit, which is time for you and your primary care provider to discuss and plan for your preventive service needs. Take advantage of this benefit every year!  -All adults over age 72 should receive the recommended pneumonia vaccines. Current USPSTF guidelines recommend a series of two vaccines for the best pneumonia protection.   -All adults should have a flu vaccine yearly and tetanus vaccine every 10 years.  -All adults age 48 and older should receive the shingles vaccines (series of two vaccines).        -All adults age 38-68 who are overweight should have a diabetes screening test once every three years.   -Other screening tests & preventive services for persons with diabetes include: an eye exam to screen for diabetic retinopathy, a kidney function test, a foot exam, and stricter control over your cholesterol.   -Cardiovascular screening for adults with routine risk involves an electrocardiogram (ECG) at intervals determined by the provider.   -Colorectal cancer screening should be done for adults age 54-65 with no increased risk factors for colorectal cancer. There are a number of acceptable methods of screening for this type of cancer. Each test has its own benefits and drawbacks. Discuss with your provider what is most appropriate for you during your annual wellness visit. The different tests include: colonoscopy (considered the best screening method), a fecal occult blood test, a fecal DNA test, and sigmoidoscopy.  -All adults born between [facility] should be screened once for Hepatitis C.  -An Abdominal Aortic Aneurysm (AAA) Screening is recommended for men age 73-68 who has ever smoked in their lifetime.      Here is a list of your current Health Maintenance items (your personalized list of preventive services) with a due date:  Health Maintenance Due   Topic Date Due    Shingles Vaccine (1 of 2) Never done    COVID-19 Vaccine (4 - Booster for Montano Peter series) 12/06/2021

## 2022-11-23 NOTE — PROGRESS NOTES
Cholesterol is better with the addition of Zetia.   Continue your exercise program and heart healthy diet

## 2022-11-25 NOTE — PROGRESS NOTES
Blood pressure control is at goal.  No titration is needed. Blood pressure control is excellent, usually less than 120. No symptoms. He has diastolic dysfunction without symptoms. Aortic insufficiency, he is followed by cardiology for that and has an appointment in May for a check-up. Dyslipidemia is concerning. He is statin intolerant. We will check his lipids today and make a decision as to how we can help him. With his statin intolerance, the next step would be Repatha. He is a little reluctant. We will see what his cholesterol is and then, I will make that recommendation based on the cholesterol results as well as his insurance. Cervical disc disease is not particularly symptomatic. The lumbar radiculopathy and lumbar disc disease is concerning. He used to be a runner. He is no longer running, but he does walk a couple of hours a day. He will be back to see me in six months or sooner if he has any problems.

## 2022-11-25 NOTE — PROGRESS NOTES
The patient returns today with known history of hypertension, diastolic dysfunction, aortic valve insufficiency, dyslipidemia, cervical disc disease, with protrusion of the lumbar intervertebral disc, and is seen for evaluation. The patient voicing no new complaints and is seen for evaluation. Care gaps were reviewed.

## 2022-12-05 ENCOUNTER — HOSPITAL ENCOUNTER (OUTPATIENT)
Dept: NON INVASIVE DIAGNOSTICS | Age: 72
Discharge: HOME | End: 2022-12-05
Attending: INTERNAL MEDICINE
Payer: MEDICARE

## 2022-12-05 DIAGNOSIS — R00.1 BRADYCARDIA: ICD-10-CM

## 2022-12-05 DIAGNOSIS — I51.89 DIASTOLIC DYSFUNCTION: ICD-10-CM

## 2022-12-05 DIAGNOSIS — I35.1 AORTIC VALVE INSUFFICIENCY, ETIOLOGY OF CARDIAC VALVE DISEASE UNSPECIFIED: ICD-10-CM

## 2022-12-05 PROCEDURE — 93225 XTRNL ECG REC<48 HRS REC: CPT

## 2023-05-22 ENCOUNTER — OFFICE VISIT (OUTPATIENT)
Facility: CLINIC | Age: 73
End: 2023-05-22
Payer: MEDICARE

## 2023-05-22 VITALS
BODY MASS INDEX: 25.91 KG/M2 | TEMPERATURE: 97.7 F | RESPIRATION RATE: 20 BRPM | WEIGHT: 171 LBS | DIASTOLIC BLOOD PRESSURE: 82 MMHG | OXYGEN SATURATION: 98 % | HEART RATE: 52 BPM | HEIGHT: 68 IN | SYSTOLIC BLOOD PRESSURE: 135 MMHG

## 2023-05-22 DIAGNOSIS — I10 PRIMARY HYPERTENSION: Primary | ICD-10-CM

## 2023-05-22 DIAGNOSIS — I35.1 NONRHEUMATIC AORTIC VALVE INSUFFICIENCY: ICD-10-CM

## 2023-05-22 DIAGNOSIS — M51.26 PROTRUSION OF LUMBAR INTERVERTEBRAL DISC: ICD-10-CM

## 2023-05-22 DIAGNOSIS — E78.5 DYSLIPIDEMIA: ICD-10-CM

## 2023-05-22 DIAGNOSIS — Z13.6 SCREENING FOR AAA (ABDOMINAL AORTIC ANEURYSM): ICD-10-CM

## 2023-05-22 DIAGNOSIS — R35.1 NOCTURIA: ICD-10-CM

## 2023-05-22 LAB
ALBUMIN SERPL-MCNC: 3.8 G/DL (ref 3.5–5)
ALBUMIN/GLOB SERPL: 1.2 (ref 1.1–2.2)
ALP SERPL-CCNC: 76 U/L (ref 45–117)
ALT SERPL-CCNC: 26 U/L (ref 12–78)
ANION GAP SERPL CALC-SCNC: 3 MMOL/L (ref 5–15)
APPEARANCE UR: CLEAR
AST SERPL-CCNC: 16 U/L (ref 15–37)
BACTERIA URNS QL MICRO: NEGATIVE /HPF
BASOPHILS # BLD: 0.1 K/UL (ref 0–0.1)
BASOPHILS NFR BLD: 1 % (ref 0–1)
BILIRUB SERPL-MCNC: 0.6 MG/DL (ref 0.2–1)
BILIRUB UR QL: NEGATIVE
BUN SERPL-MCNC: 15 MG/DL (ref 6–20)
BUN/CREAT SERPL: 13 (ref 12–20)
CALCIUM SERPL-MCNC: 9.3 MG/DL (ref 8.5–10.1)
CHLORIDE SERPL-SCNC: 108 MMOL/L (ref 97–108)
CHOLEST SERPL-MCNC: 197 MG/DL
CO2 SERPL-SCNC: 31 MMOL/L (ref 21–32)
COLOR UR: NORMAL
CREAT SERPL-MCNC: 1.12 MG/DL (ref 0.7–1.3)
DIFFERENTIAL METHOD BLD: ABNORMAL
EOSINOPHIL # BLD: 0.3 K/UL (ref 0–0.4)
EOSINOPHIL NFR BLD: 5 % (ref 0–7)
EPITH CASTS URNS QL MICRO: NORMAL /LPF
ERYTHROCYTE [DISTWIDTH] IN BLOOD BY AUTOMATED COUNT: 14.9 % (ref 11.5–14.5)
GLOBULIN SER CALC-MCNC: 3.3 G/DL (ref 2–4)
GLUCOSE SERPL-MCNC: 82 MG/DL (ref 65–100)
GLUCOSE UR STRIP.AUTO-MCNC: NEGATIVE MG/DL
HCT VFR BLD AUTO: 46.6 % (ref 36.6–50.3)
HDLC SERPL-MCNC: 78 MG/DL
HDLC SERPL: 2.5 (ref 0–5)
HGB BLD-MCNC: 15.3 G/DL (ref 12.1–17)
HGB UR QL STRIP: NEGATIVE
HYALINE CASTS URNS QL MICRO: NORMAL /LPF (ref 0–5)
IMM GRANULOCYTES # BLD AUTO: 0 K/UL (ref 0–0.04)
IMM GRANULOCYTES NFR BLD AUTO: 0 % (ref 0–0.5)
KETONES UR QL STRIP.AUTO: NEGATIVE MG/DL
LDLC SERPL CALC-MCNC: 110.2 MG/DL (ref 0–100)
LEUKOCYTE ESTERASE UR QL STRIP.AUTO: NEGATIVE
LYMPHOCYTES # BLD: 1.5 K/UL (ref 0.8–3.5)
LYMPHOCYTES NFR BLD: 24 % (ref 12–49)
MCH RBC QN AUTO: 29.9 PG (ref 26–34)
MCHC RBC AUTO-ENTMCNC: 32.8 G/DL (ref 30–36.5)
MCV RBC AUTO: 91 FL (ref 80–99)
MONOCYTES # BLD: 0.6 K/UL (ref 0–1)
MONOCYTES NFR BLD: 10 % (ref 5–13)
NEUTS SEG # BLD: 3.7 K/UL (ref 1.8–8)
NEUTS SEG NFR BLD: 60 % (ref 32–75)
NITRITE UR QL STRIP.AUTO: NEGATIVE
NRBC # BLD: 0 K/UL (ref 0–0.01)
NRBC BLD-RTO: 0 PER 100 WBC
PH UR STRIP: 6 (ref 5–8)
PLATELET # BLD AUTO: 198 K/UL (ref 150–400)
PMV BLD AUTO: 11.8 FL (ref 8.9–12.9)
POTASSIUM SERPL-SCNC: 4.4 MMOL/L (ref 3.5–5.1)
PROT SERPL-MCNC: 7.1 G/DL (ref 6.4–8.2)
PROT UR STRIP-MCNC: NEGATIVE MG/DL
PSA SERPL-MCNC: 2.8 NG/ML (ref 0.01–4)
RBC # BLD AUTO: 5.12 M/UL (ref 4.1–5.7)
RBC #/AREA URNS HPF: NORMAL /HPF (ref 0–5)
SODIUM SERPL-SCNC: 142 MMOL/L (ref 136–145)
SP GR UR REFRACTOMETRY: 1.01 (ref 1–1.03)
TRIGL SERPL-MCNC: 44 MG/DL
UROBILINOGEN UR QL STRIP.AUTO: 0.2 EU/DL (ref 0.2–1)
VLDLC SERPL CALC-MCNC: 8.8 MG/DL
WBC # BLD AUTO: 6.3 K/UL (ref 4.1–11.1)
WBC URNS QL MICRO: NORMAL /HPF (ref 0–4)

## 2023-05-22 PROCEDURE — 99214 OFFICE O/P EST MOD 30 MIN: CPT | Performed by: INTERNAL MEDICINE

## 2023-05-22 PROCEDURE — 36415 COLL VENOUS BLD VENIPUNCTURE: CPT | Performed by: INTERNAL MEDICINE

## 2023-05-22 PROCEDURE — 3017F COLORECTAL CA SCREEN DOC REV: CPT | Performed by: INTERNAL MEDICINE

## 2023-05-22 PROCEDURE — 3075F SYST BP GE 130 - 139MM HG: CPT | Performed by: INTERNAL MEDICINE

## 2023-05-22 PROCEDURE — G8427 DOCREV CUR MEDS BY ELIG CLIN: HCPCS | Performed by: INTERNAL MEDICINE

## 2023-05-22 PROCEDURE — 3079F DIAST BP 80-89 MM HG: CPT | Performed by: INTERNAL MEDICINE

## 2023-05-22 PROCEDURE — 1123F ACP DISCUSS/DSCN MKR DOCD: CPT | Performed by: INTERNAL MEDICINE

## 2023-05-22 PROCEDURE — 1036F TOBACCO NON-USER: CPT | Performed by: INTERNAL MEDICINE

## 2023-05-22 PROCEDURE — G8419 CALC BMI OUT NRM PARAM NOF/U: HCPCS | Performed by: INTERNAL MEDICINE

## 2023-05-22 RX ORDER — DICLOFENAC SODIUM 75 MG/1
75 TABLET, DELAYED RELEASE ORAL 2 TIMES DAILY
Qty: 60 TABLET | Refills: 3 | Status: SHIPPED | OUTPATIENT
Start: 2023-05-22

## 2023-05-22 SDOH — HEALTH STABILITY: PHYSICAL HEALTH: ON AVERAGE, HOW MANY MINUTES DO YOU ENGAGE IN EXERCISE AT THIS LEVEL?: 40 MIN

## 2023-05-22 SDOH — ECONOMIC STABILITY: FOOD INSECURITY: WITHIN THE PAST 12 MONTHS, YOU WORRIED THAT YOUR FOOD WOULD RUN OUT BEFORE YOU GOT MONEY TO BUY MORE.: NEVER TRUE

## 2023-05-22 SDOH — HEALTH STABILITY: PHYSICAL HEALTH: ON AVERAGE, HOW MANY DAYS PER WEEK DO YOU ENGAGE IN MODERATE TO STRENUOUS EXERCISE (LIKE A BRISK WALK)?: 7 DAYS

## 2023-05-22 SDOH — ECONOMIC STABILITY: INCOME INSECURITY: HOW HARD IS IT FOR YOU TO PAY FOR THE VERY BASICS LIKE FOOD, HOUSING, MEDICAL CARE, AND HEATING?: NOT HARD AT ALL

## 2023-05-22 SDOH — ECONOMIC STABILITY: HOUSING INSECURITY
IN THE LAST 12 MONTHS, WAS THERE A TIME WHEN YOU DID NOT HAVE A STEADY PLACE TO SLEEP OR SLEPT IN A SHELTER (INCLUDING NOW)?: NO

## 2023-05-22 SDOH — ECONOMIC STABILITY: FOOD INSECURITY: WITHIN THE PAST 12 MONTHS, THE FOOD YOU BOUGHT JUST DIDN'T LAST AND YOU DIDN'T HAVE MONEY TO GET MORE.: NEVER TRUE

## 2023-05-22 ASSESSMENT — SOCIAL DETERMINANTS OF HEALTH (SDOH)
HOW OFTEN DO YOU ATTEND CHURCH OR RELIGIOUS SERVICES?: MORE THAN 4 TIMES PER YEAR
HOW OFTEN DO YOU ATTENT MEETINGS OF THE CLUB OR ORGANIZATION YOU BELONG TO?: NEVER
IN A TYPICAL WEEK, HOW MANY TIMES DO YOU TALK ON THE PHONE WITH FAMILY, FRIENDS, OR NEIGHBORS?: MORE THAN THREE TIMES A WEEK
WITHIN THE LAST YEAR, HAVE YOU BEEN HUMILIATED OR EMOTIONALLY ABUSED IN OTHER WAYS BY YOUR PARTNER OR EX-PARTNER?: NO
DO YOU BELONG TO ANY CLUBS OR ORGANIZATIONS SUCH AS CHURCH GROUPS UNIONS, FRATERNAL OR ATHLETIC GROUPS, OR SCHOOL GROUPS?: NO
WITHIN THE LAST YEAR, HAVE YOU BEEN AFRAID OF YOUR PARTNER OR EX-PARTNER?: NO
HOW OFTEN DO YOU GET TOGETHER WITH FRIENDS OR RELATIVES?: THREE TIMES A WEEK
WITHIN THE LAST YEAR, HAVE YOU BEEN KICKED, HIT, SLAPPED, OR OTHERWISE PHYSICALLY HURT BY YOUR PARTNER OR EX-PARTNER?: NO
WITHIN THE LAST YEAR, HAVE TO BEEN RAPED OR FORCED TO HAVE ANY KIND OF SEXUAL ACTIVITY BY YOUR PARTNER OR EX-PARTNER?: NO

## 2023-05-22 ASSESSMENT — LIFESTYLE VARIABLES
HOW MANY STANDARD DRINKS CONTAINING ALCOHOL DO YOU HAVE ON A TYPICAL DAY: PATIENT DOES NOT DRINK
HOW OFTEN DO YOU HAVE A DRINK CONTAINING ALCOHOL: NEVER

## 2023-05-22 NOTE — PROGRESS NOTES
Chief Complaint   Patient presents with    Hypertension    Cholesterol Problem     Maxwell Arthur is a 71 y.o. male  Chief Complaint   Patient presents with    Hypertension    Cholesterol Problem         YES Answers must have Comments  1. \"Have you been to the ER, urgent care clinic since your last visit? Hospitalized since your last visit? \"    [] YES     [x] NO       2. Have you seen or consulted any other health care providers outside of [address] [place] [address] since your last visit?     [] YES   [x] NO       3. For patients aged 39-70: Have you had a colorectal cancer screening such as a colonoscopy/FIT/Cologuard? Nurse/CMA to request records if not in chart   [] YES     [x] NO   [] NA, based on age    If the patient is female:      4. For female patients aged 41-77: Veronica iPres you had a mammogram in the last two years?  Nurse/CMA to request records if not in chart   [] YES     [] NO   [x] NA, based on age    11. For female patients aged 21-65: Veronica Pires you had a pap smear?   Nurse/CMA to request records if not in chart   [] YES     [] NO  [] NA, based on age
June 4, 2001 ejection  fraction 48%GlaucomaDecember 31, 2013 CT abdomen and pelvis degenerative changes lumbar spine no acute process    Surgical History: hernia repair January 11, 2012colonoscopy August 12, [place] [facility]    W paul: employed full time - [employer] w [employer] for 41 years. retire August 31, 2012. Education/School: high school. - [place]. [employer]    ) . 1 son(s) , 1 daughter(s)   . Social History: Alcohol Use Patient does not use alcohol. Smoking Status Patient is a never smoker. Exercise: running. Marital Status: . Lives w ith: spouse - recently had bunion surgery - andrew. Occupation/    Medical History: Primary hypertensionDepressive disordernormal head CT May 15, 2007Mild aortic regurgitationDiastolic dysfunctionnormal left  ventricular systolic function ejection fraction 50% echocardiogram October 13, 2011negative stress w all motion      Social Determinants of Health     Financial Resource Strain: Low Risk     Difficulty of Paying Living Expenses: Not hard at all   Food Insecurity: No Food Insecurity    Worried About Running Out of Food in the Last Year: Never true    Ran Out of Food in the Last Year: Never true   Transportation Needs: Unknown    Lack of Transportation (Non-Medical): No   Physical Activity: Sufficiently Active    Days of Exercise per Week: 7 days    Minutes of Exercise per Session: 40 min   Stress: No Stress Concern Present    Feeling of Stress : Not at all   Social Connections: Moderately Integrated    Frequency of Communication with Friends and Family: More than three times a week    Frequency of Social Gatherings with Friends and Family: Three times a week    Attends Tenriism Services: More than 4 times per year    Active Member of Clubs or Organizations: No    Attends Club or Organization Meetings: Never    Marital Status:    Intimate Partner Violence: Not At Risk    Fear of Current or Ex-Partner: No    Emotionally Abused:  No

## 2023-05-23 LAB — HCV AB SERPL QL IA: NONREACTIVE

## 2023-05-31 ENCOUNTER — HOSPITAL ENCOUNTER (OUTPATIENT)
Facility: HOSPITAL | Age: 73
Discharge: HOME | End: 2023-06-03
Attending: INTERNAL MEDICINE
Payer: MEDICARE

## 2023-05-31 DIAGNOSIS — I10 PRIMARY HYPERTENSION: ICD-10-CM

## 2023-05-31 DIAGNOSIS — Z13.6 SCREENING FOR AAA (ABDOMINAL AORTIC ANEURYSM): ICD-10-CM

## 2023-05-31 LAB
VAS AORTA DIST AP: 1.97 CM
VAS AORTA DIST TR: 2.08 CM
VAS AORTA MID AP: 1.67 CM
VAS AORTA MID TRANS: 1.97 CM
VAS AORTA PROX AP: 2.1 CM
VAS AORTA PROX TR: 2.5 CM
VAS LEFT COM ILIAC AP: 1.03 CM
VAS LEFT COM ILIAC TRANS: 1.24 CM
VAS RIGHT COM ILIAC AP: 0.99 CM
VAS RIGHT COM ILIAC TRANS: 1.34 CM

## 2023-05-31 PROCEDURE — 76706 US ABDL AORTA SCREEN AAA: CPT

## 2023-08-13 RX ORDER — CARVEDILOL 12.5 MG/1
TABLET ORAL
Qty: 180 TABLET | Refills: 3 | Status: SHIPPED | OUTPATIENT
Start: 2023-08-13

## 2023-09-03 RX ORDER — DICLOFENAC SODIUM 75 MG/1
TABLET, DELAYED RELEASE ORAL
Qty: 60 TABLET | Refills: 3 | Status: SHIPPED | OUTPATIENT
Start: 2023-09-03

## 2023-11-21 RX ORDER — OLMESARTAN MEDOXOMIL 40 MG/1
40 TABLET ORAL DAILY
Qty: 90 TABLET | Refills: 3 | Status: SHIPPED | OUTPATIENT
Start: 2023-11-21

## 2023-11-22 ENCOUNTER — OFFICE VISIT (OUTPATIENT)
Facility: CLINIC | Age: 73
End: 2023-11-22
Payer: MEDICARE

## 2023-11-22 VITALS
OXYGEN SATURATION: 100 % | BODY MASS INDEX: 26.52 KG/M2 | TEMPERATURE: 98.2 F | HEIGHT: 68 IN | RESPIRATION RATE: 16 BRPM | DIASTOLIC BLOOD PRESSURE: 71 MMHG | WEIGHT: 175 LBS | HEART RATE: 52 BPM | SYSTOLIC BLOOD PRESSURE: 136 MMHG

## 2023-11-22 DIAGNOSIS — G89.29 CHRONIC BILATERAL LOW BACK PAIN WITH RIGHT-SIDED SCIATICA: ICD-10-CM

## 2023-11-22 DIAGNOSIS — M50.122 CERVICAL DISC DISORDER AT C5-C6 LEVEL WITH RADICULOPATHY: ICD-10-CM

## 2023-11-22 DIAGNOSIS — I35.1 NONRHEUMATIC AORTIC VALVE INSUFFICIENCY: ICD-10-CM

## 2023-11-22 DIAGNOSIS — R06.02 SOB (SHORTNESS OF BREATH): ICD-10-CM

## 2023-11-22 DIAGNOSIS — I10 PRIMARY HYPERTENSION: Primary | ICD-10-CM

## 2023-11-22 DIAGNOSIS — F32.A DEPRESSION, UNSPECIFIED DEPRESSION TYPE: ICD-10-CM

## 2023-11-22 DIAGNOSIS — I35.1 MILD AORTIC INSUFFICIENCY: ICD-10-CM

## 2023-11-22 DIAGNOSIS — I51.89 DIASTOLIC DYSFUNCTION: ICD-10-CM

## 2023-11-22 DIAGNOSIS — M54.41 CHRONIC BILATERAL LOW BACK PAIN WITH RIGHT-SIDED SCIATICA: ICD-10-CM

## 2023-11-22 DIAGNOSIS — E78.5 DYSLIPIDEMIA: ICD-10-CM

## 2023-11-22 PROBLEM — H93.11 TINNITUS OF RIGHT EAR: Status: RESOLVED | Noted: 2018-06-14 | Resolved: 2023-11-22

## 2023-11-22 LAB
ANION GAP SERPL CALC-SCNC: 5 MMOL/L (ref 5–15)
BASOPHILS # BLD: 0 K/UL (ref 0–0.1)
BASOPHILS NFR BLD: 1 % (ref 0–1)
BUN SERPL-MCNC: 15 MG/DL (ref 6–20)
BUN/CREAT SERPL: 14 (ref 12–20)
CALCIUM SERPL-MCNC: 9.5 MG/DL (ref 8.5–10.1)
CHLORIDE SERPL-SCNC: 106 MMOL/L (ref 97–108)
CHOLEST SERPL-MCNC: 226 MG/DL
CO2 SERPL-SCNC: 28 MMOL/L (ref 21–32)
CREAT SERPL-MCNC: 1.08 MG/DL (ref 0.7–1.3)
DIFFERENTIAL METHOD BLD: NORMAL
EOSINOPHIL # BLD: 0.2 K/UL (ref 0–0.4)
EOSINOPHIL NFR BLD: 4 % (ref 0–7)
ERYTHROCYTE [DISTWIDTH] IN BLOOD BY AUTOMATED COUNT: 14.4 % (ref 11.5–14.5)
GLUCOSE SERPL-MCNC: 105 MG/DL (ref 65–100)
HCT VFR BLD AUTO: 44.4 % (ref 36.6–50.3)
HDLC SERPL-MCNC: 62 MG/DL
HDLC SERPL: 3.6 (ref 0–5)
HGB BLD-MCNC: 15.2 G/DL (ref 12.1–17)
IMM GRANULOCYTES # BLD AUTO: 0 K/UL (ref 0–0.04)
IMM GRANULOCYTES NFR BLD AUTO: 0 % (ref 0–0.5)
LDLC SERPL CALC-MCNC: 146.8 MG/DL (ref 0–100)
LYMPHOCYTES # BLD: 1.2 K/UL (ref 0.8–3.5)
LYMPHOCYTES NFR BLD: 20 % (ref 12–49)
MCH RBC QN AUTO: 30.1 PG (ref 26–34)
MCHC RBC AUTO-ENTMCNC: 34.2 G/DL (ref 30–36.5)
MCV RBC AUTO: 87.9 FL (ref 80–99)
MONOCYTES # BLD: 0.4 K/UL (ref 0–1)
MONOCYTES NFR BLD: 7 % (ref 5–13)
NEUTS SEG # BLD: 4.1 K/UL (ref 1.8–8)
NEUTS SEG NFR BLD: 68 % (ref 32–75)
NRBC # BLD: 0 K/UL (ref 0–0.01)
NRBC BLD-RTO: 0 PER 100 WBC
NT PRO BNP: 638 PG/ML
PLATELET # BLD AUTO: 202 K/UL (ref 150–400)
PMV BLD AUTO: 11.7 FL (ref 8.9–12.9)
POTASSIUM SERPL-SCNC: 3.8 MMOL/L (ref 3.5–5.1)
RBC # BLD AUTO: 5.05 M/UL (ref 4.1–5.7)
SODIUM SERPL-SCNC: 139 MMOL/L (ref 136–145)
TRIGL SERPL-MCNC: 86 MG/DL
VLDLC SERPL CALC-MCNC: 17.2 MG/DL
WBC # BLD AUTO: 6 K/UL (ref 4.1–11.1)

## 2023-11-22 PROCEDURE — 3078F DIAST BP <80 MM HG: CPT | Performed by: INTERNAL MEDICINE

## 2023-11-22 PROCEDURE — 3017F COLORECTAL CA SCREEN DOC REV: CPT | Performed by: INTERNAL MEDICINE

## 2023-11-22 PROCEDURE — 1036F TOBACCO NON-USER: CPT | Performed by: INTERNAL MEDICINE

## 2023-11-22 PROCEDURE — G8427 DOCREV CUR MEDS BY ELIG CLIN: HCPCS | Performed by: INTERNAL MEDICINE

## 2023-11-22 PROCEDURE — 36415 COLL VENOUS BLD VENIPUNCTURE: CPT | Performed by: INTERNAL MEDICINE

## 2023-11-22 PROCEDURE — 3075F SYST BP GE 130 - 139MM HG: CPT | Performed by: INTERNAL MEDICINE

## 2023-11-22 PROCEDURE — 1123F ACP DISCUSS/DSCN MKR DOCD: CPT | Performed by: INTERNAL MEDICINE

## 2023-11-22 PROCEDURE — G8419 CALC BMI OUT NRM PARAM NOF/U: HCPCS | Performed by: INTERNAL MEDICINE

## 2023-11-22 PROCEDURE — G8484 FLU IMMUNIZE NO ADMIN: HCPCS | Performed by: INTERNAL MEDICINE

## 2023-11-22 PROCEDURE — 99214 OFFICE O/P EST MOD 30 MIN: CPT | Performed by: INTERNAL MEDICINE

## 2023-11-22 RX ORDER — AMLODIPINE BESYLATE 5 MG/1
5 TABLET ORAL DAILY
Qty: 90 TABLET | Refills: 3 | Status: SHIPPED | OUTPATIENT
Start: 2023-11-22

## 2023-11-22 RX ORDER — HYDROCHLOROTHIAZIDE 25 MG/1
TABLET ORAL DAILY
Qty: 30 TABLET | Refills: 3 | Status: CANCELLED | OUTPATIENT
Start: 2023-11-22

## 2023-11-22 RX ORDER — ESCITALOPRAM OXALATE 10 MG/1
10 TABLET ORAL DAILY
Qty: 90 TABLET | Refills: 3 | Status: SHIPPED | OUTPATIENT
Start: 2023-11-22

## 2023-11-22 ASSESSMENT — PATIENT HEALTH QUESTIONNAIRE - PHQ9
2. FEELING DOWN, DEPRESSED OR HOPELESS: 1
SUM OF ALL RESPONSES TO PHQ QUESTIONS 1-9: 9
8. MOVING OR SPEAKING SO SLOWLY THAT OTHER PEOPLE COULD HAVE NOTICED. OR THE OPPOSITE, BEING SO FIGETY OR RESTLESS THAT YOU HAVE BEEN MOVING AROUND A LOT MORE THAN USUAL: 1
7. TROUBLE CONCENTRATING ON THINGS, SUCH AS READING THE NEWSPAPER OR WATCHING TELEVISION: 1
SUM OF ALL RESPONSES TO PHQ QUESTIONS 1-9: 8
SUM OF ALL RESPONSES TO PHQ9 QUESTIONS 1 & 2: 2
5. POOR APPETITE OR OVEREATING: 1
6. FEELING BAD ABOUT YOURSELF - OR THAT YOU ARE A FAILURE OR HAVE LET YOURSELF OR YOUR FAMILY DOWN: 1
SUM OF ALL RESPONSES TO PHQ QUESTIONS 1-9: 9
9. THOUGHTS THAT YOU WOULD BE BETTER OFF DEAD, OR OF HURTING YOURSELF: 1
10. IF YOU CHECKED OFF ANY PROBLEMS, HOW DIFFICULT HAVE THESE PROBLEMS MADE IT FOR YOU TO DO YOUR WORK, TAKE CARE OF THINGS AT HOME, OR GET ALONG WITH OTHER PEOPLE: 1
SUM OF ALL RESPONSES TO PHQ QUESTIONS 1-9: 9
3. TROUBLE FALLING OR STAYING ASLEEP: 1
1. LITTLE INTEREST OR PLEASURE IN DOING THINGS: 1
4. FEELING TIRED OR HAVING LITTLE ENERGY: 1

## 2023-12-11 ENCOUNTER — HOSPITAL ENCOUNTER (OUTPATIENT)
Dept: PHYSICAL THERAPY | Facility: HOSPITAL | Age: 73
Setting detail: RECURRING SERIES
Discharge: HOME | End: 2023-12-14
Payer: MEDICARE

## 2023-12-11 PROCEDURE — 97161 PT EVAL LOW COMPLEX 20 MIN: CPT

## 2023-12-11 PROCEDURE — 97110 THERAPEUTIC EXERCISES: CPT

## 2023-12-11 PROCEDURE — 97112 NEUROMUSCULAR REEDUCATION: CPT

## 2023-12-11 RX ORDER — EZETIMIBE 10 MG/1
10 TABLET ORAL DAILY
Qty: 90 TABLET | Refills: 3 | Status: SHIPPED | OUTPATIENT
Start: 2023-12-11

## 2023-12-11 NOTE — THERAPY EVALUATION
Physical Therapy at [facility],   a part of [address] [place] [address] [address] [place] [address]  Phone: [phone]  Fax: [phone]           PHYSICAL THERAPY - MEDICARE EVALUATION/PLAN OF CARE NOTE (updated 3/23)      Date: 2023          Patient Name:  Romana Rudd :  1950   Medical   Diagnosis:  Cervical disc disorder at C5-C6 level with radiculopathy [M50.122]  Chronic bilateral low back pain with right-sided sciatica [M54.41, G89.29] Treatment Diagnosis:  M54.59  OTHER LOWER BACK PAIN    Referral Source:  Antonietta Garcias, * Provider #:  [id number]                Insurance: Payor: MEDICARE / Plan: MEDICARE PART A AND B / Product Type: *No Product type* /      Patient  verified yes     Visit #   Current  / Total 1 16   Time   In / Out 0927 1015   Total Treatment Time 48   Total Timed Codes 23   1:1 Treatment Time 23      [facility] Totals Reminder:  bill using total billable   min of TIMED therapeutic procedures and modalities. 8-22 min = 1 unit; 23-37 min = 2 units; 38-52 min = 3 units;  53-67 min = 4 units; 68-82 min = 5 units           SUBJECTIVE  Pain Level (0-10 scale): 8/10  [x]constant []intermittent []improving []worsening []no change since onset    Any medication changes, allergies to medications, adverse drug reactions, diagnosis change, or new procedure performed?: [x] No    [] Yes (see summary sheet for update)  Medications: Verified on Patient Summary List    Subjective functional status/changes:     \"Sometimes the pain is unbearable. \"    Start of Care: 2023  Onset Date: 3 years ago  Current symptoms/Complaints: low back pain and numbness and pain down his RLE   Mechanism of Injury: Patient reports a history of running many half and full marathons and he wonders if that is related to his back pain. About three years ago he started noticing increasing pain down his RLE.  It started in his back and hamstring and now
none

## 2023-12-18 ENCOUNTER — APPOINTMENT (OUTPATIENT)
Dept: PHYSICAL THERAPY | Facility: HOSPITAL | Age: 73
End: 2023-12-18
Payer: MEDICARE

## 2023-12-27 ENCOUNTER — HOSPITAL ENCOUNTER (OUTPATIENT)
Dept: PHYSICAL THERAPY | Facility: HOSPITAL | Age: 73
Setting detail: RECURRING SERIES
Discharge: HOME | End: 2023-12-30
Payer: MEDICARE

## 2023-12-27 PROCEDURE — 97140 MANUAL THERAPY 1/> REGIONS: CPT

## 2023-12-27 PROCEDURE — 97112 NEUROMUSCULAR REEDUCATION: CPT

## 2023-12-27 PROCEDURE — 97110 THERAPEUTIC EXERCISES: CPT

## 2023-12-27 NOTE — PROGRESS NOTES
able to sit greater than 30 minutes without pain  2) Pt will be able to drive greater than 12-25 min without increase of pain  3) Pt will be independent in progressive HEP. 4) Pt will demonstrate improvement in FOTO score to >=66/100 to indicate improvement in functional mobility. 5) Pt will report 50% improvement in numbness in RLE      Frequency / Duration: Patient to be seen 1-2 times per week for 16-20 treatments.       PLAN  Yes  Continue plan of care  Re-Cert Due: 20 visits  [x]  Upgrade activities as tolerated  []  Discharge due to :  []  Other:      Florecita Art, KHANH       12/27/2023       8:11 AM

## 2023-12-29 ENCOUNTER — HOSPITAL ENCOUNTER (OUTPATIENT)
Dept: PHYSICAL THERAPY | Facility: HOSPITAL | Age: 73
Setting detail: RECURRING SERIES
Discharge: HOME | End: 2024-01-01
Payer: MEDICARE

## 2023-12-29 PROCEDURE — 97112 NEUROMUSCULAR REEDUCATION: CPT

## 2023-12-29 PROCEDURE — 97140 MANUAL THERAPY 1/> REGIONS: CPT

## 2023-12-29 PROCEDURE — 97110 THERAPEUTIC EXERCISES: CPT

## 2023-12-29 NOTE — PROGRESS NOTES
indicate improvement in functional mobility.                           5) Pt will report 50% improvement in numbness in RLE      Frequency / Duration: Patient to be seen 1-2 times per week for 16-20 treatments.      PLAN  Yes  Continue plan of care  Re-Cert Due: 20 visits  [x]  Upgrade activities as tolerated  []  Discharge due to :  []  Other:      Neisha Lim, PTA       12/29/2023       8:35 AM

## 2024-01-04 ENCOUNTER — HOSPITAL ENCOUNTER (OUTPATIENT)
Dept: PHYSICAL THERAPY | Facility: HOSPITAL | Age: 74
Setting detail: RECURRING SERIES
Discharge: HOME | End: 2024-01-07
Payer: MEDICARE

## 2024-01-04 PROCEDURE — 97110 THERAPEUTIC EXERCISES: CPT

## 2024-01-04 PROCEDURE — 97112 NEUROMUSCULAR REEDUCATION: CPT

## 2024-01-04 PROCEDURE — 97140 MANUAL THERAPY 1/> REGIONS: CPT

## 2024-01-04 NOTE — PROGRESS NOTES
demonstrate improvement in FOTO score to >=66/100 to indicate improvement in functional mobility.                           5) Pt will report 50% improvement in numbness in RLE      Frequency / Duration: Patient to be seen 1-2 times per week for 16-20 treatments.      PLAN  Yes  Continue plan of care  Re-Cert Due: 20 visits  [x]  Upgrade activities as tolerated  []  Discharge due to :  []  Other:      Neisha Lim, KHANH       1/4/2024       9:45 AM

## 2024-01-09 ENCOUNTER — HOSPITAL ENCOUNTER (OUTPATIENT)
Dept: PHYSICAL THERAPY | Facility: HOSPITAL | Age: 74
Setting detail: RECURRING SERIES
Discharge: HOME | End: 2024-01-12
Payer: MEDICARE

## 2024-01-09 PROCEDURE — 97140 MANUAL THERAPY 1/> REGIONS: CPT

## 2024-01-09 PROCEDURE — 97110 THERAPEUTIC EXERCISES: CPT

## 2024-01-09 PROCEDURE — 97112 NEUROMUSCULAR REEDUCATION: CPT

## 2024-01-09 NOTE — PROGRESS NOTES
Physical Therapy at [facility],   a part of [address], [address]  [place] [address]  Phone: [phone]  Fax: [phone]      PHYSICAL THERAPY DISCHARGE NOTE  Patient Name:  Rohan Nash :  1950   Treatment/Medical Diagnosis: Cervical disc disorder at C5-C6 level with radiculopathy [M50.122]  Chronic bilateral low back pain with right-sided sciatica [M54.41, G89.29]   Referral Source:  Elijah Newell, *     Date of Initial Visit:   Attended Visits:  6 Missed Visits:  1     SUMMARY OF TREATMENT/ASSESSMENT:  Overall, patient is making slow but good gains with skilled physical therapy. He reports 75% improvement since starting skilled PT. His FOTO score has significantly improved as well. Emphasized form and core engagement throughout exercises. He will be discharged from skilled physical therapy to address her impairments and maximize outcomes.       CURRENT STATUS  FOTO score: 70/100 (58)      Short and Long Term Goals: To be accomplished in 16-20 treatments.  1) Pt will be able to sit greater than 30 minutes without pain MET  2) Pt will be able to drive greater than 45-60 min without increase of pain MET  3) Pt will be independent in progressive HEP. MET  4) Pt will demonstrate improvement in FOTO score to >=66/100 to indicate improvement in functional mobility.  MET                        5) Pt will report 50% improvement in numbness in RLE MET      RECOMMENDATIONS  Patient will be discharged from skilled physical therapy to his progressive HEP.         Abner Johnson, PT       2024       11:10 AM    If you have any questions/comments please contact us directly at [phone].   Thank you for allowing us to assist in the care of your patient.

## 2024-01-09 NOTE — PROGRESS NOTES
PHYSICAL THERAPY - MEDICARE DAILY TREATMENT NOTE (updated 3/23)      Date: 2024          Patient Name:  Rohan Nash :  1950   Medical   Diagnosis:  Cervical disc disorder at C5-C6 level with radiculopathy [M50.122]  Chronic bilateral low back pain with right-sided sciatica [M54.41, G89.29] Treatment Diagnosis:  M54.59  OTHER LOWER BACK PAIN    Referral Source:  Elijah Newell, * Insurance:   Payor: MEDICARE / Plan: MEDICARE PART A AND B / Product Type: *No Product type* /                     Patient  verified yes     Visit #   Current  / Total 6 20   Time   In / Out 1100 1200   Total Treatment Time 60   Total Timed Codes 60   1:1 Treatment Time 53      [facility] Totals Reminder:  bill using total billable   min of TIMED therapeutic procedures and modalities.   8-22 min = 1 unit; 23-37 min = 2 units; 38-52 min = 3 units; 53-67 min = 4 units; 68-82 min = 5 units            SUBJECTIVE    Pain Level (0-10 scale): 4/10    Any medication changes, allergies to medications, adverse drug reactions, diagnosis change, or new procedure performed?: [x] No    [] Yes (see summary sheet for update)  Medications: Verified on Patient Summary List    Subjective functional status/changes:     Pt reports that he has been feeling pretty good.     OBJECTIVE      Therapeutic Procedures:  Tx Min Billable or 1:1 Min (if diff from Tx Min) Procedure, Rationale, Specifics   35 09 91110 Therapeutic Exercise (timed):  increase ROM, strength, coordination, balance, and proprioception to improve patient's ability to progress to PLOF and address remaining functional goals. (see flow sheet as applicable)     Details if applicable:     15 15 [id number] Neuromuscular Re-Education (timed):  improve balance, coordination, kinesthetic sense, posture, core stability and proprioception to improve patient's ability to develop conscious control of individual muscles and awareness of position of extremities in order to progress to PLOF and

## 2024-01-11 ENCOUNTER — APPOINTMENT (OUTPATIENT)
Dept: PHYSICAL THERAPY | Facility: HOSPITAL | Age: 74
End: 2024-01-11
Payer: MEDICARE

## 2024-01-19 RX ORDER — DICLOFENAC SODIUM 75 MG/1
TABLET, DELAYED RELEASE ORAL
Qty: 60 TABLET | Refills: 3 | OUTPATIENT
Start: 2024-01-19

## 2024-01-26 RX ORDER — DICLOFENAC SODIUM 75 MG/1
TABLET, DELAYED RELEASE ORAL
Qty: 60 TABLET | Refills: 3 | OUTPATIENT
Start: 2024-01-26

## 2024-01-29 RX ORDER — DICLOFENAC SODIUM 75 MG/1
75 TABLET, DELAYED RELEASE ORAL 2 TIMES DAILY PRN
Qty: 60 TABLET | Refills: 5 | Status: SHIPPED | OUTPATIENT
Start: 2024-01-29

## 2024-02-27 NOTE — PROGRESS NOTES
Chief Complaint  Chief Complaint   Patient presents with    Diabetes     Type 2. Jin is attached.       Subjective          History of Present Illness    Contreras Albert 67 y.o. presents for a follow-up evaluation for type 2 DM     He has been diabetic since 2001 and started insulin in 2009        Pt is on the following medications for their DM:            Non-Chemo Days     Lantus 16 units in the morning and 18 units in evening and Novolog 12 units before breakfast, 10 units before lunch and 12 units before dinner plus 1 units per every 50 over 150         Chemo Days     Lantus to 30 units twice a day on the day of chemotherapy and on the morning after chemotherapy.  NovoLog to 18 units 3 times daily with meals +2 units per 50 greater than 150 on the day of chemotherapy        Pt complains of numbness and tingling in feet/hands    Denies chest pain, shortness of breath and vision changes.      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 Had previous intraocular eye injections on both eyes.  Last eye exam was 08/2023     Pt does have a history of nephropathy.  Patient is not currently taking ACE/ARB - follows Dr. 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  He is on gabapentin 300 mg 1 in am and 2 in pm  prescribed by his oncologist Dr. 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  Along with the chemo treatments, he is receiving steroids once a month.          Blood glucoses are checked via Jin - just started 2 days ago    Fasting blood glucoses: low 100s; after chemo 400s    Pre-meal blood glucoses: 140s - 150s non chemo days    Pt had one episode of hypoglycemia.            Hyperlipidemia     Pt is  Precious Perry is a 71 y.o. male    Chief Complaint   Patient presents with    Hypertension     1. Have you been to the ER, urgent care clinic since your last visit? Hospitalized since your last visit? No    2. Have you seen or consulted any other health care providers outside of the [address] [place] [address] since your last visit? Include any pap smears or colon screening. No  This is the Subsequent Medicare Annual Wellness Exam, performed 12 months or more after the Initial AWV or the last Subsequent AWV    I have reviewed the patient's medical history in detail and updated the computerized patient record. Assessment/Plan   Education and counseling provided:  Are appropriate based on today's review and evaluation    1. Medicare annual wellness visit, subsequent  2.  Screening for alcoholism     Depression Risk Factor Screening     3 most recent PHQ Screens 11/22/2022   PHQ Not Done -   Little interest or pleasure in doing things Not at all   Feeling down, depressed, irritable, or hopeless Not at all   Total Score PHQ 2 0   Trouble falling or staying asleep, or sleeping too much Not at all   Feeling tired or having little energy Not at all   Poor appetite, weight loss, or overeating Not at all   Feeling bad about yourself - or that you are a failure or have let yourself or your family down Not at all   Trouble concentrating on things such as school, work, reading, or watching TV Not at all   Moving or speaking so slowly that other people could have noticed; or the opposite being so fidgety that others notice Not at all   Thoughts of being better off dead, or hurting yourself in some way Not at all   PHQ 9 Score 0   How difficult have these problems made it for you to do your work, take care of your home and get along with others Not difficult at all       Alcohol & Drug Abuse Risk Screen    Do you average more than 1 drink per night or more than 7 drinks a week: No    In the past three months have you have had "currently taking atorvastatin 10 mg HS      Last lipid panel in 11/23 showed Total 104, HDL 28, LDL 50 and Triglycerides 406            Hypertension with CKD Stage 4     Pt denies any chest pain, palpitations or headache     Current regimen includes Bumex 1 mg BID and carvedilol 12.5 mg daily  He was taken off amlodipine because of pedal edema                 Other Medical History     He has multiple myeloma and had chemotherapy followed by stem cell transplant.  His cancer has came out of remission and he is on Pomalyst along with steriods monthly.  He transferred his care back to [facility] from [facility].        He had a previous right partial nephrectomy for cancer.           Pt was hospitalized 09/23/22-09/28/22 for hypocalcemia, hypomagnesemia and pancytopenia.            I have reviewed the patient's allergies, medicines, past medical hx, family hx and social hx.    Objective   Vital Signs:   /84 (BP Location: Left arm, Patient Position: Sitting)   Pulse 69   Ht 188 cm (74.02\")   Wt (!) 142 kg (313 lb 9.6 oz)   SpO2 98%   BMI 40.25 kg/m²       Physical Exam   Physical Exam  Constitutional:       General: He is not in acute distress.     Appearance: Normal appearance. He is not diaphoretic.   HENT:      Head: Normocephalic and atraumatic.   Eyes:      General:         Right eye: No discharge.         Left eye: No discharge.   Skin:     General: Skin is warm and dry.   Neurological:      Mental Status: He is alert.   Psychiatric:         Mood and Affect: Mood normal.         Behavior: Behavior normal.                    Results Review:   Hemoglobin A1C   Date Value Ref Range Status   08/29/2023 8.10 (H) 4.80 - 5.60 % Final     Comment:     Hemoglobin A1C Ranges:  Increased Risk for Diabetes  5.7% to 6.4%  Diabetes                     >= 6.5%  Diabetic Goal                < 7.0%     05/23/2020 6.60 (H) 4.80 - 5.60 % Final     Triglycerides   Date Value Ref Range Status   11/29/2023 406 (H) 0 " more than 4 drinks containing alcohol on one occasion: Yes          Functional Ability and Level of Safety    Hearing: Hearing is good. Activities of Daily Living: The home contains: no safety equipment. Patient does total self care      Ambulation: with no difficulty     Fall Risk:  Fall Risk Assessment, last 12 mths 5/17/2022   Able to walk? Yes   Fall in past 12 months? 0   Do you feel unsteady? 0   Are you worried about falling 0      Abuse Screen:  Patient is not abused       Cognitive Screening    Has your family/caregiver stated any concerns about your memory: no     Cognitive Screening: Normal - Verbal Fluency Test    Health Maintenance Due     Health Maintenance Due   Topic Date Due    Shingrix Vaccine Age 49> (1 of 2) Never done    COVID-19 Vaccine (4 - Booster for Pfizer series) 12/06/2021       Patient Care Team   Patient Care Team:  Edison Huertas MD as PCP - General (Internal Medicine Physician)  Edison Huertas MD as PCP - REHABILITATION [facility] Empaneled Provider    History     Patient Active Problem List   Diagnosis Code    Hypertension I10    Arthritis M19.90    Low back pain V35.15    Diastolic dysfunction Q52.99    Aortic insufficiency I35.1    Mild aortic insufficiency I35.1    Tinnitus of right ear H93.11    Dyslipidemia E78.5    Cervical disc disorder at C5-C6 level with radiculopathy M50.122    Inguinal hernia of right side with obstruction and without gangrene K40.30    Depression F32. A    Lumbar radiculopathy M54.16    Protrusion of lumbar intervertebral disc M51.26     Past Medical History:   Diagnosis Date    Aortic insufficiency 08/30/2015    moderate - [facility]    Arthritis     Cervical disc disorder at C5-C6 level with radiculopathy 07/15/2020    and c6-7 -anterior cervical disc and fusion by Cristina Byrd MD    DDD (degenerative disc disease)     Depression     Diastolic dysfunction     Dyslipidemia     Glaucoma     Hypertension     Inguinal hernia of right side with obstruction - 149 mg/dL Final     HDL Cholesterol   Date Value Ref Range Status   11/29/2023 28 (L) >39 mg/dL Final     LDL Chol Calc (NIH)   Date Value Ref Range Status   11/29/2023 20 0 - 99 mg/dL Final     VLDL Cholesterol [place]ale   Date Value Ref Range Status   11/29/2023 56 (H) 5 - 40 mg/dL Final     LDL/HDL RATIO   Date Value Ref Range Status   01/15/2021 2.8 0.0 - 3.6 ratio Final     Comment:                                         LDL/HDL Ratio                                              Men  Women                                1/2 Avg.Risk  1.0    1.5                                    Avg.Risk  3.6    3.2                                 2X Avg.Risk  6.2    5.0                                 3X Avg.Risk  8.0    6.1           Assessment and Plan {CC Problem List  Visit Diagnosis  ROS  Review (Popup)  Health Maintenance  Quality  BestPractice  Medications  SmartSets  SnapShot Encounters  Media :23  Diagnoses and all orders for this visit:    1. Type 2 diabetes mellitus with stage 4 chronic kidney disease, with long-term current use of insulin (Primary)  -     Hemoglobin A1c; Future  -     Fructosamine; Future    Check labs   Continue with current Lantus and Novolog dosing  Continue with Jin - he liked Dexcom better, but stated insurance would cover Dexcom.  He has to use reader, therefore they may not cover another reader      2. Mixed hyperlipidemia  -     Lipid Panel; Future  -     atorvastatin (LIPITOR) 10 MG tablet; Take 1 tablet by mouth Every Night.  Dispense: 90 tablet; Refill: 1    Check labs   Continue with statin      3. Benign hypertension with CKD (chronic kidney disease) stage IV    Stable  Continue with current medication regimen  Defer management to PCP/Nephrology            Refills sent to pharmacy        Labs at oncology office in 2 weeks  RTC in 3-4 months with me and 6-7 months with Dr. Haro      Follow Up     Patient was given instructions and counseling regarding her condition or for  and without gangrene 11/08/2021    LBP (low back pain)     Lumbar radiculopathy 01/17/2022    S/P colonoscopy 4-13-15    hemorrhoids    Tinnitus of right ear 06/14/2018      Past Surgical History:   Procedure Laterality Date    HX COLONOSCOPY      DE ABDOMEN SURGERY PROC UNLISTED      bilateral hernia repair     Current Outpatient Medications   Medication Sig Dispense Refill    ezetimibe (ZETIA) 10 mg tablet TAKE 1 TABLET BY MOUTH EVERY DAY 90 Tablet 3    carvediloL (COREG) 12.5 mg tablet Take 1 Tablet by mouth. amLODIPine (NORVASC) 5 mg tablet Take 1 Tablet by mouth daily. 30 Tablet 11    escitalopram oxalate (LEXAPRO) 10 mg tablet TAKE 1 TABLET BY MOUTH EVERY DAY 90 Tablet 3    famotidine (PEPCID) 20 mg tablet TAKE 1 TABLET BY MOUTH 2 TIMES A DAY W/ DICLOFENAC FOR TREATMENT TO PREVENT HEARTBURN 60 Tablet 1    diclofenac EC (VOLTAREN) 75 mg EC tablet Take 1 Tablet by mouth two (2) times daily (with meals). Start this medication after completing steroid pack. Indications: PRN pain 60 Tablet 1    hydroCHLOROthiazide (HYDRODIURIL) 25 mg tablet Take 1 Tablet by mouth. olmesartan (BENICAR) 40 mg tablet Take 1 Tablet by mouth.      sildenafil citrate (VIAGRA) 100 mg tablet Take 1 Tablet by mouth as needed for Erectile Dysfunction. 10 Tablet 11    omega 3-dha-epa-fish oil (Fish Oil) 100-160-1,000 mg cap 1 Tab.      multivit-min/FA/lycopen/lutein (CENTRUM SILVER MEN PO) Take  by mouth. brimonidine-timoloL (COMBIGAN) 0.2-0.5 % drop ophthalmic solution 1 drop every twelve (12) hours. latanoprost (XALATAN) 0.005 % ophthalmic solution Administer 1 drop to both eyes nightly. pitavastatin calcium (LIVALO) 4 mg tab tablet Take 1 Tablet by mouth daily.  (Patient not taking: Reported on 11/22/2022) 90 Tablet 3    nortriptyline (PAMELOR) 50 mg capsule  (Patient not taking: Reported on 11/22/2022)      metoprolol succinate (TOPROL-XL) 50 mg XL tablet TAKE 1 TABLET BY MOUTH EVERY DAY (Patient not taking: No health maintenance advice. Please see specific information pulled into the AVS if appropriate.              Chelsea Haskins, IZZY  02/27/24              sig reported)       Allergies   Allergen Reactions    Crestor [Rosuvastatin] Myalgia    Lipitor [Atorvastatin] Myalgia    Pravastatin Myalgia    Sulfa (Sulfonamide Antibiotics) Hives       Family History   Problem Relation Age of Onset    Heart Disease Father      Social History     Tobacco Use    Smoking status: Former    Smokeless tobacco: Never   Substance Use Topics    Alcohol use: Yes     Comment: occasional         Jennifer Heard MA

## 2024-05-22 ENCOUNTER — OFFICE VISIT (OUTPATIENT)
Facility: CLINIC | Age: 74
End: 2024-05-22
Payer: MEDICARE

## 2024-05-22 VITALS
SYSTOLIC BLOOD PRESSURE: 136 MMHG | OXYGEN SATURATION: 98 % | HEIGHT: 68 IN | WEIGHT: 176.4 LBS | BODY MASS INDEX: 26.73 KG/M2 | TEMPERATURE: 99 F | HEART RATE: 48 BPM | DIASTOLIC BLOOD PRESSURE: 75 MMHG | RESPIRATION RATE: 16 BRPM

## 2024-05-22 DIAGNOSIS — I10 PRIMARY HYPERTENSION: ICD-10-CM

## 2024-05-22 DIAGNOSIS — E78.5 DYSLIPIDEMIA: ICD-10-CM

## 2024-05-22 DIAGNOSIS — I51.89 DIASTOLIC DYSFUNCTION: ICD-10-CM

## 2024-05-22 DIAGNOSIS — Z00.00 MEDICARE ANNUAL WELLNESS VISIT, SUBSEQUENT: Primary | ICD-10-CM

## 2024-05-22 DIAGNOSIS — R35.1 NOCTURIA: ICD-10-CM

## 2024-05-22 DIAGNOSIS — M50.122 CERVICAL DISC DISORDER AT C5-C6 LEVEL WITH RADICULOPATHY: ICD-10-CM

## 2024-05-22 DIAGNOSIS — I35.1 NONRHEUMATIC AORTIC VALVE INSUFFICIENCY: ICD-10-CM

## 2024-05-22 LAB
ALBUMIN SERPL-MCNC: 3.8 G/DL (ref 3.5–5)
ALBUMIN/GLOB SERPL: 1.2 (ref 1.1–2.2)
ALP SERPL-CCNC: 73 U/L (ref 45–117)
ALT SERPL-CCNC: 19 U/L (ref 12–78)
ANION GAP SERPL CALC-SCNC: 1 MMOL/L (ref 5–15)
APPEARANCE UR: CLEAR
AST SERPL-CCNC: 13 U/L (ref 15–37)
BACTERIA URNS QL MICRO: NEGATIVE /HPF
BASOPHILS # BLD: 0 K/UL (ref 0–0.1)
BASOPHILS NFR BLD: 1 % (ref 0–1)
BILIRUB SERPL-MCNC: 0.7 MG/DL (ref 0.2–1)
BILIRUB UR QL: NEGATIVE
BUN SERPL-MCNC: 19 MG/DL (ref 6–20)
BUN/CREAT SERPL: 16 (ref 12–20)
CALCIUM SERPL-MCNC: 9.6 MG/DL (ref 8.5–10.1)
CHLORIDE SERPL-SCNC: 109 MMOL/L (ref 97–108)
CHOLEST SERPL-MCNC: 218 MG/DL
CO2 SERPL-SCNC: 31 MMOL/L (ref 21–32)
COLOR UR: NORMAL
CREAT SERPL-MCNC: 1.18 MG/DL (ref 0.7–1.3)
DIFFERENTIAL METHOD BLD: ABNORMAL
EOSINOPHIL # BLD: 0.2 K/UL (ref 0–0.4)
EOSINOPHIL NFR BLD: 3 % (ref 0–7)
EPITH CASTS URNS QL MICRO: NORMAL /LPF
ERYTHROCYTE [DISTWIDTH] IN BLOOD BY AUTOMATED COUNT: 14.6 % (ref 11.5–14.5)
GLOBULIN SER CALC-MCNC: 3.2 G/DL (ref 2–4)
GLUCOSE SERPL-MCNC: 75 MG/DL (ref 65–100)
GLUCOSE UR STRIP.AUTO-MCNC: NEGATIVE MG/DL
HCT VFR BLD AUTO: 43.8 % (ref 36.6–50.3)
HDLC SERPL-MCNC: 65 MG/DL
HDLC SERPL: 3.4 (ref 0–5)
HGB BLD-MCNC: 14.7 G/DL (ref 12.1–17)
HGB UR QL STRIP: NEGATIVE
HYALINE CASTS URNS QL MICRO: NORMAL /LPF (ref 0–5)
IMM GRANULOCYTES # BLD AUTO: 0 K/UL (ref 0–0.04)
IMM GRANULOCYTES NFR BLD AUTO: 0 % (ref 0–0.5)
KETONES UR QL STRIP.AUTO: NEGATIVE MG/DL
LDLC SERPL CALC-MCNC: 140.6 MG/DL (ref 0–100)
LEUKOCYTE ESTERASE UR QL STRIP.AUTO: NEGATIVE
LYMPHOCYTES # BLD: 1.5 K/UL (ref 0.8–3.5)
LYMPHOCYTES NFR BLD: 24 % (ref 12–49)
MCH RBC QN AUTO: 30.2 PG (ref 26–34)
MCHC RBC AUTO-ENTMCNC: 33.6 G/DL (ref 30–36.5)
MCV RBC AUTO: 89.9 FL (ref 80–99)
MONOCYTES # BLD: 0.6 K/UL (ref 0–1)
MONOCYTES NFR BLD: 11 % (ref 5–13)
NEUTS SEG # BLD: 3.8 K/UL (ref 1.8–8)
NEUTS SEG NFR BLD: 61 % (ref 32–75)
NITRITE UR QL STRIP.AUTO: NEGATIVE
NRBC # BLD: 0 K/UL (ref 0–0.01)
NRBC BLD-RTO: 0 PER 100 WBC
PH UR STRIP: 6 (ref 5–8)
PLATELET # BLD AUTO: 194 K/UL (ref 150–400)
PMV BLD AUTO: 12.3 FL (ref 8.9–12.9)
POTASSIUM SERPL-SCNC: 4.5 MMOL/L (ref 3.5–5.1)
PROT SERPL-MCNC: 7 G/DL (ref 6.4–8.2)
PROT UR STRIP-MCNC: NEGATIVE MG/DL
PSA SERPL-MCNC: 3.4 NG/ML (ref 0.01–4)
RBC # BLD AUTO: 4.87 M/UL (ref 4.1–5.7)
RBC #/AREA URNS HPF: NORMAL /HPF (ref 0–5)
SODIUM SERPL-SCNC: 141 MMOL/L (ref 136–145)
SP GR UR REFRACTOMETRY: 1.02 (ref 1–1.03)
TRIGL SERPL-MCNC: 62 MG/DL
UROBILINOGEN UR QL STRIP.AUTO: 1 EU/DL (ref 0.2–1)
VLDLC SERPL CALC-MCNC: 12.4 MG/DL
WBC # BLD AUTO: 6.1 K/UL (ref 4.1–11.1)
WBC URNS QL MICRO: NORMAL /HPF (ref 0–4)

## 2024-05-22 PROCEDURE — 36415 COLL VENOUS BLD VENIPUNCTURE: CPT | Performed by: INTERNAL MEDICINE

## 2024-05-22 PROCEDURE — G0439 PPPS, SUBSEQ VISIT: HCPCS | Performed by: INTERNAL MEDICINE

## 2024-05-22 PROCEDURE — 3078F DIAST BP <80 MM HG: CPT | Performed by: INTERNAL MEDICINE

## 2024-05-22 PROCEDURE — 3017F COLORECTAL CA SCREEN DOC REV: CPT | Performed by: INTERNAL MEDICINE

## 2024-05-22 PROCEDURE — 1123F ACP DISCUSS/DSCN MKR DOCD: CPT | Performed by: INTERNAL MEDICINE

## 2024-05-22 PROCEDURE — 3075F SYST BP GE 130 - 139MM HG: CPT | Performed by: INTERNAL MEDICINE

## 2024-05-22 SDOH — ECONOMIC STABILITY: FOOD INSECURITY: WITHIN THE PAST 12 MONTHS, YOU WORRIED THAT YOUR FOOD WOULD RUN OUT BEFORE YOU GOT MONEY TO BUY MORE.: NEVER TRUE

## 2024-05-22 SDOH — ECONOMIC STABILITY: FOOD INSECURITY: WITHIN THE PAST 12 MONTHS, THE FOOD YOU BOUGHT JUST DIDN'T LAST AND YOU DIDN'T HAVE MONEY TO GET MORE.: NEVER TRUE

## 2024-05-22 SDOH — ECONOMIC STABILITY: INCOME INSECURITY: HOW HARD IS IT FOR YOU TO PAY FOR THE VERY BASICS LIKE FOOD, HOUSING, MEDICAL CARE, AND HEATING?: NOT HARD AT ALL

## 2024-05-22 ASSESSMENT — ANXIETY QUESTIONNAIRES
2. NOT BEING ABLE TO STOP OR CONTROL WORRYING: NOT AT ALL
1. FEELING NERVOUS, ANXIOUS, OR ON EDGE: NOT AT ALL
6. BECOMING EASILY ANNOYED OR IRRITABLE: NOT AT ALL
3. WORRYING TOO MUCH ABOUT DIFFERENT THINGS: NOT AT ALL
4. TROUBLE RELAXING: NOT AT ALL
IF YOU CHECKED OFF ANY PROBLEMS ON THIS QUESTIONNAIRE, HOW DIFFICULT HAVE THESE PROBLEMS MADE IT FOR YOU TO DO YOUR WORK, TAKE CARE OF THINGS AT HOME, OR GET ALONG WITH OTHER PEOPLE: NOT DIFFICULT AT ALL
5. BEING SO RESTLESS THAT IT IS HARD TO SIT STILL: NOT AT ALL
7. FEELING AFRAID AS IF SOMETHING AWFUL MIGHT HAPPEN: NOT AT ALL
GAD7 TOTAL SCORE: 0

## 2024-05-22 ASSESSMENT — PATIENT HEALTH QUESTIONNAIRE - PHQ9
2. FEELING DOWN, DEPRESSED OR HOPELESS: NOT AT ALL
10. IF YOU CHECKED OFF ANY PROBLEMS, HOW DIFFICULT HAVE THESE PROBLEMS MADE IT FOR YOU TO DO YOUR WORK, TAKE CARE OF THINGS AT HOME, OR GET ALONG WITH OTHER PEOPLE: NOT DIFFICULT AT ALL
9. THOUGHTS THAT YOU WOULD BE BETTER OFF DEAD, OR OF HURTING YOURSELF: NOT AT ALL
7. TROUBLE CONCENTRATING ON THINGS, SUCH AS READING THE NEWSPAPER OR WATCHING TELEVISION: NOT AT ALL
1. LITTLE INTEREST OR PLEASURE IN DOING THINGS: NOT AT ALL
10. IF YOU CHECKED OFF ANY PROBLEMS, HOW DIFFICULT HAVE THESE PROBLEMS MADE IT FOR YOU TO DO YOUR WORK, TAKE CARE OF THINGS AT HOME, OR GET ALONG WITH OTHER PEOPLE: NOT DIFFICULT AT ALL
6. FEELING BAD ABOUT YOURSELF - OR THAT YOU ARE A FAILURE OR HAVE LET YOURSELF OR YOUR FAMILY DOWN: NOT AT ALL
1. LITTLE INTEREST OR PLEASURE IN DOING THINGS: NOT AT ALL
3. TROUBLE FALLING OR STAYING ASLEEP: NOT AT ALL
SUM OF ALL RESPONSES TO PHQ QUESTIONS 1-9: 0
2. FEELING DOWN, DEPRESSED OR HOPELESS: NOT AT ALL
SUM OF ALL RESPONSES TO PHQ QUESTIONS 1-9: 0
7. TROUBLE CONCENTRATING ON THINGS, SUCH AS READING THE NEWSPAPER OR WATCHING TELEVISION: NOT AT ALL
8. MOVING OR SPEAKING SO SLOWLY THAT OTHER PEOPLE COULD HAVE NOTICED. OR THE OPPOSITE, BEING SO FIGETY OR RESTLESS THAT YOU HAVE BEEN MOVING AROUND A LOT MORE THAN USUAL: NOT AT ALL
8. MOVING OR SPEAKING SO SLOWLY THAT OTHER PEOPLE COULD HAVE NOTICED. OR THE OPPOSITE, BEING SO FIGETY OR RESTLESS THAT YOU HAVE BEEN MOVING AROUND A LOT MORE THAN USUAL: NOT AT ALL
3. TROUBLE FALLING OR STAYING ASLEEP: NOT AT ALL
4. FEELING TIRED OR HAVING LITTLE ENERGY: NOT AT ALL
SUM OF ALL RESPONSES TO PHQ9 QUESTIONS 1 & 2: 0
9. THOUGHTS THAT YOU WOULD BE BETTER OFF DEAD, OR OF HURTING YOURSELF: NOT AT ALL
5. POOR APPETITE OR OVEREATING: NOT AT ALL
SUM OF ALL RESPONSES TO PHQ9 QUESTIONS 1 & 2: 0
6. FEELING BAD ABOUT YOURSELF - OR THAT YOU ARE A FAILURE OR HAVE LET YOURSELF OR YOUR FAMILY DOWN: NOT AT ALL
SUM OF ALL RESPONSES TO PHQ QUESTIONS 1-9: 0
SUM OF ALL RESPONSES TO PHQ QUESTIONS 1-9: 0
5. POOR APPETITE OR OVEREATING: NOT AT ALL
4. FEELING TIRED OR HAVING LITTLE ENERGY: NOT AT ALL
SUM OF ALL RESPONSES TO PHQ QUESTIONS 1-9: 0
SUM OF ALL RESPONSES TO PHQ QUESTIONS 1-9: 0

## 2024-05-22 ASSESSMENT — LIFESTYLE VARIABLES
HOW OFTEN DO YOU HAVE A DRINK CONTAINING ALCOHOL: MONTHLY OR LESS
HOW MANY STANDARD DRINKS CONTAINING ALCOHOL DO YOU HAVE ON A TYPICAL DAY: 1 OR 2

## 2024-05-22 NOTE — PROGRESS NOTES
Chief Complaint   Patient presents with    Medicare AWV     \"Have you been to the ER, urgent care clinic since your last visit?  Hospitalized since your last visit?\"    YES - When: approximately 2  weeks ago.  Where and Why: [facility].    “Have you seen or consulted any other health care providers outside of [facility] since your last visit?”    NO            Click Here for Release of Records Request

## 2024-05-22 NOTE — PROGRESS NOTES
SPORTS MEDICINE AND PRIMARY CARE  Elijah Newell MD, FACP, CMD  [address] [facility] [address]  Phone:  [phone]  Fax: [phone]       Chief Complaint   Patient presents with    Medicare AW   .      SUBJECTIVE:    Rohan Nash is a 73 y.o. male  Dictation on: 05/22/2024  9:39 AM by: ELIJAH NEWELL [[id number]]          Current Outpatient Medications   Medication Sig Dispense Refill    diclofenac (VOLTAREN) 75 MG EC tablet Take 1 tablet by mouth 2 times daily as needed for Pain pc 60 tablet 5    ezetimibe (ZETIA) 10 MG tablet TAKE 1 TABLET BY MOUTH EVERY DAY 90 tablet 3    amLODIPine (NORVASC) 5 MG tablet Take 1 tablet by mouth daily 90 tablet 3    escitalopram (LEXAPRO) 10 MG tablet Take 1 tablet by mouth daily 90 tablet 3    olmesartan (BENICAR) 40 MG tablet TAKE 1 TABLET BY MOUTH EVERY DAY 90 tablet 3    carvedilol (COREG) 12.5 MG tablet TAKE 1 TABLET BY MOUTH TWICE A DAY WITH MEALS 180 tablet 3    Bempedoic Acid-Ezetimibe 180-10 MG TABS Take 1 tablet by mouth daily 90 tablet 3    brimonidine-timolol (COMBIGAN) 0.2-0.5 % ophthalmic solution 1 drop  in the morning and 1 drop in the evening.      famotidine (PEPCID) 20 MG tablet TAKE 1 TABLET BY MOUTH 2 TIMES A DAY W/ DICLOFENAC FOR TREATMENT TO PREVENT HEARTBURN      latanoprost (XALATAN) 0.005 % ophthalmic solution Apply 1 drop to eye      sildenafil (VIAGRA) 100 MG tablet Take by mouth as needed       No current facility-administered medications for this visit.     Past Medical History:   Diagnosis Date    Aortic insufficiency 08/30/2015    moderate - md Niranjan    Arthritis     Cervical disc disorder at C5-C6 level with radiculopathy 07/15/2020    and c6-7 -anterior cervical disc and fusion by Abhishek Johnson MD    DDD (degenerative disc disease)     Depression     Diastolic dysfunction     Dyslipidemia     Glaucoma     Hypertension     Inguinal hernia of right side with obstruction and without gangrene 11/08/2021    LBP (low back pain)

## 2024-05-22 NOTE — PROGRESS NOTES
Medicare Annual Wellness Visit    Rohan Nash is here for Medicare AWV    Assessment & Plan   Medicare annual wellness visit, subsequent  Recommendations for Preventive Services Due: see orders and patient instructions/AVS.  Recommended screening schedule for the next 5-10 years is provided to the patient in written form: see Patient Instructions/AVS.     No follow-ups on file.     Subjective       Patient's complete Health Risk Assessment and screening values have been reviewed and are found in Flowsheets. The following problems were reviewed today and where indicated follow up appointments were made and/or referrals ordered.    Positive Risk Factor Screenings with Interventions:                   Vision Screen:  Do you have difficulty driving, watching TV, or doing any of your daily activities because of your eyesight?: No  Have you had an eye exam within the past year?: (!) No  No results found.    Interventions:   See A/P for any pertinent orders    Safety:  Do you have non-slip mats or non-slip surfaces or shower bars or grab bars in your shower or bathtub?: (!) No  Interventions:  See A/P for plan and any pertinent orders                   Objective   Vitals:    05/22/24 0856   BP: 136/75   Site: Left Upper Arm   Position: Sitting   Cuff Size: Large Adult   Pulse: (!) 48   Resp: 16   Temp: 99 °F (37.2 °C)   TempSrc: Oral   SpO2: 98%   Weight: 80 kg (176 lb 6.4 oz)   Height: 1.727 m (5' 8\")      Body mass index is 26.82 kg/m².               Allergies   Allergen Reactions    Atorvastatin Myalgia    Pravastatin Myalgia    Rosuvastatin Myalgia    Sulfa Antibiotics Hives     Prior to Visit Medications    Medication Sig Taking? Authorizing Provider   diclofenac (VOLTAREN) 75 MG EC tablet Take 1 tablet by mouth 2 times daily as needed for Pain pc Yes Elijah Newell MD   ezetimibe (ZETIA) 10 MG tablet TAKE 1 TABLET BY MOUTH EVERY DAY Yes Elijah Newell MD   amLODIPine (NORVASC) 5 MG tablet Take 1

## 2024-06-17 RX ORDER — CARVEDILOL 12.5 MG/1
12.5 TABLET ORAL 2 TIMES DAILY WITH MEALS
Qty: 180 TABLET | Refills: 3 | Status: SHIPPED | OUTPATIENT
Start: 2024-06-17

## 2024-07-05 RX ORDER — DICLOFENAC SODIUM 75 MG/1
75 TABLET, DELAYED RELEASE ORAL 2 TIMES DAILY
Qty: 60 TABLET | Refills: 5 | Status: SHIPPED | OUTPATIENT
Start: 2024-07-05

## 2024-09-02 RX ORDER — AMLODIPINE BESYLATE 5 MG/1
5 TABLET ORAL DAILY
Qty: 90 TABLET | Refills: 3 | Status: SHIPPED | OUTPATIENT
Start: 2024-09-02

## 2024-09-17 RX ORDER — EZETIMIBE 10 MG/1
10 TABLET ORAL DAILY
Qty: 90 TABLET | Refills: 3 | Status: SHIPPED | OUTPATIENT
Start: 2024-09-17

## 2024-09-26 RX ORDER — OLMESARTAN MEDOXOMIL 40 MG/1
40 TABLET ORAL DAILY
Qty: 90 TABLET | Refills: 3 | Status: SHIPPED | OUTPATIENT
Start: 2024-09-26

## 2024-12-27 RX ORDER — ESCITALOPRAM OXALATE 10 MG/1
10 TABLET ORAL DAILY
Qty: 90 TABLET | Refills: 3 | Status: SHIPPED | OUTPATIENT
Start: 2024-12-27

## 2025-01-12 RX ORDER — DICLOFENAC SODIUM 75 MG/1
TABLET, DELAYED RELEASE ORAL
Qty: 60 TABLET | Refills: 5 | OUTPATIENT
Start: 2025-01-12

## 2025-07-23 ENCOUNTER — OFFICE VISIT (OUTPATIENT)
Facility: CLINIC | Age: 75
End: 2025-07-23

## 2025-07-23 VITALS
HEART RATE: 59 BPM | TEMPERATURE: 98.3 F | HEIGHT: 68 IN | OXYGEN SATURATION: 97 % | DIASTOLIC BLOOD PRESSURE: 80 MMHG | RESPIRATION RATE: 16 BRPM | BODY MASS INDEX: 24.83 KG/M2 | SYSTOLIC BLOOD PRESSURE: 139 MMHG | WEIGHT: 163.8 LBS

## 2025-07-23 DIAGNOSIS — Z86.73 HISTORY OF ISCHEMIC LEFT MCA STROKE: ICD-10-CM

## 2025-07-23 DIAGNOSIS — I10 PRIMARY HYPERTENSION: ICD-10-CM

## 2025-07-23 DIAGNOSIS — R35.1 NOCTURIA: ICD-10-CM

## 2025-07-23 DIAGNOSIS — E78.5 DYSLIPIDEMIA: ICD-10-CM

## 2025-07-23 DIAGNOSIS — Z00.00 MEDICARE ANNUAL WELLNESS VISIT, SUBSEQUENT: Primary | ICD-10-CM

## 2025-07-23 DIAGNOSIS — Z79.891 OPIOID CONTRACT EXISTS: ICD-10-CM

## 2025-07-23 DIAGNOSIS — I69.320 APHASIA AS LATE EFFECT OF CEREBROVASCULAR ACCIDENT: ICD-10-CM

## 2025-07-23 DIAGNOSIS — I51.89 DIASTOLIC DYSFUNCTION: ICD-10-CM

## 2025-07-23 DIAGNOSIS — G89.29 OTHER CHRONIC PAIN: ICD-10-CM

## 2025-07-23 DIAGNOSIS — I35.1 NONRHEUMATIC AORTIC VALVE INSUFFICIENCY: ICD-10-CM

## 2025-07-23 RX ORDER — TRAMADOL HYDROCHLORIDE 50 MG/1
50 TABLET ORAL 2 TIMES DAILY PRN
Qty: 60 TABLET | Refills: 3 | Status: SHIPPED | OUTPATIENT
Start: 2025-07-23 | End: 2025-11-20

## 2025-07-23 RX ORDER — HYDRALAZINE HYDROCHLORIDE 25 MG/1
25 TABLET, FILM COATED ORAL 3 TIMES DAILY
COMMUNITY

## 2025-07-23 RX ORDER — LOSARTAN POTASSIUM 50 MG/1
100 TABLET ORAL DAILY
COMMUNITY
Start: 2025-05-29

## 2025-07-23 RX ORDER — CARVEDILOL 3.12 MG/1
3.12 TABLET ORAL 2 TIMES DAILY WITH MEALS
Qty: 180 TABLET | Refills: 3 | Status: SHIPPED | OUTPATIENT
Start: 2025-07-23

## 2025-07-23 RX ORDER — ASPIRIN 81 MG/1
81 TABLET ORAL DAILY
COMMUNITY
Start: 2025-05-17

## 2025-07-23 RX ORDER — ATORVASTATIN CALCIUM 40 MG/1
40 TABLET, FILM COATED ORAL DAILY
COMMUNITY
Start: 2025-05-17

## 2025-07-23 RX ORDER — SPIRONOLACTONE 25 MG/1
25 TABLET ORAL DAILY
COMMUNITY

## 2025-07-23 RX ORDER — GABAPENTIN 100 MG/1
100 CAPSULE ORAL 3 TIMES DAILY
COMMUNITY
End: 2025-07-23

## 2025-07-23 RX ORDER — CLOPIDOGREL BISULFATE 75 MG/1
75 TABLET ORAL DAILY
COMMUNITY

## 2025-07-23 SDOH — ECONOMIC STABILITY: FOOD INSECURITY: WITHIN THE PAST 12 MONTHS, YOU WORRIED THAT YOUR FOOD WOULD RUN OUT BEFORE YOU GOT MONEY TO BUY MORE.: NEVER TRUE

## 2025-07-23 SDOH — ECONOMIC STABILITY: FOOD INSECURITY: WITHIN THE PAST 12 MONTHS, THE FOOD YOU BOUGHT JUST DIDN'T LAST AND YOU DIDN'T HAVE MONEY TO GET MORE.: NEVER TRUE

## 2025-07-23 ASSESSMENT — PATIENT HEALTH QUESTIONNAIRE - PHQ9
6. FEELING BAD ABOUT YOURSELF - OR THAT YOU ARE A FAILURE OR HAVE LET YOURSELF OR YOUR FAMILY DOWN: SEVERAL DAYS
4. FEELING TIRED OR HAVING LITTLE ENERGY: SEVERAL DAYS
SUM OF ALL RESPONSES TO PHQ QUESTIONS 1-9: 7
SUM OF ALL RESPONSES TO PHQ QUESTIONS 1-9: 7
9. THOUGHTS THAT YOU WOULD BE BETTER OFF DEAD, OR OF HURTING YOURSELF: NOT AT ALL
10. IF YOU CHECKED OFF ANY PROBLEMS, HOW DIFFICULT HAVE THESE PROBLEMS MADE IT FOR YOU TO DO YOUR WORK, TAKE CARE OF THINGS AT HOME, OR GET ALONG WITH OTHER PEOPLE: SOMEWHAT DIFFICULT
SUM OF ALL RESPONSES TO PHQ QUESTIONS 1-9: 7
8. MOVING OR SPEAKING SO SLOWLY THAT OTHER PEOPLE COULD HAVE NOTICED. OR THE OPPOSITE, BEING SO FIGETY OR RESTLESS THAT YOU HAVE BEEN MOVING AROUND A LOT MORE THAN USUAL: NOT AT ALL
1. LITTLE INTEREST OR PLEASURE IN DOING THINGS: SEVERAL DAYS
5. POOR APPETITE OR OVEREATING: SEVERAL DAYS
3. TROUBLE FALLING OR STAYING ASLEEP: SEVERAL DAYS
2. FEELING DOWN, DEPRESSED OR HOPELESS: SEVERAL DAYS
SUM OF ALL RESPONSES TO PHQ QUESTIONS 1-9: 7
7. TROUBLE CONCENTRATING ON THINGS, SUCH AS READING THE NEWSPAPER OR WATCHING TELEVISION: SEVERAL DAYS

## 2025-07-23 ASSESSMENT — LIFESTYLE VARIABLES
HOW OFTEN DO YOU HAVE A DRINK CONTAINING ALCOHOL: NEVER
HOW MANY STANDARD DRINKS CONTAINING ALCOHOL DO YOU HAVE ON A TYPICAL DAY: PATIENT DOES NOT DRINK

## 2025-07-23 NOTE — PROGRESS NOTES
Medicare Annual Wellness Visit    Rohan Nash is here for Medicare AWV    Assessment & Plan   Medicare annual wellness visit, subsequent     No follow-ups on file.     Subjective       Patient's complete Health Risk Assessment and screening values have been reviewed and are found in Flowsheets. The following problems were reviewed today and where indicated follow up appointments were made and/or referrals ordered.    Positive Risk Factor Screenings with Interventions:      Depression:  PHQ-2 Score: 2  PHQ-9 Total Score: 7  Total Score Interpretation: 5-9 = mild depression  Interventions:  See A/P for any pertinent orders           Inactivity:  On average, how many days per week do you engage in moderate to strenuous exercise (like a brisk walk)?: 2 days (!) Abnormal  On average, how many minutes do you engage in exercise at this level?: 20 min  Interventions:  See A/P for plan and any pertinent orders      Dentist Screen:  Have you seen the dentist within the past year?: (!) No    Intervention:  See A/P for any pertinent orders                       Objective   Vitals:    07/23/25 1300 07/23/25 1303   BP: (!) 146/89 139/80   BP Site: Right Upper Arm Right Upper Arm   Patient Position: Sitting Sitting   BP Cuff Size: Medium Adult Large Adult   Pulse: 59    Resp: 16    Temp: 98.3 °F (36.8 °C)    TempSrc: Oral    SpO2: 97%    Weight: 74.3 kg (163 lb 12.8 oz)    Height: 1.727 m (5' 8\")       Body mass index is 24.91 kg/m².        General Appearance: alert and oriented to person, place and time, well developed and well- nourished, in no acute distress  Skin: warm and dry, no rash or erythema  Head: normocephalic and atraumatic  Eyes: pupils equal, round, and reactive to light, extraocular eye movements intact, conjunctivae normal  ENT: tympanic membrane, external ear and ear canal normal bilaterally, nose without deformity, nasal mucosa and turbinates normal without polyps  Neck: supple and non-tender without mass, no

## 2025-07-23 NOTE — PROGRESS NOTES
Chief Complaint   Patient presents with    Medicare AWV     Have you been to the ER, urgent care clinic since your last visit?  Hospitalized since your last visit?   YES - When: approximately 2 months ago.  Where and Why[facility].    Have you seen or consulted any other health care providers outside our system since your last visit?   NO      Have you had a colorectal cancer screening such as a colonoscopy/FIT/Cologuard?    NO    Date of last Colonoscopy: 4/3/2015  No cologuard on file  No FIT/FOBT on file   No flexible sigmoidoscopy on file        '

## 2025-07-23 NOTE — PATIENT INSTRUCTIONS
[employer] [facility]  Phone: [phone] Website: [employer]/vamedicaid  [facility]  Phone: [phone] Website: [employer]  Phone: [phone] Website: [employer]  [employer]  Phone: [phone] Website: [facility] Medicare Advantage Plans    Some plans may provide transportation. Members should contact the Member Services or Transportation number on the back of their insurance card for more information or to schedule transportation.      All stretcher and wheelchair transportation services are private pay  Stretcher Transportation  [facility]  Phone: [phone]  Website: https://[employer]/  [facility]  Phone: [phone]  Website: https://www.[employer].net/  [employer] [facility]  Phone: [phone]  Website: https://www.[employer]/    Wheelchair Van Transportation  [facility] - Advance Life Support, Basic Life Support, Critical Care Transport, Stretcher, Wheelchair, and Bariatric Transport.  Phone: [phone]  Website: https://www.[employer].net/  [facility] - Advance Life Support, Basic Life Support, Critical Care Transport, Stretcher, Wheelchair, Ambulatory, and Organ Procurement transport.  Phone: [phone]  Website: https://[employer]/  [employer] - Stretcher and wheelchair transportation. Home accommodations, and mobility solutions such as stair lifts, ramps and platform lifts.  Phone: [phone]  Website: http://www.[employer]/       Learning About Mindfulness for Stress  What are mindfulness and stress?     Stress is your body's response to a hard situation. Your body can have a physical, emotional, or mental response. A lot of things can cause stress. You may feel stress when you go on a job interview, take a test, or run a race. This kind of short-term stress is normal and even useful. It can help you if you need to

## 2025-07-23 NOTE — PROGRESS NOTES
SPORTS MEDICINE AND PRIMARY CARE  Elijah Newell MD, FACP, CMD  [address] [facility] [address]  Phone:  [phone]  Fax: [phone]       Chief Complaint   Patient presents with    Medicare AWV   .      SUBJECTIVE:       History of Present Illness  The patient is a 75-year-old male who returns today after a visit with Shaheed Ureña MD, for cardiomyopathies. He is accompanied by his wife.    He has a history of left MCA stroke that occurred in 05/2025. [facility] was admitted to [facility] and [facility]. 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 He reports memory loss, which he attributes to his stroke. He is currently undergoing outpatient speech therapy. He is scheduled to see his neurologist tomorrow.    He experiences pain on the right side of his body, extending from his foot to his shoulder. His wife is requesting a refill of gabapentin as he has run out of it. He takes tramadol only when he experiences pain. His wife notes that tramadol provides better relief than gabapentin. Initially, he was prescribed gabapentin 300 mg, which was later reduced to 100 mg. However, his wife is unsure if this medication has been effective.    He is currently on carvedilol 3.125 mg twice daily for blood pressure management. His wife is requesting a refill of this medication.    He is on atorvastatin once daily for cholesterol management.    He is on aspirin and clopidogrel once daily to prevent stroke.    He is on escitalopram 10 mg for depression.    He is on latanoprost eyedrops for glaucoma.    Social History:  Marital Status:     The patient has never had a colonoscopy.    FAMILY HISTORY  The patient's father is in his 60s, alive and

## 2025-07-24 LAB
ALBUMIN SERPL-MCNC: 4.1 G/DL (ref 3.5–5)
ALBUMIN/GLOB SERPL: 1.2 (ref 1.1–2.2)
ALP SERPL-CCNC: 93 U/L (ref 45–117)
ALT SERPL-CCNC: 26 U/L (ref 12–78)
ANION GAP SERPL CALC-SCNC: 4 MMOL/L (ref 2–12)
APPEARANCE UR: CLEAR
AST SERPL-CCNC: 21 U/L (ref 15–37)
BACTERIA URNS QL MICRO: NEGATIVE /HPF
BASOPHILS # BLD: 0.04 K/UL (ref 0–0.1)
BASOPHILS NFR BLD: 0.6 % (ref 0–1)
BILIRUB SERPL-MCNC: 0.9 MG/DL (ref 0.2–1)
BILIRUB UR QL: NEGATIVE
BUN SERPL-MCNC: 11 MG/DL (ref 6–20)
BUN/CREAT SERPL: 9 (ref 12–20)
CALCIUM SERPL-MCNC: 9.6 MG/DL (ref 8.5–10.1)
CHLORIDE SERPL-SCNC: 107 MMOL/L (ref 97–108)
CHOLEST SERPL-MCNC: 136 MG/DL
CO2 SERPL-SCNC: 29 MMOL/L (ref 21–32)
COLOR UR: NORMAL
CREAT SERPL-MCNC: 1.24 MG/DL (ref 0.7–1.3)
DIFFERENTIAL METHOD BLD: NORMAL
EOSINOPHIL # BLD: 0.09 K/UL (ref 0–0.4)
EOSINOPHIL NFR BLD: 1.3 % (ref 0–7)
EPITH CASTS URNS QL MICRO: NORMAL /LPF
ERYTHROCYTE [DISTWIDTH] IN BLOOD BY AUTOMATED COUNT: 14.4 % (ref 11.5–14.5)
GLOBULIN SER CALC-MCNC: 3.3 G/DL (ref 2–4)
GLUCOSE SERPL-MCNC: 84 MG/DL (ref 65–100)
GLUCOSE UR STRIP.AUTO-MCNC: NEGATIVE MG/DL
HCT VFR BLD AUTO: 44.9 % (ref 36.6–50.3)
HDLC SERPL-MCNC: 63 MG/DL
HDLC SERPL: 2.2 (ref 0–5)
HGB BLD-MCNC: 14.5 G/DL (ref 12.1–17)
HGB UR QL STRIP: NEGATIVE
IMM GRANULOCYTES # BLD AUTO: 0.03 K/UL (ref 0–0.04)
IMM GRANULOCYTES NFR BLD AUTO: 0.4 % (ref 0–0.5)
KETONES UR QL STRIP.AUTO: NEGATIVE MG/DL
LDLC SERPL CALC-MCNC: 63.4 MG/DL (ref 0–100)
LEUKOCYTE ESTERASE UR QL STRIP.AUTO: NEGATIVE
LYMPHOCYTES # BLD: 1.23 K/UL (ref 0.8–3.5)
LYMPHOCYTES NFR BLD: 17.2 % (ref 12–49)
MCH RBC QN AUTO: 30.1 PG (ref 26–34)
MCHC RBC AUTO-ENTMCNC: 32.3 G/DL (ref 30–36.5)
MCV RBC AUTO: 93.2 FL (ref 80–99)
MONOCYTES # BLD: 0.68 K/UL (ref 0–1)
MONOCYTES NFR BLD: 9.5 % (ref 5–13)
NEUTS SEG # BLD: 5.09 K/UL (ref 1.8–8)
NEUTS SEG NFR BLD: 71 % (ref 32–75)
NITRITE UR QL STRIP.AUTO: NEGATIVE
NRBC # BLD: 0 K/UL (ref 0–0.01)
NRBC BLD-RTO: 0 PER 100 WBC
PH UR STRIP: 6.5 (ref 5–8)
PLATELET # BLD AUTO: 245 K/UL (ref 150–400)
PMV BLD AUTO: 11.1 FL (ref 8.9–12.9)
POTASSIUM SERPL-SCNC: 4.2 MMOL/L (ref 3.5–5.1)
PROT SERPL-MCNC: 7.4 G/DL (ref 6.4–8.2)
PROT UR STRIP-MCNC: NEGATIVE MG/DL
PSA SERPL-MCNC: 2.9 NG/ML (ref 0.01–4)
RBC # BLD AUTO: 4.82 M/UL (ref 4.1–5.7)
RBC #/AREA URNS HPF: NORMAL /HPF (ref 0–5)
SODIUM SERPL-SCNC: 140 MMOL/L (ref 136–145)
SP GR UR REFRACTOMETRY: 1.01 (ref 1–1.03)
TRIGL SERPL-MCNC: 48 MG/DL
UROBILINOGEN UR QL STRIP.AUTO: 1 EU/DL (ref 0.2–1)
VLDLC SERPL CALC-MCNC: 9.6 MG/DL
WBC # BLD AUTO: 7.2 K/UL (ref 4.1–11.1)
WBC URNS QL MICRO: NORMAL /HPF (ref 0–4)

## 2025-07-25 LAB
AMPHETAMINES UR QL SCN: NEGATIVE NG/ML
BARBITURATES UR QL SCN: NEGATIVE NG/ML
BENZODIAZ UR QL SCN: NEGATIVE NG/ML
BZE UR QL SCN: NEGATIVE NG/ML
CANNABINOIDS UR QL SCN: NEGATIVE NG/ML
CREAT UR-MCNC: 133.2 MG/DL (ref 20–300)
FENTANYL+NORFENTANYL UR QL SCN: NEGATIVE PG/ML
LABORATORY COMMENT REPORT: NORMAL
MEPERIDINE UR QL: NEGATIVE NG/ML
METHADONE UR QL SCN: NEGATIVE NG/ML
OPIATES UR QL SCN: NEGATIVE NG/ML
OXYCODONE+OXYMORPHONE UR QL SCN: NEGATIVE NG/ML
PCP UR QL: NEGATIVE NG/ML
PH UR: 6 (ref 4.5–8.9)
PROPOXYPH UR QL SCN: NEGATIVE NG/ML
SP GR UR: 1.02
TRAMADOL UR QL SCN: NEGATIVE NG/ML

## 2025-08-27 RX ORDER — HYDRALAZINE HYDROCHLORIDE 25 MG/1
25 TABLET, FILM COATED ORAL 3 TIMES DAILY
Qty: 90 TABLET | Refills: 0 | Status: SHIPPED | OUTPATIENT
Start: 2025-08-27